# Patient Record
Sex: MALE | Race: OTHER | NOT HISPANIC OR LATINO | URBAN - METROPOLITAN AREA
[De-identification: names, ages, dates, MRNs, and addresses within clinical notes are randomized per-mention and may not be internally consistent; named-entity substitution may affect disease eponyms.]

---

## 2020-04-13 ENCOUNTER — INPATIENT (INPATIENT)
Facility: HOSPITAL | Age: 50
LOS: 8 days | Discharge: HOME | End: 2020-04-22
Attending: PSYCHIATRY & NEUROLOGY | Admitting: PSYCHIATRY & NEUROLOGY
Payer: COMMERCIAL

## 2020-04-13 ENCOUNTER — TRANSCRIPTION ENCOUNTER (OUTPATIENT)
Age: 50
End: 2020-04-13

## 2020-04-13 VITALS
HEART RATE: 88 BPM | RESPIRATION RATE: 18 BRPM | TEMPERATURE: 98 F | SYSTOLIC BLOOD PRESSURE: 235 MMHG | DIASTOLIC BLOOD PRESSURE: 128 MMHG | OXYGEN SATURATION: 96 %

## 2020-04-13 LAB
ALBUMIN SERPL ELPH-MCNC: 3.9 G/DL — SIGNIFICANT CHANGE UP (ref 3.5–5.2)
ALP SERPL-CCNC: 80 U/L — SIGNIFICANT CHANGE UP (ref 30–115)
ALT FLD-CCNC: 19 U/L — SIGNIFICANT CHANGE UP (ref 0–41)
ANION GAP SERPL CALC-SCNC: 14 MMOL/L — SIGNIFICANT CHANGE UP (ref 7–14)
APTT BLD: 31.4 SEC — SIGNIFICANT CHANGE UP (ref 27–39.2)
AST SERPL-CCNC: 20 U/L — SIGNIFICANT CHANGE UP (ref 0–41)
BASOPHILS # BLD AUTO: 0.07 K/UL — SIGNIFICANT CHANGE UP (ref 0–0.2)
BASOPHILS NFR BLD AUTO: 0.7 % — SIGNIFICANT CHANGE UP (ref 0–1)
BILIRUB SERPL-MCNC: 0.4 MG/DL — SIGNIFICANT CHANGE UP (ref 0.2–1.2)
BUN SERPL-MCNC: 45 MG/DL — HIGH (ref 10–20)
CALCIUM SERPL-MCNC: 9 MG/DL — SIGNIFICANT CHANGE UP (ref 8.5–10.1)
CHLORIDE SERPL-SCNC: 106 MMOL/L — SIGNIFICANT CHANGE UP (ref 98–110)
CO2 SERPL-SCNC: 20 MMOL/L — SIGNIFICANT CHANGE UP (ref 17–32)
CREAT SERPL-MCNC: 4.2 MG/DL — CRITICAL HIGH (ref 0.7–1.5)
EOSINOPHIL # BLD AUTO: 0.18 K/UL — SIGNIFICANT CHANGE UP (ref 0–0.7)
EOSINOPHIL NFR BLD AUTO: 1.8 % — SIGNIFICANT CHANGE UP (ref 0–8)
ESTIMATED AVERAGE GLUCOSE: 217 MG/DL — HIGH (ref 68–114)
GLUCOSE SERPL-MCNC: 165 MG/DL — HIGH (ref 70–99)
HBA1C BLD-MCNC: 9.2 % — HIGH (ref 4–5.6)
HCT VFR BLD CALC: 49.4 % — SIGNIFICANT CHANGE UP (ref 42–52)
HGB BLD-MCNC: 17.3 G/DL — SIGNIFICANT CHANGE UP (ref 14–18)
IMM GRANULOCYTES NFR BLD AUTO: 0.5 % — HIGH (ref 0.1–0.3)
INR BLD: 0.9 RATIO — SIGNIFICANT CHANGE UP (ref 0.65–1.3)
LYMPHOCYTES # BLD AUTO: 2 K/UL — SIGNIFICANT CHANGE UP (ref 1.2–3.4)
LYMPHOCYTES # BLD AUTO: 20.1 % — LOW (ref 20.5–51.1)
MCHC RBC-ENTMCNC: 29.1 PG — SIGNIFICANT CHANGE UP (ref 27–31)
MCHC RBC-ENTMCNC: 35 G/DL — SIGNIFICANT CHANGE UP (ref 32–37)
MCV RBC AUTO: 83 FL — SIGNIFICANT CHANGE UP (ref 80–94)
MONOCYTES # BLD AUTO: 0.6 K/UL — SIGNIFICANT CHANGE UP (ref 0.1–0.6)
MONOCYTES NFR BLD AUTO: 6 % — SIGNIFICANT CHANGE UP (ref 1.7–9.3)
NEUTROPHILS # BLD AUTO: 7.04 K/UL — HIGH (ref 1.4–6.5)
NEUTROPHILS NFR BLD AUTO: 70.9 % — SIGNIFICANT CHANGE UP (ref 42.2–75.2)
NRBC # BLD: 0 /100 WBCS — SIGNIFICANT CHANGE UP (ref 0–0)
PLATELET # BLD AUTO: 258 K/UL — SIGNIFICANT CHANGE UP (ref 130–400)
POTASSIUM SERPL-MCNC: 4.8 MMOL/L — SIGNIFICANT CHANGE UP (ref 3.5–5)
POTASSIUM SERPL-SCNC: 4.8 MMOL/L — SIGNIFICANT CHANGE UP (ref 3.5–5)
PROT SERPL-MCNC: 6.7 G/DL — SIGNIFICANT CHANGE UP (ref 6–8)
PROTHROM AB SERPL-ACNC: 10.4 SEC — SIGNIFICANT CHANGE UP (ref 9.95–12.87)
RBC # BLD: 5.95 M/UL — SIGNIFICANT CHANGE UP (ref 4.7–6.1)
RBC # FLD: 12.7 % — SIGNIFICANT CHANGE UP (ref 11.5–14.5)
SODIUM SERPL-SCNC: 140 MMOL/L — SIGNIFICANT CHANGE UP (ref 135–146)
TROPONIN T SERPL-MCNC: 0.06 NG/ML — CRITICAL HIGH
TROPONIN T SERPL-MCNC: 0.07 NG/ML — CRITICAL HIGH
WBC # BLD: 9.94 K/UL — SIGNIFICANT CHANGE UP (ref 4.8–10.8)
WBC # FLD AUTO: 9.94 K/UL — SIGNIFICANT CHANGE UP (ref 4.8–10.8)

## 2020-04-13 PROCEDURE — 93010 ELECTROCARDIOGRAM REPORT: CPT | Mod: 77

## 2020-04-13 PROCEDURE — 70450 CT HEAD/BRAIN W/O DYE: CPT | Mod: 26,77

## 2020-04-13 PROCEDURE — 93010 ELECTROCARDIOGRAM REPORT: CPT

## 2020-04-13 PROCEDURE — 99291 CRITICAL CARE FIRST HOUR: CPT

## 2020-04-13 PROCEDURE — 0042T: CPT

## 2020-04-13 PROCEDURE — 99284 EMERGENCY DEPT VISIT MOD MDM: CPT

## 2020-04-13 PROCEDURE — 71045 X-RAY EXAM CHEST 1 VIEW: CPT | Mod: 26

## 2020-04-13 RX ORDER — LABETALOL HCL 100 MG
30 TABLET ORAL ONCE
Refills: 0 | Status: COMPLETED | OUTPATIENT
Start: 2020-04-13 | End: 2020-04-13

## 2020-04-13 RX ORDER — ASPIRIN/CALCIUM CARB/MAGNESIUM 324 MG
81 TABLET ORAL DAILY
Refills: 0 | Status: DISCONTINUED | OUTPATIENT
Start: 2020-04-13 | End: 2020-04-13

## 2020-04-13 RX ORDER — HEPARIN SODIUM 5000 [USP'U]/ML
5000 INJECTION INTRAVENOUS; SUBCUTANEOUS EVERY 12 HOURS
Refills: 0 | Status: DISCONTINUED | OUTPATIENT
Start: 2020-04-13 | End: 2020-04-14

## 2020-04-13 RX ORDER — CHLORHEXIDINE GLUCONATE 213 G/1000ML
1 SOLUTION TOPICAL
Refills: 0 | Status: DISCONTINUED | OUTPATIENT
Start: 2020-04-13 | End: 2020-04-22

## 2020-04-13 RX ORDER — ATORVASTATIN CALCIUM 80 MG/1
80 TABLET, FILM COATED ORAL AT BEDTIME
Refills: 0 | Status: DISCONTINUED | OUTPATIENT
Start: 2020-04-13 | End: 2020-04-22

## 2020-04-13 RX ORDER — HYDRALAZINE HCL 50 MG
10 TABLET ORAL ONCE
Refills: 0 | Status: COMPLETED | OUTPATIENT
Start: 2020-04-13 | End: 2020-04-13

## 2020-04-13 RX ORDER — ALTEPLASE 100 MG
81 KIT INTRAVENOUS ONCE
Refills: 0 | Status: DISCONTINUED | OUTPATIENT
Start: 2020-04-13 | End: 2020-04-13

## 2020-04-13 RX ORDER — ALTEPLASE 100 MG
9 KIT INTRAVENOUS ONCE
Refills: 0 | Status: DISCONTINUED | OUTPATIENT
Start: 2020-04-13 | End: 2020-04-13

## 2020-04-13 RX ORDER — NICARDIPINE HYDROCHLORIDE 30 MG/1
5 CAPSULE, EXTENDED RELEASE ORAL
Qty: 40 | Refills: 0 | Status: DISCONTINUED | OUTPATIENT
Start: 2020-04-13 | End: 2020-04-14

## 2020-04-13 RX ORDER — ASPIRIN/CALCIUM CARB/MAGNESIUM 324 MG
325 TABLET ORAL DAILY
Refills: 0 | Status: DISCONTINUED | OUTPATIENT
Start: 2020-04-13 | End: 2020-04-14

## 2020-04-13 RX ADMIN — HEPARIN SODIUM 5000 UNIT(S): 5000 INJECTION INTRAVENOUS; SUBCUTANEOUS at 19:27

## 2020-04-13 RX ADMIN — Medication 30 MILLIGRAM(S): at 11:51

## 2020-04-13 RX ADMIN — Medication 81 MILLIGRAM(S): at 14:55

## 2020-04-13 RX ADMIN — Medication 10 MILLIGRAM(S): at 16:33

## 2020-04-13 RX ADMIN — NICARDIPINE HYDROCHLORIDE 25 MG/HR: 30 CAPSULE, EXTENDED RELEASE ORAL at 10:57

## 2020-04-13 NOTE — PROGRESS NOTE ADULT - SUBJECTIVE AND OBJECTIVE BOX
Stroke Progress Note:    TARUN DEL TORO    1. Chief Complaint: Left sided weakness.     HPI:  Patient is a 50 yo man with hx of dm, aflutter on asa who presents with the following. On Saturday night at 9pm, patient was at home and wife noted him drooling with slurred speech. He did not seek medical attention at the time and went to sleep.  On Sunday, patient states that he had some trouble breathing and felt like he had the flu.  he was afebrile.  he stayed in bed and had some stomach discomfort.  He woke up this am with worsening of his slurred speech and left sided facial droop and mild weakness on the left.  NIHSS 4. Stroke code called but was outside the window for IV TPA.       2. Relevant PMH:   Prior ischemic stroke/TIA[ x], Afib [x ], CAD [ ], HTN [ ], DLD [ ], DM [x ], PVD [ ], Obesity [ ],   Sedentary lifestyle [ ], CHF [ ], RAE [ ], Cancer Hx [ ].    3. Social History: Smoking [ ], Drug Use [ ], Alcohol Use [ ], Other [ ]    4. Possible Location of Stroke:  Unknown at this time will have a better understanding post stroke workup.   5. Relevant Brain Tissue Imaging:  < from: CT Brain Stroke Protocol (04.13.20 @ 10:35) >  IMPRESSION:    1.  No acute hemorrhage    2.  No acute territorial infarct    3.  Bilateral relatively symmetric diminished density within the white matter tracts of the corona radiata extending to external capsules. The appearance suggests ischemic change, but does not appear acute    6. Relevant Cerebrovascular Imaging:    CT Perfusion w/ Maps w/ IV Cont:   EXAM:  CT PERFUSION W MAPS IC            IMPRESSION:    1.  Patent carotid bifurcations.    2.  No large vessel occlusion    3.  No evidence for ischemia using rapid perfusion maps and the Tmax > 6 sec and CBF < 30% mismatch volume criteria         7. Relevant blood tests:    pending  8. Relevant cardiac rhythm monitoring:  known aflutter  9. Relevant Cardiac Structure: (TTE/INDY +/-):[ ]No intracardiac thrombus/[ ] no vegetation/[ ]no [place]ia/EF:  pending  Home Medications:      MEDICATIONS  (STANDING):  aspirin  chewable 81 milliGRAM(s) Oral daily  hydrALAZINE Injectable 10 milliGRAM(s) IV Push once  niCARdipine Infusion 5 mG/Hr (25 mL/Hr) IV Continuous <Continuous>      10. PT/OT/Speech/Rehab/S&Sw/ Cognitive eval results and recommendations: pending    11. Exam:    Vital Signs Last 24 Hrs  T(C): 36.1 (13 Apr 2020 14:30), Max: 36.7 (13 Apr 2020 10:08)  T(F): 96.9 (13 Apr 2020 14:30), Max: 98.1 (13 Apr 2020 10:08)  HR: 86 (13 Apr 2020 14:30) (81 - 100)  BP: 196/94 (13 Apr 2020 14:30) (139/64 - 235/128)  BP(mean): --  RR: 18 (13 Apr 2020 14:30) (18 - 18)  SpO2: 97% (13 Apr 2020 14:30) (96% - 100%)    12.   NIH STROKE SCALE  Item	                                                        Score  1 a.	Level of Consciousness	               	0  1 b. LOC Questions	                                0  1 c.	LOC Commands	                               	0  2.	Best Gaze	                                        0  3.	Visual	                                                0  4.	Facial Palsy	                                       1  5 a.	Motor Arm - Left	                               1  5 b.	Motor Arm - Right	                        0  6 a.	Motor Leg - Left	                            1  6 b.	Motor Leg - Right	                                0  7.	Limb Ataxia	                                        0  8.	Sensory	                                                0  9.	Language	                                        0  10.	Dysarthria	                                       1  11.	Extinction and Inattention  	        0  ______________________________________  TOTAL	                                                        0    Total NIHSS on admission:    4      mRS:2  0 No symptoms at all  1 No significant disability despite symptoms; able to carry out all usual duties and activities without assistance  2 Slight disability; unable to carry out all previous activities, but able to look after own affairs  3 Moderate disability; requiring some help, but able to walk without assistance  4 Moderately severe disability; unable to walk without assistance and unable to attend to own bodily needs without assistance  5 Severe disability; bedridden, incontinent and requiring constant nursing care and attention  6 Dead

## 2020-04-13 NOTE — H&P ADULT - HISTORY OF PRESENT ILLNESS
Patient is a 50 yo man with hx of dm, aflutter on asa who presents with the following.  On Saturday night at 9pm, patient was at home and wife noted him drooling with slurred speech. he did not seek medical attention at the time and went to sleep.  on Sunday, patient states that he had some trouble breathing and felt like he had the flu.  he was afebrile.  he stayed in bed and had some stomach discomfort.  he woke up this am with worsening of his slurred speech and left sided facial droop and mild weakness on the left.  NIHSS 4 AND MRS 0 50 y/o man with pmhx of DMII not on medications and RAE not on CPAP presents from home for slurred speech and left sided facial droop.  He states that on Saturday night at 9pm, he was home with his wife who noted him drooling with slurred speech. He did not seek medical attention at the time and went to sleep.  On Sunday, patient states that he had SOB and felt tired with body aches "as if I had the flu". He was afebrile but had chills. He also had some abdominal discomfort without N/V. He denies chest pain, cough, HA or dysuria. He has a hx of hematuria (reason why he d/rodger DMII medications) and proteinuria.  In ED, NIHSS 4 AND MRS 0, CT head negative for intracranial bleed, no evidence of stroke. BP was elevated ( 235/128) and he was started on nicardipine drip but subsequently d/rodger per neurology request. Rest of VS wnl, patient on room air saturating 98% with RR 18/min  Labs consistent with elevated creatinine at 4 ( no baseline).

## 2020-04-13 NOTE — CONSULT NOTE ADULT - SUBJECTIVE AND OBJECTIVE BOX
NEPHROLOGY CONSULTATION NOTE    50 y/o man with pmhx of DMII not on medications and RAE not on CPAP presents from home for slurred speech and left sided facial droop.  He states that on Saturday night at 9pm, he was home with his wife who noted him drooling with slurred speech. He did not seek medical attention at the time and went to sleep.  On Sunday, patient states that he had SOB and felt tired with body aches "as if I had the flu". He was afebrile but had chills. He also had some abdominal discomfort without N/V. He denies chest pain, cough, HA or dysuria. He has a hx of hematuria (reason why he d/rodger DMII medications) and proteinuria.  In ED, NIHSS 4 AND MRS 0, CT head negative for intracranial bleed, no evidence of stroke. BP was elevated ( 235/128) and he was started on nicardipine drip but subsequently d/rodger per neurology request. Rest of VS wnl, patient on room air saturating 98% with RR 18/min  Labs consistent with elevated creatinine at 4 ( no baseline). (4/13/2020)  weakness improved.    PAST MEDICAL & SURGICAL HISTORY:  Proteinuria  RAE (obstructive sleep apnea)  DM (diabetes mellitus)    Allergies:  Allergy Status Unknown    Home Medications:    Hospital Medications:   MEDICATIONS  (STANDING):  aspirin  chewable 81 milliGRAM(s) Oral daily  aspirin enteric coated 325 milliGRAM(s) Oral daily  atorvastatin 80 milliGRAM(s) Oral at bedtime  chlorhexidine 4% Liquid 1 Application(s) Topical <User Schedule>  heparin  Injectable 5000 Unit(s) SubCutaneous every 12 hours  niCARdipine Infusion 5 mG/Hr (25 mL/Hr) IV Continuous <Continuous>      SOCIAL HISTORY:  Denies ETOH,Smoking,   FAMILY HISTORY:  FHx: cancer of prostate        REVIEW OF SYSTEMS:    All other review of systems is negative unless indicated above.    VITALS:  T(F): 96.8 (04-13-20 @ 16:30), Max: 98.1 (04-13-20 @ 10:08)  HR: 81 (04-13-20 @ 16:30)  BP: 179/86 (04-13-20 @ 16:30)  RR: 16 (04-13-20 @ 16:30)  SpO2: 98% (04-13-20 @ 16:30)      Weight (kg): 114 (04-13 @ 10:16)    I&O's Detail        PHYSICAL EXAM:  Constitutional: NAD  Respiratory: CTAB, no wheezes, rales or rhonchi  Cardiovascular: S1, S2, RRR  Gastrointestinal: BS+, soft, NT/ND  Extremities: No peripheral edema  Neurological: A/O x 3  : No hinds.     Vascular Access:    LABS:  04-13    140  |  106  |  45<H>  ----------------------------<  165<H>  4.8   |  20  |  4.2<HH>    Ca    9.0      13 Apr 2020 10:20    TPro  6.7  /  Alb  3.9  /  TBili  0.4  /  DBili      /  AST  20  /  ALT  19  /  AlkPhos  80  04-13    Creatinine Trend: 4.2 <--                        17.3   9.94  )-----------( 258      ( 13 Apr 2020 10:20 )             49.4     Troponin T, Serum: 0.07 ng/mL (04.13.20 @ 10:20)      Urine Studies:    RADIOLOGY & ADDITIONAL STUDIES:  < from: CT Head No Cont (04.13.20 @ 13:14) >  IMPRESSION:     In comparison with the prior noncontrast CT scan of the brain dated April 13, 2020 time 10:27 AM:    Stable examination.    MRI of the brain is suggested for further evaluation not contraindicated in this patient.    < end of copied text >    < from: Xray Chest 1 View AP/PA (04.13.20 @ 11:23) >  Impression:      No radiographic evidence of acute cardiopulmonary disease.    < end of copied text >    < from: CT Perfusion w/ Maps w/ IV Cont (04.13.20 @ 11:00) >  IMPRESSION:    1.  Patent carotid bifurcations.    2.  No large vessel occlusion    3.  No evidence for ischemia using rapid perfusion maps and the Tmax > 6 sec and CBF < 30% mismatch volume criteria.    < end of copied text >    < from: CT Brain Stroke Protocol (04.13.20 @ 10:35) >  IMPRESSION:    1.  No acute hemorrhage    2.  No acute territorial infarct    3.  Bilateral relatively symmetric diminished density within the white matter tracts of the corona radiata extending to external capsules. The appearance suggests ischemic change, but does not appear acute    < end of copied text >

## 2020-04-13 NOTE — ED PROVIDER NOTE - OBJECTIVE STATEMENT
49y M w/ PMH of atrial flutter and DM presents with dysphagia that started earlier today. Last seen normal 845am. Has also been having L sided weakness with L. facial droop since 2 days ago. No alleviating/worsening symptoms. Denies fever, chills, cough, nasal congestion, sore throat, CP, SOB, abd pain, n/v/d, or numbness/tingling.

## 2020-04-13 NOTE — CONSULT NOTE ADULT - ASSESSMENT
Assessment:    Stroke of unkown duration  elevated creatinine(New JORGE vs CKD)    PLAN:    CNS: avoid sedation, f/u neurology, not candidate for TPa or neurointerventional as / neuro    HEENT:  Oral care    PULMONARY:  HOB @ 45 degrees, keep sats 94%-97%             CARDIOVASCULAR: Check EKG, 2d echo, repeat trops. keep SBP >14, <220. stop nicardene drip.     GI: GI prophylaxis Protonix                                         Feeding: npo for now, speech and swallow    RENAL:  unkown baseline, check urine lytes, alb/cr, follow up nephro.    INFECTIOUS DISEASE:no abx for now    HEMATOLOGICAL:  DVT prophylaxis.    ENDOCRINE:  Follow up FS.  Insulin protocol if needed.    MUSCULOSKELETAL: Bed rest for now    CODE STATUS: FULL CODE    DISPOSITION: Patient require continued monitoring in MICU for q1 neurochecks. Assessment:    l side weakness/ facial droop/ Stroke no tpa  Flu like symptoms ro COVID  Renal failure    PLAN:    CNS:  f/u neurology, ASA, MRI head    HEENT:  Oral care    PULMONARY:  HOB @ 45 degrees, keep sats 94%-97%             CARDIOVASCULAR: Check EKG, 2d echo, repeat trops. keep SBP >14, <220.    GI: GI prophylaxis Protonix                                         Feeding: npo for now, speech and swallow    RENAL:  check urine lytes, alb/cr, follow up nephro. renal us    INFECTIOUS DISEASE: nasopharyngeal swab for COVID    HEMATOLOGICAL:  DVT prophylaxis.    ENDOCRINE:  Follow up FS.  Insulin protocol if needed.    MUSCULOSKELETAL: Bed rest for now    CODE STATUS: FULL CODE    DISPOSITION: Patient require continued monitoring in MICU for q1 neurochecks.

## 2020-04-13 NOTE — ED PROVIDER NOTE - NS ED ROS FT
Eyes:  No visual changes, eye pain or discharge.  ENMT:  No hearing changes, pain, no sore throat or runny nose, no difficulty swallowing  Cardiac:  No chest pain, SOB or edema. No chest pain with exertion.  Respiratory:  No cough or respiratory distress. No hemoptysis. No history of asthma or RAD.  GI:  No nausea, vomiting, diarrhea or abdominal pain.  :  No dysuria, frequency or burning.  MS:  No myalgia, muscle weakness, joint pain or back pain.  Neuro:  No headache. No LOC. + weakness  Skin:  No skin rash.   Endocrine: + history of diabetes.

## 2020-04-13 NOTE — CONSULT NOTE ADULT - SUBJECTIVE AND OBJECTIVE BOX
Neurology Consultation note    Name  TARUN DEL TORO    HPI:  PATIENT WAS SEEN VIA TELEHEALTH  Patient is a 48 yo man with hx of dm, aflutter on asa who presents with the following.  On Saturday night at 9pm, patient was at home and wife noted him drooling with slurred speech. he did not seek medical attention at the time and went to sleep.  on Sunday, patient states that he had some trouble breathing and felt like he had the flu.  he was afebrile.  he stayed in bed and had some stomach discomfort.  he woke up this am with worsening of his slurred speech and left sided facial droop and mild weakness on the left.  NIHSS 4 AND MRS 0    STROKE CODE CALLED AT 1015 AM  /120 ON ADM AND PT ON CARDENE NOW  \    Vital Signs Last 24 Hrs  T(C): 36.7 (13 Apr 2020 10:08), Max: 36.7 (13 Apr 2020 10:08)  T(F): 98.1 (13 Apr 2020 10:08), Max: 98.1 (13 Apr 2020 10:08)  HR: 92 (13 Apr 2020 11:15) (85 - 100)  BP: 143/73 (13 Apr 2020 11:15) (143/73 - 235/128)  BP(mean): --  RR: 18 (13 Apr 2020 11:15) (18 - 18)  SpO2: 97% (13 Apr 2020 11:15) (96% - 100%)    Neurological Exam:   Mental status: Awake, alert and oriented x3.  Recent and remote memory intact.  Naming, repetition and comprehension intact.  Attention/concentration intact. , no aphasia.  Fund of knowledge appropriate.    Cranial nerves: Pupils equally round and reactive to light, visual fields full, no nystagmus, extraocular muscles intact, V1 through V3 intact bilaterally and symmetric, L CENTRAL FACIAL DROOP, hearing intact to finger rub, palate elevation symmetric, tongue was midline. MILD DYSARTHRIA  Motor:  LUE AND LLE DRIFT, DOES NOT HIT BED    Sensation: Intact to light touch, proprioception, and pinprick.   Coordination: No dysmetria on finger-to-nose and heel-to-shin.  No dysdiadokinesia.      Medications  aspirin  chewable 81 milliGRAM(s) Oral daily  aspirin  chewable 81 milliGRAM(s) Oral daily  labetalol Injectable 30 milliGRAM(s) IV Push once  niCARdipine Infusion 5 mG/Hr IV Continuous <Continuous>      Lab  04-13    140  |  106  |  45<H>  ----------------------------<  165<H>  4.8   |  20  |  4.2<HH>    Ca    9.0      13 Apr 2020 10:20    TPro  6.7  /  Alb  3.9  /  TBili  0.4  /  DBili  x   /  AST  20  /  ALT  19  /  AlkPhos  80  04-13                          17.3   9.94  )-----------( 258      ( 13 Apr 2020 10:20 )             49.4     LIVER FUNCTIONS - ( 13 Apr 2020 10:20 )  Alb: 3.9 g/dL / Pro: 6.7 g/dL / ALK PHOS: 80 U/L / ALT: 19 U/L / AST: 20 U/L / GGT: x                   Radiology  CTH NO ACUTE CHANGE  CTA H/N: NO LVO  CTP: NO EVIDENCE OF ISCHEMIA      Assessment:  50 YO MAN WITH HX OF DM AND A FLUTTER ADM WITH L HP AND DYSARTHRIA. PATIENT WITH GENERALIZED WEAKNESS AND MALAISE PRECEDING THIS YESTERDAY.  LKW POSSIBLY SAT BUT DEFINITELY WOKE UP WORSE THIS AM. NIHSS 4. NOT TPA CANDIDATE AS HE IS OUT OF THE WINDOW. NOT NI CANDIDATE      Plan:  ADMIT TO ICU FOR NEURO CHECKS Q 4 AND FOR BP MANAGEMENT  PERMISSIVE HTN -180  GIVEN  MG . CONT 162 mg qd (note patient took 3 asa 81 mg last night)  START LIPITOR 80 MG QD  2D ECHO  CAN REPEAT CTH IN AM  MRI BRAIN IF COVID NEGATIVE  FURTHER RECS AS PER NEUROVASCULAR TEAM Neurology Consultation note    Name  TARUN DEL TORO    HPI:  PATIENT WAS SEEN VIA TELEHEALTH  Patient is a 48 yo man with hx of dm, aflutter on asa who presents with the following.  On Saturday night at 9pm, patient was at home and wife noted him drooling with slurred speech. he did not seek medical attention at the time and went to sleep.  on Sunday, patient states that he had some trouble breathing and felt like he had the flu.  he was afebrile.  he stayed in bed and had some stomach discomfort.  he woke up this am with worsening of his slurred speech and left sided facial droop and mild weakness on the left.  NIHSS 4 AND MRS 0    STROKE CODE CALLED AT 1015 AM  /120 ON ADM AND PT ON CARDENE NOW  \    Vital Signs Last 24 Hrs  T(C): 36.7 (13 Apr 2020 10:08), Max: 36.7 (13 Apr 2020 10:08)  T(F): 98.1 (13 Apr 2020 10:08), Max: 98.1 (13 Apr 2020 10:08)  HR: 92 (13 Apr 2020 11:15) (85 - 100)  BP: 143/73 (13 Apr 2020 11:15) (143/73 - 235/128)  BP(mean): --  RR: 18 (13 Apr 2020 11:15) (18 - 18)  SpO2: 97% (13 Apr 2020 11:15) (96% - 100%)    Neurological Exam:   Mental status: Awake, alert and oriented x3.  Recent and remote memory intact.  Naming, repetition and comprehension intact.  Attention/concentration intact. , no aphasia.  Fund of knowledge appropriate.    Cranial nerves: Pupils equally round and reactive to light, visual fields full, no nystagmus, extraocular muscles intact, V1 through V3 intact bilaterally and symmetric, L CENTRAL FACIAL DROOP, hearing intact to finger rub, palate elevation symmetric, tongue was midline. MILD DYSARTHRIA  Motor:  LUE AND LLE DRIFT, DOES NOT HIT BED    Sensation: Intact to light touch, proprioception, and pinprick.   Coordination: No dysmetria on finger-to-nose and heel-to-shin.  No dysdiadokinesia.      Medications  aspirin  chewable 81 milliGRAM(s) Oral daily  aspirin  chewable 81 milliGRAM(s) Oral daily  labetalol Injectable 30 milliGRAM(s) IV Push once  niCARdipine Infusion 5 mG/Hr IV Continuous <Continuous>      Lab  04-13    140  |  106  |  45<H>  ----------------------------<  165<H>  4.8   |  20  |  4.2<HH>    Ca    9.0      13 Apr 2020 10:20    TPro  6.7  /  Alb  3.9  /  TBili  0.4  /  DBili  x   /  AST  20  /  ALT  19  /  AlkPhos  80  04-13                          17.3   9.94  )-----------( 258      ( 13 Apr 2020 10:20 )             49.4     LIVER FUNCTIONS - ( 13 Apr 2020 10:20 )  Alb: 3.9 g/dL / Pro: 6.7 g/dL / ALK PHOS: 80 U/L / ALT: 19 U/L / AST: 20 U/L / GGT: x                   Radiology  CTH NO ACUTE CHANGE  CTA H/N: NO LVO  CTP: NO EVIDENCE OF ISCHEMIA      Assessment:  50 YO MAN WITH HX OF DM AND A FLUTTER ADM WITH L HP AND DYSARTHRIA. PATIENT WITH GENERALIZED WEAKNESS AND MALAISE PRECEDING THIS YESTERDAY.  LKW POSSIBLY SAT BUT DEFINITELY WOKE UP WORSE THIS AM. NIHSS 4. NOT TPA CANDIDATE AS HE IS OUT OF THE WINDOW. NOT NI CANDIDATE      Plan:  ADMIT TO ICU FOR NEURO CHECKS Q 4 AND FOR BP MANAGEMENT  PERMISSIVE HTN TO TARGET 180s systolic.  maintain sbp >140  GIVEN  MG . CONT 162 mg qd (note patient took 3 asa 81 mg last night)  START LIPITOR 80 MG QD  2D ECHO  CAN REPEAT CTH IN AM  MRI BRAIN IF COVID NEGATIVE  FURTHER RECS AS PER NEUROVASCULAR TEAM

## 2020-04-13 NOTE — PROGRESS NOTE ADULT - ASSESSMENT
13. Impression:  Patient is a 50 yo man with hx of dm, aflutter on asa who presents with the following. On Saturday night at 9pm, patient was at home and wife noted him drooling with slurred speech. He did not seek medical attention at the time and went to sleep.  On Sunday, patient states that he had some trouble breathing and felt like he had the flu.  he was afebrile.  he stayed in bed and had some stomach discomfort.  He woke up this am with worsening of his slurred speech and left sided facial droop and mild weakness on the left.  NIHSS 4. Stroke code called but was outside the window for IV TPA. Patient started on Cardene for SBP >240. On the lowest dose of Cardene patients BP dropped to 170 systolic and his left sided weakness worsened, Stroke code called. CT head stable. Cardene stopped.     14. Probable cause/s of Stroke:  Etiology of stroke likely related to small vessel disease, but embolic source should be ruled out.     15. Suggestions:   Routine stroke workup including:  COVID19 TESTING.  Only restart Cardene if BP >220/110 and sustained.   MRI brain without carmelina.   TTE.  LDL, A1C  Telemetry monitoring.   PT OT Rehab   mg daily.   Lipitor 80 mg QHS  PT OT Rehab    16. Disposition: Should be tested for COVID 19, should have q4 neuro checks and telemetry monitoring.     Gregor Calvert NP  [phone]

## 2020-04-13 NOTE — CONSULT NOTE ADULT - ASSESSMENT
50 y/o M with JORGE in hospital for slurred speech, left side facial droop, SOB, chills but afebrile, body aches.  PMH DM, RAE, Proteinuria    # JORGE: no baseline creatinine available. last OP labs ~ 5 years ago. poor compliance with meds.   - ? etiology   - pt seems dry on exam.   - start LR at 75 cc/hr   - strict I/O   - check UA, urine creatinine, Na, Pr/Cr ratio   - trend creatinine   - BP noted, on nicardipine drip. monitor BP   - Hb noted.   - potassium WNL   - Ca at goal. check Phos, iPTH   - h/o DM for > 10 years. not on any medications. check HbA1C.   - slurred speech, facial drooping, weakness improved. unremarkable imaging noted. Neuro following. s/p contrast study.   - SOB. unremarkable CXR. pt unlikely to have COVID.   - avoid nephrotoxins and hyptension   - no need for RRT   - may need renal biopsy in near future.   - will follow

## 2020-04-13 NOTE — H&P ADULT - NSHPPHYSICALEXAM_GEN_ALL_CORE
GEN: No acute distress, NC/AT, anicteric  LUNGS: Clear to auscultation bilaterally, no wheezes  HEART: S1/S2 present. RRR.   ABD: Soft, non-tender, non-distended. Bowel sounds present  EXT: NC/NC/NE/2+PP/SALEH/Skin Intact.   NEURO: AAOX3    Intravenous access: Peripheral access  NG tube: None  Alvarado Catheter: None GEN: No acute distress, NC/AT, anicteric  LUNGS: Clear to auscultation bilaterally, no wheezes  HEART: S1/S2 present. RRR.   ABD: Soft, non-tender, non-distended. Bowel sounds present  EXT: NC/NC/NE/2+PP/SALEH/Skin Intact.   NEURO: AAOX3, moves all extremities, left facial droop, CN II-XII intact, no sensory deficits    Intravenous access: Peripheral access  NG tube: None  Alvarado Catheter: None

## 2020-04-13 NOTE — ED ADULT NURSE NOTE - OBJECTIVE STATEMENT
Pt c/o worsening left sided weakness and left sided facial droop since Saturday, pt also endorses worsening dysphagia that started approx 0845. No alleviating/worsening symptoms. Denies fever, chills, cough, nasal congestion, sore throat, CP, SOB, abd pain, n/v/d, or numbness/tingling.

## 2020-04-13 NOTE — ED PROVIDER NOTE - CARE PLAN
Principal Discharge DX:	Dysphagia  Secondary Diagnosis:	Weakness Principal Discharge DX:	Dysphagia  Assessment and plan of treatment:	r/oCVA  Secondary Diagnosis:	Weakness

## 2020-04-13 NOTE — H&P ADULT - ASSESSMENT
Assessment:    Stroke of unkown duration  elevated creatinine(New JORGE vs CKD)    PLAN:    CNS: avoid sedation, f/u neurology, not candidate for TPa or neurointerventional as / neuro    HEENT:  Oral care    PULMONARY:  HOB @ 45 degrees, keep sats 94%-97%             CARDIOVASCULAR: Check EKG, 2d echo, repeat trops. keep SBP >14, <220. stop nicardene drip.     GI: GI prophylaxis Protonix                                         Feeding: npo for now, speech and swallow    RENAL:  unknown baseline, check urine lytes, alb/cr, follow up nephro.    INFECTIOUS DISEASE: no abx for now    HEMATOLOGICAL:  DVT prophylaxis.    ENDOCRINE:  Follow up FS.  Insulin protocol if needed.    MUSCULOSKELETAL: Bed rest for now    CODE STATUS: FULL CODE    DISPOSITION: Patient require continued monitoring in MICU for q1 neurochecks. 50 y/o man with pmhx of DMII not on medications and RAE not on CPAP presents from home for slurred speech and left sided facial droop.    ASSESSMENT:    Stroke of unknown duration  elevated creatinine(New JORGE vs CKD)  RAE not on CPAP    PLAN:    CNS: avoid sedation, not candidate for tpA, can repeat CTH in am, MRI brain non contrast if COVID negative    HEENT:  Oral care    PULMONARY:  HOB @ 45 degrees, keep sats 94%-97%. Patient on room air saturatin 98%             CARDIOVASCULAR: EKG consistent with NSR with flattening of ST waves in the inferior leads, f/up 2d echo, trend repeat trops. keep SBP >14, <220. OFF cardene drip. Restart cardene drip if SBP >220 persistently    GI: GI prophylaxis Protonix 40 mg IV qd for now since patient NPO ( switch to PO when passes S&S)       ;    Feeding: npo for now,speech and swallow    RENAL:  unknown baseline, likely CKD ( patient has hx of proteinuria and hematuria), f/up urine lytes and UA, alb/cr, follow up nephro recs    INFECTIOUS DISEASE: no abx for now    HEMATOLOGICAL:  DVT prophylaxis with heparin subq q12    ENDOCRINE:  Follow up FS qac/hs.  Insulin protocol if needed.    MUSCULOSKELETAL: OOBTC as tolerated    CODE STATUS: FULL CODE

## 2020-04-13 NOTE — ED ADULT NURSE NOTE - CHIEF COMPLAINT QUOTE
Pt BIBA for worsening R face droop since Saturday.  Pt reporting worsening weakness and dizziness. Pt states he having diffulty speaking since this morning.

## 2020-04-13 NOTE — ED ADULT NURSE NOTE - NSIMPLEMENTINTERV_GEN_ALL_ED
Implemented All Universal Safety Interventions:  [place]owler to call system. Call bell, personal items and telephone within reach. Instruct patient to call for assistance. Room bathroom lighting operational. Non-slip footwear when patient is off stretcher. Physically safe environment: no spills, clutter or unnecessary equipment. Stretcher in lowest position, wheels locked, appropriate side rails in place.

## 2020-04-13 NOTE — ED PROVIDER NOTE - PHYSICAL EXAMINATION
CONSTITUTIONAL: Well-developed; well-nourished; in no acute distress.   SKIN: warm, dry  HEAD: Normocephalic; atraumatic.  EYES: PERRL, EOMI, no conjunctival erythema  ENT: No nasal discharge; airway clear.  NECK: Supple; non tender.  CARD: S1, S2 normal; no murmurs, gallops, or rubs. Regular rate and rhythm.   RESP: No wheezes, rales or rhonchi.  ABD: soft ntnd  EXT: Normal ROM.  No clubbing, cyanosis or edema. 4/5 strength in LUE + LLE.  LYMPH: No acute cervical adenopathy.  NEURO: Alert, oriented. L. facial droop. dysphagia. NIHSS = 4.   PSYCH: Cooperative, appropriate.

## 2020-04-13 NOTE — CONSULT NOTE ADULT - SUBJECTIVE AND OBJECTIVE BOX
Patient is a 49y old  Male who presents with a chief complaint of     HPI: 50 yo man with hx of dm, aflutter on asa who presents with the following.  On Saturday night at 9pm, patient was at home and wife noted him drooling with slurred speech. he did not seek medical attention at that time and went to sleep.  On Sunday, patient states that he had some trouble breathing and felt like he had the flu.  he was afebrile.  he stayed in bed and had some stomach discomfort.  he woke up this am with worsening of his slurred speech and left sided facial droop and mild weakness on the left.  NIHSS 4 AND MRS 0        PAST MEDICAL & SURGICAL HISTORY:  Aflutter not on AC  DM    SOCIAL HX:   Smoking     -ve                    ETOH       -ve                     Other  -ve    FAMILY HISTORY:  :  No known cardiovacular family hisotry     ROS:  See HPI     Allergies    Allergy Status Unknown    Intolerances          PHYSICAL EXAM    ICU Vital Signs Last 24 Hrs  T(C): 36.7 (13 Apr 2020 10:08), Max: 36.7 (13 Apr 2020 10:08)  T(F): 98.1 (13 Apr 2020 10:08), Max: 98.1 (13 Apr 2020 10:08)  HR: 81 (13 Apr 2020 12:42) (81 - 100)  BP: 139/64 (13 Apr 2020 12:42) (139/64 - 235/128)  RR: 18 (13 Apr 2020 12:42) (18 - 18)  SpO2: 97% on ra (13 Apr 2020 12:42) (96% - 100%)      General: In NAD   HEENT:  YUSUF              Lymphatic system: No cervical LN   Lungs: Bilateral BS  Cardiovascular: Regular  Gastrointestinal: Soft, Positive BS  Musculoskeletal: no LE edema  Skin: Warm.  Intact  Neurological: mild dysarthia, AAOx3, Left sided weakness, 3/5, L facial droop.        LABS:                          17.3   9.94  )-----------( 258      ( 13 Apr 2020 10:20 )             49.4                                               04-13    140  |  106  |  45<H>  ----------------------------<  165<H>  4.8   |  20  |  4.2<HH>    Ca    9.0      13 Apr 2020 10:20    TPro  6.7  /  Alb  3.9  /  TBili  0.4  /  DBili  x   /  AST  20  /  ALT  19  /  AlkPhos  80  04-13      PT/INR - ( 13 Apr 2020 10:20 )   PT: 10.40 sec;   INR: 0.90 ratio         PTT - ( 13 Apr 2020 10:20 )  PTT:31.4 sec                                           CARDIAC MARKERS ( 13 Apr 2020 10:20 )  x     / 0.07 ng/mL / x     / x     / x                                                LIVER FUNCTIONS - ( 13 Apr 2020 10:20 )  Alb: 3.9 g/dL / Pro: 6.7 g/dL / ALK PHOS: 80 U/L / ALT: 19 U/L / AST: 20 U/L / GGT: x                                                                                                                                       X-Rays                                                                                     ECHO    MEDICATIONS  (STANDING):  aspirin  chewable 81 milliGRAM(s) Oral daily  niCARdipine Infusion 5 mG/Hr (25 mL/Hr) IV Continuous <Continuous>    MEDICATIONS  (PRN): Patient is a 49y old  Male who presents with a chief complaint of slurred speech    HPI: 48 yo man with hx of dm, aflutter on asa who presents with slurred speech. On Saturday night at 9pm, patient was at home and wife noted him drooling with slurred speech. he did not seek medical attention at that time and went to sleep.  On Sunday, patient states that he had some trouble breathing and felt like he had the flu.  he was afebrile.  he stayed in bed and had some stomach discomfort.  he woke up this am with worsening of his slurred speech and left sided facial droop and mild weakness on the left.  NIHSS 4 AND MRS 0, stroke code called out of window for tpa, called for MICU for neuro check, no fever        PAST MEDICAL & SURGICAL HISTORY:  Aflutter not on AC  DM    SOCIAL HX:   Smoking     -ve                    ETOH       -ve                     Other  -ve    FAMILY HISTORY:  :  No known cardiovacular family hisotry     ROS:  See HPI     Allergies    Allergy Status Unknown    Intolerances          PHYSICAL EXAM    ICU Vital Signs Last 24 Hrs  T(C): 36.7 (13 Apr 2020 10:08), Max: 36.7 (13 Apr 2020 10:08)  T(F): 98.1 (13 Apr 2020 10:08), Max: 98.1 (13 Apr 2020 10:08)  HR: 81 (13 Apr 2020 12:42) (81 - 100)  BP: 139/64 (13 Apr 2020 12:42) (139/64 - 235/128)  RR: 18 (13 Apr 2020 12:42) (18 - 18)  SpO2: 97% on ra (13 Apr 2020 12:42) (96% - 100%)      General: In NAD   HEENT:  YUSUF              Lymphatic system: No cervical LN   Lungs: Bilateral BS  Cardiovascular: Regular  Gastrointestinal: Soft, Positive BS  Musculoskeletal: no LE edema  Skin: Warm.  Intact  Neurological: mild dysarthia, AAOx3, Left sided weakness, 3/5, L facial droop.        LABS:                          17.3   9.94  )-----------( 258      ( 13 Apr 2020 10:20 )             49.4                                               04-13    140  |  106  |  45<H>  ----------------------------<  165<H>  4.8   |  20  |  4.2<HH>    Ca    9.0      13 Apr 2020 10:20    TPro  6.7  /  Alb  3.9  /  TBili  0.4  /  DBili  x   /  AST  20  /  ALT  19  /  AlkPhos  80  04-13      PT/INR - ( 13 Apr 2020 10:20 )   PT: 10.40 sec;   INR: 0.90 ratio         PTT - ( 13 Apr 2020 10:20 )  PTT:31.4 sec                                           CARDIAC MARKERS ( 13 Apr 2020 10:20 )  x     / 0.07 ng/mL / x     / x     / x                                                LIVER FUNCTIONS - ( 13 Apr 2020 10:20 )  Alb: 3.9 g/dL / Pro: 6.7 g/dL / ALK PHOS: 80 U/L / ALT: 19 U/L / AST: 20 U/L / GGT: x                                                                                                                                       X-Rays / ct reviewed    MEDICATIONS  (STANDING):  aspirin  chewable 81 milliGRAM(s) Oral daily  niCARdipine Infusion 5 mG/Hr (25 mL/Hr) IV Continuous <Continuous>    MEDICATIONS  (PRN):

## 2020-04-13 NOTE — ED PROVIDER NOTE - PROGRESS NOTE DETAILS
Discussed with neurology. Recommends BP control and tPA once SBP < 185. ICU paged. Awaiting call back. Discussed with ICU fellow. Will take care of pt in ICU. Discussed with neurology. Recommends BP control and tPA once SBP < 185.  tpa not to be given after d/w neuro given ? onset of sx last pm

## 2020-04-13 NOTE — ED PROVIDER NOTE - ATTENDING CONTRIBUTION TO CARE
49 M to ED with CVA sx, 49 M to ED with CVA sx, from home  sx started last pm and this AM worsening around 8:45 am  no pain, no sick contacts, he is aware and has insight into diagnosis  Sx worsening so to ED and CODE stroke activated on arrival     AVSS, exam as noted, CTAB, RRR, abdomen soft NTND, (+) bowel sounds,

## 2020-04-13 NOTE — ED PROVIDER NOTE - CLINICAL SUMMARY MEDICAL DECISION MAKING FREE TEXT BOX
d/w ICU team,  d/wDr. aaron -neuro  d/w Dr.rayden macedo  will admit to ICU,no  tpa given ? onset time

## 2020-04-13 NOTE — H&P ADULT - NSHPLABSRESULTS_GEN_ALL_CORE
17.3   9.94  )-----------( 258      ( 13 Apr 2020 10:20 )             49.4       04-13    140  |  106  |  45<H>  ----------------------------<  165<H>  4.8   |  20  |  4.2<HH>    Ca    9.0      13 Apr 2020 10:20    TPro  6.7  /  Alb  3.9  /  TBili  0.4  /  DBili  x   /  AST  20  /  ALT  19  /  AlkPhos  80  04-13                  PT/INR - ( 13 Apr 2020 10:20 )   PT: 10.40 sec;   INR: 0.90 ratio         PTT - ( 13 Apr 2020 10:20 )  PTT:31.4 sec    Lactate Trend      CARDIAC MARKERS ( 13 Apr 2020 10:20 )  x     / 0.07 ng/mL / x     / x     / x            CAPILLARY BLOOD GLUCOSE      POCT Blood Glucose.: 148 mg/dL (13 Apr 2020 10:16)

## 2020-04-14 LAB
ANION GAP SERPL CALC-SCNC: 12 MMOL/L — SIGNIFICANT CHANGE UP (ref 7–14)
ANION GAP SERPL CALC-SCNC: 15 MMOL/L — HIGH (ref 7–14)
APPEARANCE UR: CLEAR — SIGNIFICANT CHANGE UP
APTT BLD: 28 SEC — SIGNIFICANT CHANGE UP (ref 27–39.2)
BASOPHILS # BLD AUTO: 0.05 K/UL — SIGNIFICANT CHANGE UP (ref 0–0.2)
BASOPHILS NFR BLD AUTO: 0.5 % — SIGNIFICANT CHANGE UP (ref 0–1)
BILIRUB UR-MCNC: NEGATIVE — SIGNIFICANT CHANGE UP
BUN SERPL-MCNC: 41 MG/DL — HIGH (ref 10–20)
BUN SERPL-MCNC: 43 MG/DL — HIGH (ref 10–20)
CALCIUM SERPL-MCNC: 7.8 MG/DL — LOW (ref 8.5–10.1)
CALCIUM SERPL-MCNC: 8.7 MG/DL — SIGNIFICANT CHANGE UP (ref 8.5–10.1)
CHLORIDE SERPL-SCNC: 106 MMOL/L — SIGNIFICANT CHANGE UP (ref 98–110)
CHLORIDE SERPL-SCNC: 112 MMOL/L — HIGH (ref 98–110)
CHOLEST SERPL-MCNC: 266 MG/DL — HIGH (ref 100–200)
CK SERPL-CCNC: 362 U/L — HIGH (ref 0–225)
CO2 SERPL-SCNC: 19 MMOL/L — SIGNIFICANT CHANGE UP (ref 17–32)
CO2 SERPL-SCNC: 20 MMOL/L — SIGNIFICANT CHANGE UP (ref 17–32)
COLOR SPEC: YELLOW — SIGNIFICANT CHANGE UP
CREAT SERPL-MCNC: 4 MG/DL — HIGH (ref 0.7–1.5)
CREAT SERPL-MCNC: 4.4 MG/DL — CRITICAL HIGH (ref 0.7–1.5)
CRP SERPL-MCNC: 0.2 MG/DL — SIGNIFICANT CHANGE UP (ref 0–0.4)
DIFF PNL FLD: ABNORMAL
EOSINOPHIL # BLD AUTO: 0.14 K/UL — SIGNIFICANT CHANGE UP (ref 0–0.7)
EOSINOPHIL NFR BLD AUTO: 1.5 % — SIGNIFICANT CHANGE UP (ref 0–8)
GLUCOSE BLDC GLUCOMTR-MCNC: 101 MG/DL — HIGH (ref 70–99)
GLUCOSE BLDC GLUCOMTR-MCNC: 126 MG/DL — HIGH (ref 70–99)
GLUCOSE SERPL-MCNC: 114 MG/DL — HIGH (ref 70–99)
GLUCOSE SERPL-MCNC: 125 MG/DL — HIGH (ref 70–99)
GLUCOSE UR QL: ABNORMAL
HCT VFR BLD CALC: 45.9 % — SIGNIFICANT CHANGE UP (ref 42–52)
HDLC SERPL-MCNC: 35 MG/DL — LOW
HGB BLD-MCNC: 15.7 G/DL — SIGNIFICANT CHANGE UP (ref 14–18)
IMM GRANULOCYTES NFR BLD AUTO: 0.3 % — SIGNIFICANT CHANGE UP (ref 0.1–0.3)
INR BLD: 0.98 RATIO — SIGNIFICANT CHANGE UP (ref 0.65–1.3)
KETONES UR-MCNC: SIGNIFICANT CHANGE UP
LEUKOCYTE ESTERASE UR-ACNC: NEGATIVE — SIGNIFICANT CHANGE UP
LIPID PNL WITH DIRECT LDL SERPL: 199 MG/DL — HIGH (ref 4–129)
LYMPHOCYTES # BLD AUTO: 1.76 K/UL — SIGNIFICANT CHANGE UP (ref 1.2–3.4)
LYMPHOCYTES # BLD AUTO: 19.2 % — LOW (ref 20.5–51.1)
MCHC RBC-ENTMCNC: 29 PG — SIGNIFICANT CHANGE UP (ref 27–31)
MCHC RBC-ENTMCNC: 34.2 G/DL — SIGNIFICANT CHANGE UP (ref 32–37)
MCV RBC AUTO: 84.7 FL — SIGNIFICANT CHANGE UP (ref 80–94)
MONOCYTES # BLD AUTO: 0.72 K/UL — HIGH (ref 0.1–0.6)
MONOCYTES NFR BLD AUTO: 7.8 % — SIGNIFICANT CHANGE UP (ref 1.7–9.3)
NEUTROPHILS # BLD AUTO: 6.48 K/UL — SIGNIFICANT CHANGE UP (ref 1.4–6.5)
NEUTROPHILS NFR BLD AUTO: 70.7 % — SIGNIFICANT CHANGE UP (ref 42.2–75.2)
NITRITE UR-MCNC: NEGATIVE — SIGNIFICANT CHANGE UP
NRBC # BLD: 0 /100 WBCS — SIGNIFICANT CHANGE UP (ref 0–0)
PH UR: 6.5 — SIGNIFICANT CHANGE UP (ref 5–8)
PLATELET # BLD AUTO: 239 K/UL — SIGNIFICANT CHANGE UP (ref 130–400)
POTASSIUM SERPL-MCNC: 4.4 MMOL/L — SIGNIFICANT CHANGE UP (ref 3.5–5)
POTASSIUM SERPL-MCNC: 5.2 MMOL/L — HIGH (ref 3.5–5)
POTASSIUM SERPL-SCNC: 4.4 MMOL/L — SIGNIFICANT CHANGE UP (ref 3.5–5)
POTASSIUM SERPL-SCNC: 5.2 MMOL/L — HIGH (ref 3.5–5)
PROCALCITONIN SERPL-MCNC: 0.16 NG/ML — HIGH (ref 0.02–0.1)
PROT UR-MCNC: ABNORMAL
PROTHROM AB SERPL-ACNC: 11.3 SEC — SIGNIFICANT CHANGE UP (ref 9.95–12.87)
RBC # BLD: 5.42 M/UL — SIGNIFICANT CHANGE UP (ref 4.7–6.1)
RBC # FLD: 13.1 % — SIGNIFICANT CHANGE UP (ref 11.5–14.5)
SARS-COV-2 RNA SPEC QL NAA+PROBE: SIGNIFICANT CHANGE UP
SODIUM SERPL-SCNC: 141 MMOL/L — SIGNIFICANT CHANGE UP (ref 135–146)
SODIUM SERPL-SCNC: 143 MMOL/L — SIGNIFICANT CHANGE UP (ref 135–146)
SP GR SPEC: 1.04 — HIGH (ref 1.01–1.02)
TOTAL CHOLESTEROL/HDL RATIO MEASUREMENT: 7.6 RATIO — HIGH (ref 4–5.5)
TRIGL SERPL-MCNC: 200 MG/DL — HIGH (ref 10–149)
TSH SERPL-MCNC: 3.75 UIU/ML — SIGNIFICANT CHANGE UP (ref 0.27–4.2)
UROBILINOGEN FLD QL: SIGNIFICANT CHANGE UP
WBC # BLD: 9.18 K/UL — SIGNIFICANT CHANGE UP (ref 4.8–10.8)
WBC # FLD AUTO: 9.18 K/UL — SIGNIFICANT CHANGE UP (ref 4.8–10.8)

## 2020-04-14 RX ORDER — LABETALOL HCL 100 MG
200 TABLET ORAL EVERY 12 HOURS
Refills: 0 | Status: DISCONTINUED | OUTPATIENT
Start: 2020-04-14 | End: 2020-04-14

## 2020-04-14 RX ORDER — NIFEDIPINE 30 MG
30 TABLET, EXTENDED RELEASE 24 HR ORAL DAILY
Refills: 0 | Status: DISCONTINUED | OUTPATIENT
Start: 2020-04-14 | End: 2020-04-15

## 2020-04-14 RX ORDER — SODIUM CHLORIDE 9 MG/ML
1000 INJECTION INTRAMUSCULAR; INTRAVENOUS; SUBCUTANEOUS
Refills: 0 | Status: DISCONTINUED | OUTPATIENT
Start: 2020-04-14 | End: 2020-04-15

## 2020-04-14 RX ORDER — SODIUM CHLORIDE 9 MG/ML
1000 INJECTION, SOLUTION INTRAVENOUS
Refills: 0 | Status: DISCONTINUED | OUTPATIENT
Start: 2020-04-14 | End: 2020-04-14

## 2020-04-14 RX ORDER — PANTOPRAZOLE SODIUM 20 MG/1
40 TABLET, DELAYED RELEASE ORAL
Refills: 0 | Status: DISCONTINUED | OUTPATIENT
Start: 2020-04-14 | End: 2020-04-22

## 2020-04-14 RX ORDER — ASPIRIN/CALCIUM CARB/MAGNESIUM 324 MG
162 TABLET ORAL DAILY
Refills: 0 | Status: DISCONTINUED | OUTPATIENT
Start: 2020-04-14 | End: 2020-04-22

## 2020-04-14 RX ORDER — HEPARIN SODIUM 5000 [USP'U]/ML
5000 INJECTION INTRAVENOUS; SUBCUTANEOUS EVERY 8 HOURS
Refills: 0 | Status: DISCONTINUED | OUTPATIENT
Start: 2020-04-14 | End: 2020-04-22

## 2020-04-14 RX ORDER — LABETALOL HCL 100 MG
200 TABLET ORAL EVERY 12 HOURS
Refills: 0 | Status: DISCONTINUED | OUTPATIENT
Start: 2020-04-14 | End: 2020-04-17

## 2020-04-14 RX ADMIN — HEPARIN SODIUM 5000 UNIT(S): 5000 INJECTION INTRAVENOUS; SUBCUTANEOUS at 06:26

## 2020-04-14 RX ADMIN — SODIUM CHLORIDE 100 MILLILITER(S): 9 INJECTION INTRAMUSCULAR; INTRAVENOUS; SUBCUTANEOUS at 12:08

## 2020-04-14 RX ADMIN — ATORVASTATIN CALCIUM 80 MILLIGRAM(S): 80 TABLET, FILM COATED ORAL at 23:01

## 2020-04-14 RX ADMIN — HEPARIN SODIUM 5000 UNIT(S): 5000 INJECTION INTRAVENOUS; SUBCUTANEOUS at 14:22

## 2020-04-14 RX ADMIN — HEPARIN SODIUM 5000 UNIT(S): 5000 INJECTION INTRAVENOUS; SUBCUTANEOUS at 23:01

## 2020-04-14 RX ADMIN — Medication 162 MILLIGRAM(S): at 14:22

## 2020-04-14 RX ADMIN — Medication 200 MILLIGRAM(S): at 14:39

## 2020-04-14 NOTE — PROGRESS NOTE ADULT - SUBJECTIVE AND OBJECTIVE BOX
Neurocritical Care Progress Note:    1. Brief Presentation: He reports of improved left sided weakness, continues to have dysarthria and left facial. His BP still elevated, but titrating down gradually.     2. Today's Acute Problems: Left sided weakness, left facial droop, and + dysarthria    3. 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 Of note, he also reports of body aches "as if I had the flu," + chills, abdominal discomfort without N/V. He denies chest pain, cough, HA or dysuria. In ED, he was found to have /128, initial NIHSS 4. Initial CT head negative for intracranial bleed and no evidence of stroke. Patient was started on nicardipine drip but it was subsequently d/rodger per neurology request due to worsening left sided weakness. He was also found to have elevated creatinine at 4 (no baseline). He is pending COVID-19 rule out.    4-Yesterday's Plan:    5. Last 24 hour updates:    6. Medications:   aspirin enteric coated 162 milliGRAM(s) Oral daily  atorvastatin 80 milliGRAM(s) Oral at bedtime  chlorhexidine 4% Liquid 1 Application(s) Topical <User Schedule>  heparin  Injectable 5000 Unit(s) SubCutaneous every 8 hours  labetalol 200 milliGRAM(s) Oral every 12 hours  niCARdipine Infusion 5 mG/Hr IV Continuous <Continuous>  NIFEdipine XL 30 milliGRAM(s) Oral daily  pantoprazole    Tablet 40 milliGRAM(s) Oral before breakfast  sodium chloride 0.9%. 1000 milliLiter(s) IV Continuous <Continuous>      7. Ancillary Management:   Chest PT[ ]   Head of bed >35 [ ]   Out of bed to chair [ ]   PT/OT/SP Eval [ ]   Spirometry[ ]   DVT prophalaxis[ ]    8.Neuro:   Awake: Spontaneously[ ] Occasionally[ ] To Voice [ ] To painful stimuli [ ]   AIert [ ]. Following commands: 3 steps[ ], 2 steps[ ], 1 step [ ], None [ ]   Orientation: 0[ ], 1[ ], 2[ ], 3[ ]. Tracking objects with eyes: [ ]   Language:   Time off sedation for exam:   Pupils: Right   >   Left    >      Corneal:      Gag reflex:     EOMI:    NIH STROKE SCALE  Item	                                                        Score  1 a.	Level of Consciousness	               	0  1 b. LOC Questions	                                0  1 c.	LOC Commands	                               	0  2.	Best Gaze	                                        0  3.	Visual	                                                0  4.	Facial Palsy	                                        0  5 a.	Motor Arm - Left	                                0  5 b.	Motor Arm - Right	                        0  6 a.	Motor Leg - Left	                                0  6 b.	Motor Leg - Right	                                0  7.	Limb Ataxia	                                        0  8.	Sensory	                                                0  9.	Language	                                        0  10.	Dysarthria	                                        0  11.	Extinction and Inattention  	        0  ______________________________________  TOTAL	                                                        0      mRS:  0 No symptoms at all  1 No significant disability despite symptoms; able to carry out all usual duties and activities without assistance  2 Slight disability; unable to carry out all previous activities, but able to look after own affairs  3 Moderate disability; requiring some help, but able to walk without assistance  4 Moderately severe disability; unable to walk without assistance and unable to attend to own bodily needs without assistance  5 Severe disability; bedridden, incontinent and requiring constant nursing care and attention  6 Dead    ICHs:  Age >=80  GCS Score: 3-4 (2 pts)   GCS Score: 5-12 (1 pt)   ICH Volume: >30  (+) IVH  (+) Infratentorial    Ortiz&Saucedo:  Grade I = Asymptomatic, mild headache, slight nuchal rigidity  Grade II = Moderate to severe headache, nuchal rigidity, no neurological deficit other than cranial nerve palsy  Grade III = Drowiness, confusion, mild focal neurological deficit  Grade IV = Stupor, moderate to severe hemiparesis  Grade V = Coma, decerebrate posturing    m-Shannon:  Grade 0   (No Subarachnoid Hemorrhage (SAH)), No intraventricular hemorrhage (IVH), Incidence of symptomatic vasopasm 0%  Grade 1   (Focal or diffuse, thin SAH), No IVH, incidence of symptomatic vasospasm 24%  Grade 2   (Thin focal or diffuse SAH), IVH present, incidence of symptomatic vasospasm 33%  Grade 3   (Thick focal or diffuse SAH), No IVH, incidence of symptomatic vasospasm 33%  Grade 4   (Thick focal or diffuse SAH), IVH present, incidence of symptomatic vasosasm 40%    Last CTH:    Last CTA/MRA:    Last CTP:    Last MRI:    Last TCD:    Last EEG:    EVD: [ ] Kneeland: [ ]     ICP:     CPP:     Level(cm):     24hr(ml):     CSF:     W:     R:     C:     P:     G:     LA:    9. Cardiovascular:   Vital Signs Last 24 Hrs  T(C): 36 (14 Apr 2020 07:39), Max: 36 (14 Apr 2020 07:39)  T(F): 96.8 (14 Apr 2020 07:39), Max: 96.8 (14 Apr 2020 07:39)  HR: 82 (14 Apr 2020 16:00) (78 - 90)  BP: 163/90 (14 Apr 2020 16:00) (163/90 - 215/113)  BP(mean): --  RR: 18 (14 Apr 2020 16:00) (17 - 18)  SpO2: 98% (14 Apr 2020 16:00) (96% - 99%)     Last Echo:    Last EKG:    CVP   MAP/CPP/SBP target:   CO:      CI:       Enzymes/Trop:    10. Respiratory:   ABG:    VBG:    Chest Xray:        Peak Pressure/Buckley Pressure:    11.GI:    Prophalaxis:     Bowel mvt:     Abd distension:   LIVER FUNCTIONS - ( 13 Apr 2020 10:20 )  Alb: 3.9 g/dL / Pro: 6.7 g/dL / ALK PHOS: 80 U/L / ALT: 19 U/L / AST: 20 U/L / GGT: x             12.Renal/Fluids/Electrolytes:    04-14    143  |  112<H>  |  41<H>  ----------------------------<  125<H>  4.4   |  19  |  4.0<H>    Ca    7.8<L>      14 Apr 2020 16:00    TPro  6.7  /  Alb  3.9  /  TBili  0.4  /  DBili  x   /  AST  20  /  ALT  19  /  AlkPhos  80  04-13    I&O's Summary    14 Apr 2020 07:01  -  14 Apr 2020 19:35  --------------------------------------------------------  IN: 200 mL / OUT: 0 mL / NET: 200 mL      13.ID:   T(C): 36 (14 Apr 2020 07:39), Max: 36 (14 Apr 2020 07:39)  T(F): 96.8 (14 Apr 2020 07:39), Max: 96.8 (14 Apr 2020 07:39)    Lines: Central[] Date inserted: Peripheral[]    14. Hematology:                         15.7   9.18  )-----------( 239      ( 14 Apr 2020 06:00 )             45.9      04-14    143  |  112<H>  |  41<H>  ----------------------------<  125<H>  4.4   |  19  |  4.0<H>    Ca    7.8<L>      14 Apr 2020 16:00    TPro  6.7  /  Alb  3.9  /  TBili  0.4  /  DBili  x   /  AST  20  /  ALT  19  /  AlkPhos  80  04-13     PT/INR - ( 14 Apr 2020 12:35 )   PT: 11.30 sec;   INR: 0.98 ratio         PTT - ( 14 Apr 2020 12:35 )  PTT:28.0 sec    DVT Prophylaxis Lovenox[ ] Heparin[ ] Venodynes[ ] SCD's[ ]        1. Chief Complaint: Left sided weakness and slurred speech      HPI:  48 y/o man with pmhx of DMII not on medications and RAE not on CPAP presents from home for slurred speech and left sided facial droop.He states that on Saturday night at 9pm, he was home with his wife who noted him drooling with slurred speech. He did not seek medical attention at the time and went to sleep.  On Sunday, patient states that he had SOB and felt tired with body aches "as if I had the flu". He was afebrile but had chills. He also had some abdominal discomfort without N/V. He denies chest pain, cough, HA or dysuria. He has a hx of hematuria (reason why he d/rodger DMII medications) and proteinuria.In ED, NIHSS 4 AND MRS 0, CT head negative for intracranial bleed, no evidence of stroke. BP was elevated ( 235/128) and he was started on nicardipine drip but subsequently d/rodger per neurology request. Rest of VS wnl, patient on room air saturating 98% with RR 18/minLabs consistent with elevated creatinine at 4 ( no baseline).      2. Relevant PMH:   Prior ischemic stroke/TIA[ ], Afib [ ], CAD [ ], HTN [ ], DLD [ ], DM [x ], PVD [ ], Obesity [ ],   Sedentary lifestyle [ ], CHF [ ], RAE [x ], Cancer Hx [ ].    3. Social History: Smoking [ ], Drug Use [ ], Alcohol Use [ ], Other [ ] Denies      4. Possible Location of Stroke: Unknown at this time will have a better understanding post stroke workup.       5. Relevant Brain Tissue Imaging  < from: CT Brain Stroke Protocol (04.13.20 @ 10:35) >  FINDINGS:    The ventricles and sulci are appropriate for the patient's age.    Bilateral, relatively symmetric diminished density is demonstrated in the white matter of the corona radiata extending along the external capsule regions. The appearance suggests chronic change possibly due to remote ischemic insult. There is a separate area of diminished density within the anterior portion of the right subinsular region.    There isno evidence for intracranial hemorrhage, significant space occupying process or recent territorial infarction. Gray-white differentiation is maintained.    The bones and sinuses are essentially unremarkable. There is scant opacification the region ofthe right frontal recess. The right frontal sinus is hypoplastic.    IMPRESSION:    1.  No acute hemorrhage    2.  No acute territorial infarct    3.  Bilateral relatively symmetric diminished density within the white matter tracts of the corona radiata extending to external capsules. The appearance suggests ischemic change, but does not appear acute    :      CT Perfusion w/ Maps w/ IV Cont:   EXAM:  CT PERFUSION W MAPS IC            PROCEDURE DATE:  04/13/2020      INTERPRETATION:  Clinical History / Reason for exam: 49-year-old male. History of diabetes and atrial flutter. Dysphagia. Left-sided weakness and left facial droop    TECHNIQUE: Axial contiguous images obtained through the neck from an intravenous administration 100 cc of Optiray. Sagittal, coronal and three-dimensional reformations..    FINDINGS:    Aortic arch: There is a normal branching pattern. The origins of the great vessels are normal. The subclavian arteries are unremarkable.    Right carotid: The common carotid artery is normal. The bifurcation is normally patent. The cervical internal and external carotid arteries are normal.    Left carotid: The common carotid artery is unremarkable. The bifurcation is patent. The cervical internal and external carotid arteries are normal.    Anterior intracranial circulation:    Right ICA: The petrous, cavernous and supraclinoid portions of the ICA are normal. The ophthalmic artery is patent. There is a prominent posterior communicating supply to the posterior cerebral artery with hypoplastic P1. The M1, bifurcation and remaining branches of the middle cerebral artery are patent. The anterior cerebral arteryA1 segment is mildly hypoplastic.    Left ICA: The petrous, cavernous and supraclinoid portions of the ICA are patent. There may be mild narrowing of the para ophthalmic ICA. The ophthalmic artery and small posterior communicating arteries are patent. The middle cerebral artery, trifurcation and branches are patent. The anterior cerebral arteries patent.    The region of the anterior to indicating arteries normal.    Vertebral basilar vessels: The vertebral arteries are patent in the neck. The left vertebral artery is dominant. The right vertebral artery is small and terminates in a posterior inferior cerebellar artery. The left PICA is patent. The basilar artery, superior cerebellar and posterior cerebral arteries are patent.    The visualized brain parenchyma was evaluated on the routine head CT performed at same time.    Other findings: The submandibular and parotid glands are unremarkable. The thyroid gland is unremarkable.    The visualized upper aerodigestive tract is normal.    Therapid perfusion maps do not demonstrate evidence for brain ischemia using the Tmax > 6 sec and CBF < 30% mismatch volume criteria.    Images to the upper thorax demonstrates that the visualized pleural parenchyma and mediastinum are normal.    IMPRESSION:    1.  Patent carotid bifurcations.    2.  No large vessel occlusion    3.  No evidence for ischemia using rapid perfusion maps and the Tmax > 6 sec and CBF < 30% mismatch volume Tono STOCKTON M.D., ATTENDING RADIOLOGIST  This document has been electronically signed. Apr 13 2020 11:13AM            Repeat cth< from: CT Head No Cont (04.13.20 @ 13:14) >  COMPARISON:    Noncontrast CT scan of the brain dated April 13, 2020 time 10:27 AM    FINDINGS:    The study was performed noncontrasthowever contrast is noted as the study was performed following the perfusion examination.    Patchy foci of diminished attenuation in the basal ganglia/periventricular white matter and anterior limbs of the external capsules consistent with ischemic change, age indeterminant, unchanged from prior study.    The third, fourth, and lateral ventricles are normal in size and position.     There is no shift of the midline structures or evidence of acute intracranial hemorrhage or depressed skull fracture.     IMPRESSION:     In comparison with the prior noncontrast CT scan of the brain dated April 13, 2020 time 10:27 AM:    Stable examination.    MRI of the brain is suggested for further evaluation not contraindicated in this patient.    < end of copied text >           7. Relevant blood tests:      Hemoglobin A1C, Whole Blood: 9.2 %        8. Relevant cardiac rhythm monitoring: NSR    9. Relevant Cardiac Structure: (TTE/INDY +/-):pENDING  [ ]No intracardiac thrombus/[ ] no vegetation/[ ]no akynesia/EF:    Home Medications:        MEDICATIONS  (STANDING):  Aspirin enteric coated 325 milligram Oral daily  Atorvastatin 80 milligram Oral at bedtime  Chlorhexidine 4% Liquid 1 Application(s) Topical <User Schedule>  Heparin  Injectable 5000 Unit(s) Subcutaneous every 12 hours  Nicardipine Infusion 5 mG/Hr (25 mL/Hr) IV Continuous <Continuous>      10. PT/OT/Speech/Rehab/S&Sw/ Cognitive eval results and recommendations: Pending    11. Exam:  Patient awake alert speech clear  CN: Partial left facial droop  Power: RUE 5/5   RLE 5/5             LUE 4+/5   LLE 5-/5(left UE/LE mild drift)  Ataxia : no ataxia  Sensory : Intact  Gait: deferred  No neglect      Vital Signs Last 24 Hrs  T(C): 36.1 (13 Apr 2020 17:30), Max: 36.7 (13 Apr 2020 10:08)  T(F): 97 (13 Apr 2020 17:30), Max: 98.1 (13 Apr 2020 10:08)  HR: 86 (14 Apr 2020 03:00) (78 - 100)  BP: 212/104 (14 Apr 2020 03:00) (139/64 - 235/128)  BP(mean): --  RR: 18 (14 Apr 2020 03:00) (16 - 18)  SpO2: 98% (14 Apr 2020 03:00) (96% - 100%)    12.   NIH STROKE SCALE  Item	                                                        Score  1 a.	Level of Consciousness	               	0  1 b. LOC Questions	                                    0  1 c.	LOC Commands	                               	0  2.	Best Gaze	                                    0  3.	Visual	                                                0  4.	Facial Palsy	                                    2    5 a.	Motor Arm - Left	                        1  5 b.	Motor Arm - Right	                        0  6 a.	Motor Leg - Left	                         1        6 b.	Motor Leg - Right	                         0  7.	Limb Ataxia	                                     0  8.	Sensory	                                                 0  9.	Language	                                     0  10.	Dysarthria	                                     0  11.	Extinction and Inattention  	             0  ______________________________________  TOTAL	                                          0-->4                  Total NIHSS on admission:      NIHSS yesterday: 4      NIHSS today:  4    mRS:  0 No symptoms at all  1 No significant disability despite symptoms; able to carry out all usual duties and activities without assistance  2 Slight disability; unable to carry out all previous activities, but able to look after own affairs  3 Moderate disability; requiring some help, but able to walk without assistance  4 Moderately severe disability; unable to walk without assistance and unable to attend to own bodily needs without assistance  5 Severe disability; bedridden, incontinent and requiring constant nursing care and attention  6 Dead Neurocritical Care Progress Note:    1. Brief Presentation: He reports of improved left sided weakness, continues to have dysarthria and left facial. His BP still elevated, but titrating down gradually.     2. Today's Acute Problems: Left sided weakness, left facial droop, and + dysarthria    3. 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 Of note, he also reports of body aches "as if I had the flu," + chills, abdominal discomfort without N/V. He denies chest pain, cough, HA or dysuria. In ED, he was found to have /128, initial NIHSS 4. Initial CT head negative for intracranial bleed and no evidence of stroke. Patient was started on nicardipine drip but it was subsequently d/rodger per neurology request due to worsening left sided weakness and for sudden drop in SBP to ~170s. He was also found to have elevated creatinine at 4 (no baseline). He is pending COVID-19 rule out.    4-Yesterday's Plan:  - Telemetry monitoring  -Only restart Cardene if BP >220/110 and sustained.   -MRI brain without carmelina   -Echo with bubble study  -LDL  -Maintain strict glycemic control  -PT OT Rehab  - mg daily.   -Lipitor 80 mg QHS  -PT OT Rehab Speech eval  -Neuro checks q4h    5. Last 24 hour updates: Brain MRI is pending, patient off of cardene gtt.     6. Medications:   aspirin enteric coated 162 milliGRAM(s) Oral daily  atorvastatin 80 milliGRAM(s) Oral at bedtime  chlorhexidine 4% Liquid 1 Application(s) Topical <User Schedule>  heparin  Injectable 5000 Unit(s) SubCutaneous every 8 hours  labetalol 200 milliGRAM(s) Oral every 12 hours  niCARdipine Infusion 5 mG/Hr (25 mL/Hr) IV Continuous <Continuous>  NIFEdipine XL 30 milliGRAM(s) Oral daily  pantoprazole    Tablet 40 milliGRAM(s) Oral before breakfast  sodium chloride 0.9%. 1000 milliLiter(s) (100 mL/Hr) IV Continuous <Continuous>    7. Ancillary Management:   Chest PT[ ]   Head of bed >35 [ ]   Out of bed to chair [ ]   PT/OT/SP Eval [ ]   Spirometry[ ]   DVT prophalaxis[ ]    8.Neuro:   Mental status: Awake, alert and oriented x 4. Attention and concentration intact. Fund of knowledge appropriate.  Language: Following commands. Naming, repetition, fluency, and comprehension intact. + Dysarthria, no aphasia.  Cranial nerves: B/l pupils equally round and reactive to light to 3 mm, visual fields full, no nystagmus, extraocular muscles intact, V1 through V3 intact bilaterally and symmetric, left facial droop, hearing intact to finger rub, palate elevation symmetric, tongue was midline, sternocleidomastoid/shoulder shrug strength bilaterally 5/5.    Motor: Normal bulk and tone, strength 5/5 in bilateral upper and lower extremities.   strength 4/5 in left hand. No drift in all extremities.   Sensation: Intact to light touch, proprioception, and pinprick.  No neglect.   Coordination: No dysmetria on finger-to-nose and heel-to-shin.     NIH STROKE SCALE  Item	                                                        Score  1 a.	Level of Consciousness	               	0  1 b. LOC Questions	                                    0  1 c.	LOC Commands	                               	0  2.	Best Gaze	                                    0  3.	Visual	                                                0  4.	Facial Palsy	                                    2  5 a.	Motor Arm - Left	                        0  5 b.	Motor Arm - Right	                        0  6 a.	Motor Leg - Left	                        0  6 b.	Motor Leg - Right	                        0  7.	Limb Ataxia	                                    0  8.	Sensory	                                                0  9.	Language	                                    0  10.	Dysarthria	                                    1  11.	Extinction and Inattention  	            0  ________________________________________________________________________  TOTAL	                                                            4->3    mRS: baseline 0 ->1 No significant disability despite symptoms; able to carry out all usual duties and activities without assistance    Last CTH:  < from: CT Head No Cont (04.13.20 @ 13:14) >  IMPRESSION:   In comparison with the prior noncontrast CT scan of the brain dated April 13, 2020 time 10:27 AM:  Stable examination.  MRI of the brain is suggested for further evaluation not contraindicated in this patient.    < from: CT Brain Stroke Protocol (04.13.20 @ 10:35) >  IMPRESSION:  1.  No acute hemorrhage  2.  No acute territorial infarct  3.  Bilateral relatively symmetric diminished density within the white matter tracts of the corona radiata extending to external capsules. The appearance suggests ischemic change, but does not appear acute    Last CTA/CTP:  < from: CT Perfusion w/ Maps w/ IV Cont (04.13.20 @ 11:00) >  FINDINGS:  Aortic arch: There is a normal branching pattern. The origins of the great vessels are normal. The subclavian arteries are unremarkable.  Right carotid: The common carotid artery is normal. The bifurcation is normally patent. The cervical internal and external carotid arteries are normal.  Left carotid: The common carotid artery is unremarkable. The bifurcation is patent. The cervical internal and external carotid arteries are normal.    Anterior intracranial circulation:    Right ICA: The petrous, cavernous and supraclinoid portions of the ICA are normal. The ophthalmic artery is patent. There is a prominent posterior communicating supply to the posterior cerebral artery with hypoplastic P1. The M1, bifurcation and remaining branches of the middle cerebral artery are patent. The anterior cerebral arteryA1 segment is mildly hypoplastic.    Left ICA: The petrous, cavernous and supraclinoid portions of the ICA are patent. There may be mild narrowing of the para ophthalmic ICA. The ophthalmic artery and small posterior communicating arteries are patent. The middle cerebral artery, trifurcation and branches are patent. The anterior cerebral arteries patent.    The region of the anterior to indicating arteries normal.    Vertebral basilar vessels: The vertebral arteries are patent in the neck. The left vertebral artery is dominant. The right vertebral artery is small and terminates in a posterior inferior cerebellar artery. The left PICA is patent. The basilar artery, superior cerebellar and posterior cerebral arteries are patent.    The visualized brain parenchyma was evaluated on the routine head CT performed at same time.    Other findings: The submandibular and parotid glands are unremarkable. The thyroid gland is unremarkable.    The visualized upper aerodigestive tract is normal.    Therapid perfusion maps do not demonstrate evidence for brain ischemia using the Tmax > 6 sec and CBF < 30% mismatch volume criteria.    Images to the upper thorax demonstrates that the visualized pleural parenchyma and mediastinum are normal.    IMPRESSION:  1.  Patent carotid bifurcations.  2.  No large vessel occlusion  3.  No evidence for ischemia using rapid perfusion maps and the Tmax > 6 sec and CBF < 30% mismatch volume criteria.    Last MRI: PENDING    Last TCD: NONE    Last EEG: NONE    9. Cardiovascular:   HR: 82 (14 Apr 2020 16:00) (78 - 90)  BP: 163/90 (14 Apr 2020 16:00) (163/90 - 215/113)    Last Echo: PENDING    Last EKG:  < from: 12 Lead ECG (04.13.20 @ 16:59) >  Ventricular Rate 83 BPM  Atrial Rate 83 BPM  P-R Interval 174 ms  QRS Duration 90 ms  Q-T Interval 382 ms  QTC Calculation(Bezet) 448 ms  P Axis 40 degrees  R Axis 79 degrees  T Axis 27 degrees  Diagnosis Line Normal sinus rhythm  Normal ECG      10. Respiratory:   RR: 18 (14 Apr 2020 16:00) (17 - 18)  SpO2: 98% (14 Apr 2020 16:00) (96% - 99%)   < from: Xray Chest 1 View AP/PA (04.13.20 @ 11:23) >  Impression:    No radiographic evidence of acute cardiopulmonary disease.    11.GI:  Prophalaxis: Protonix 40 mg daily  Bowel mvt:     Abd distension: No  LIVER FUNCTIONS - ( 13 Apr 2020 10:20 )  Alb: 3.9 g/dL / Pro: 6.7 g/dL / ALK PHOS: 80 U/L / ALT: 19 U/L / AST: 20 U/L / GGT: x           12.Renal/Fluids/Electrolytes:    143  |  112<H>  |  41<H>  ----------------------------<  125<H>  4.4   |  19  |  4.0<H>    Ca    7.8<L>      14 Apr 2020 16:00    TPro  6.7  /  Alb  3.9  /  TBili  0.4  /  DBili  x   /  AST  20  /  ALT  19  /  AlkPhos  80  04-13    I&O's Summary    14 Apr 2020 07:01  -  14 Apr 2020 19:35  --------------------------------------------------------  IN: 200 mL / OUT: 0 mL / NET: 200 mL      13.ID:   T(C): 36 (14 Apr 2020 07:39), Max: 36 (14 Apr 2020 07:39)  T(F): 96.8 (14 Apr 2020 07:39), Max: 96.8 (14 Apr 2020 07:39)    Lines: Central[] Date inserted: Peripheral[X]    14. Hematology:                         15.7   9.18  )-----------( 239      ( 14 Apr 2020 06:00 )             45.9       PT/INR - ( 14 Apr 2020 12:35 )   PT: 11.30 sec;   INR: 0.98 ratio    PTT - ( 14 Apr 2020 12:35 )  PTT:28.0 sec    ENDO:  Hemoglobin A1C with Mean Plasma Glucose (04.13.20 @ 16:00)    Hemoglobin A1C, Whole Blood: 9.2: Method: Immunoassay    Lipid Profile in AM (04.14.20 @ 06:00)    Total Cholesterol/HDL Ratio Measurement: 7.6 Ratio    Cholesterol, Serum: 266 mg/dL    Triglycerides, Serum: 200 mg/dL    HDL Cholesterol, Serum: 35: HDL Levels >/= 60 mg/dL are considered beneficial and a "negative" risk  factor.  Effective 08/15/2018: New reference range and interpretive comment. mg/dL    Direct LDL: 199: LDL Cholesterol (mg/dL) --- Interpretive Comment (for adults 18 and over)      DVT Prophylaxis Lovenox[ ] Heparin[X ] Venodynes[ ] SCD's[ ] Neurocritical Care Progress Note:    1. Brief Presentation: He reports of improved left sided weakness, continues to have dysarthria and left facial. His BP still elevated, but titrating down gradually.     2. Today's Acute Problems: Left sided weakness, left facial droop, and + dysarthria    3. 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 Of note, he also reports of body aches "as if I had the flu," + chills, abdominal discomfort without N/V. He denies chest pain, cough, HA or dysuria. In ED, he was found to have /128, initial NIHSS 4. Initial CT head negative for intracranial bleed and no evidence of stroke. Patient was started on nicardipine drip but it was subsequently d/rodger per neurology request due to worsening left sided weakness and for sudden drop in SBP to ~170s. He was also found to have elevated creatinine at 4 (no baseline). He is pending COVID-19 rule out.    4-Yesterday's Plan:  - Telemetry monitoring  -Only restart Cardene if BP >220/110 and sustained.   -MRI brain without carmelina   -Echo with bubble study  -LDL  -Maintain strict glycemic control  -PT OT Rehab  - mg daily.   -Lipitor 80 mg QHS  -PT OT Rehab Speech eval  -Neuro checks q4h    5. Last 24 hour updates: Brain MRI is pending, patient off of cardene gtt.     6. Medications:   aspirin enteric coated 162 milliGRAM(s) Oral daily  atorvastatin 80 milliGRAM(s) Oral at bedtime  chlorhexidine 4% Liquid 1 Application(s) Topical <User Schedule>  heparin  Injectable 5000 Unit(s) SubCutaneous every 8 hours  labetalol 200 milliGRAM(s) Oral every 12 hours  niCARdipine Infusion 5 mG/Hr (25 mL/Hr) IV Continuous <Continuous>  NIFEdipine XL 30 milliGRAM(s) Oral daily  pantoprazole    Tablet 40 milliGRAM(s) Oral before breakfast  sodium chloride 0.9%. 1000 milliLiter(s) (100 mL/Hr) IV Continuous <Continuous>    7. Ancillary Management:   Chest PT[ ]   Head of bed >35 [ ]   Out of bed to chair [ ]   PT/OT/SP Eval [ ]   Spirometry[ ]   DVT prophalaxis[ ]    8.Neuro:   Mental status: Awake, alert and oriented x 4. Attention and concentration intact. Fund of knowledge appropriate.  Language: Following commands. Naming, repetition, fluency, and comprehension intact. + Dysarthria, no aphasia.  Cranial nerves: B/l pupils equally round and reactive to light to 3 mm, visual fields full, no nystagmus, extraocular muscles intact, V1 through V3 intact bilaterally and symmetric, left facial droop, hearing intact to finger rub, palate elevation symmetric, tongue was midline, sternocleidomastoid/shoulder shrug strength bilaterally 5/5.    Motor: Normal bulk and tone, strength 5/5 in bilateral upper and lower extremities.   strength 4/5 in left hand. No drift in all extremities.   Sensation: Intact to light touch, proprioception, and pinprick.  No neglect.   Coordination: No dysmetria on finger-to-nose and heel-to-shin.     NIH STROKE SCALE  Item	                                                        Score  1 a.	Level of Consciousness	               	0  1 b. LOC Questions	                                    0  1 c.	LOC Commands	                               	0  2.	Best Gaze	                                    0  3.	Visual	                                                0  4.	Facial Palsy	                                    2  5 a.	Motor Arm - Left	                        0  5 b.	Motor Arm - Right	                        0  6 a.	Motor Leg - Left	                        0  6 b.	Motor Leg - Right	                        0  7.	Limb Ataxia	                                    0  8.	Sensory	                                                0  9.	Language	                                    0  10.	Dysarthria	                                    1  11.	Extinction and Inattention  	            0  ________________________________________________________________________  TOTAL	                                                            4->3    mRS: baseline 0 ->1 No significant disability despite symptoms; able to carry out all usual duties and activities without assistance    Last CTH:  < from: CT Head No Cont (04.13.20 @ 13:14) >  IMPRESSION:   In comparison with the prior noncontrast CT scan of the brain dated April 13, 2020 time 10:27 AM:  Stable examination.  MRI of the brain is suggested for further evaluation not contraindicated in this patient.    < from: CT Brain Stroke Protocol (04.13.20 @ 10:35) >  IMPRESSION:  1.  No acute hemorrhage  2.  No acute territorial infarct  3.  Bilateral relatively symmetric diminished density within the white matter tracts of the corona radiata extending to external capsules. The appearance suggests ischemic change, but does not appear acute    Last CTA/CTP:  < from: CT Perfusion w/ Maps w/ IV Cont (04.13.20 @ 11:00) >  FINDINGS:  Aortic arch: There is a normal branching pattern. The origins of the great vessels are normal. The subclavian arteries are unremarkable.  Right carotid: The common carotid artery is normal. The bifurcation is normally patent. The cervical internal and external carotid arteries are normal.  Left carotid: The common carotid artery is unremarkable. The bifurcation is patent. The cervical internal and external carotid arteries are normal.    Anterior intracranial circulation:    Right ICA: The petrous, cavernous and supraclinoid portions of the ICA are normal. The ophthalmic artery is patent. There is a prominent posterior communicating supply to the posterior cerebral artery with hypoplastic P1. The M1, bifurcation and remaining branches of the middle cerebral artery are patent. The anterior cerebral arteryA1 segment is mildly hypoplastic.    Left ICA: The petrous, cavernous and supraclinoid portions of the ICA are patent. There may be mild narrowing of the para ophthalmic ICA. The ophthalmic artery and small posterior communicating arteries are patent. The middle cerebral artery, trifurcation and branches are patent. The anterior cerebral arteries patent.    The region of the anterior to indicating arteries normal.    Vertebral basilar vessels: The vertebral arteries are patent in the neck. The left vertebral artery is dominant. The right vertebral artery is small and terminates in a posterior inferior cerebellar artery. The left PICA is patent. The basilar artery, superior cerebellar and posterior cerebral arteries are patent.    The visualized brain parenchyma was evaluated on the routine head CT performed at same time.    Other findings: The submandibular and parotid glands are unremarkable. The thyroid gland is unremarkable.    The visualized upper aerodigestive tract is normal.    Therapid perfusion maps do not demonstrate evidence for brain ischemia using the Tmax > 6 sec and CBF < 30% mismatch volume criteria.    Images to the upper thorax demonstrates that the visualized pleural parenchyma and mediastinum are normal.    IMPRESSION:  1.  Patent carotid bifurcations.  2.  No large vessel occlusion  3.  No evidence for ischemia using rapid perfusion maps and the Tmax > 6 sec and CBF < 30% mismatch volume criteria.    Last MRI: PENDING    Last TCD: NONE    Last EEG: NONE    9. Cardiovascular:   HR: 82 (14 Apr 2020 16:00) (78 - 90)  BP: 163/90 (14 Apr 2020 16:00) (163/90 - 215/113)    Last Echo: PENDING    Last EKG:  < from: 12 Lead ECG (04.13.20 @ 16:59) >  Ventricular Rate 83 BPM  Atrial Rate 83 BPM  P-R Interval 174 ms  QRS Duration 90 ms  Q-T Interval 382 ms  QTC Calculation(Bezet) 448 ms  P Axis 40 degrees  R Axis 79 degrees  T Axis 27 degrees  Diagnosis Line Normal sinus rhythm  Normal ECG      10. Respiratory:   RR: 18 (14 Apr 2020 16:00) (17 - 18)  SpO2: 98% (14 Apr 2020 16:00) (96% - 99%)   < from: Xray Chest 1 View AP/PA (04.13.20 @ 11:23) >  Impression:    No radiographic evidence of acute cardiopulmonary disease.    11.GI:  Prophalaxis: Protonix 40 mg daily  Bowel mvt:     Abd distension: No  LIVER FUNCTIONS - ( 13 Apr 2020 10:20 )  Alb: 3.9 g/dL / Pro: 6.7 g/dL / ALK PHOS: 80 U/L / ALT: 19 U/L / AST: 20 U/L / GGT: x           12.Renal/Fluids/Electrolytes:    143  |  112<H>  |  41<H>  ----------------------------<  125<H>  4.4   |  19  |  4.0<H>    Ca    7.8<L>      14 Apr 2020 16:00    TPro  6.7  /  Alb  3.9  /  TBili  0.4  /  DBili  x   /  AST  20  /  ALT  19  /  AlkPhos  80  04-13    I&O's Summary    14 Apr 2020 07:01  -  14 Apr 2020 19:35  --------------------------------------------------------  IN: 200 mL / OUT: 0 mL / NET: 200 mL      13.ID:   T(C): 36 (14 Apr 2020 07:39), Max: 36 (14 Apr 2020 07:39)  T(F): 96.8 (14 Apr 2020 07:39), Max: 96.8 (14 Apr 2020 07:39)    Lines: Central[] Date inserted: Peripheral[X]    14. Hematology:                         15.7   9.18  )-----------( 239      ( 14 Apr 2020 06:00 )             45.9       PT/INR - ( 14 Apr 2020 12:35 )   PT: 11.30 sec;   INR: 0.98 ratio    PTT - ( 14 Apr 2020 12:35 )  PTT:28.0 sec    ENDO:  Hemoglobin A1C with Mean Plasma Glucose (04.13.20 @ 16:00)    Hemoglobin A1C, Whole Blood: 9.2: Method: Immunoassay    Lipid Profile in AM (04.14.20 @ 06:00)    Total Cholesterol/HDL Ratio Measurement: 7.6 Ratio    Cholesterol, Serum: 266 mg/dL    Triglycerides, Serum: 200 mg/dL    HDL Cholesterol, Serum: 35: HDL Levels >/= 60 mg/dL are considered beneficial and a "negative" risk  factor.  Effective 08/15/2018: New reference range and interpretive comment. mg/dL    Direct LDL: 199: LDL Cholesterol (mg/dL) --- Interpretive Comment (for adults 18 and over)      DVT Prophylaxis Lovenox[ ] Heparin[X ] Venodynes[ ] SCD's[ ]

## 2020-04-14 NOTE — PROGRESS NOTE ADULT - SUBJECTIVE AND OBJECTIVE BOX
Nephrology progress note    Patient is seen and examined, events over the last 24 h noted .  no new complaints.    Allergies:  Allergy Status Unknown    Hospital Medications:   MEDICATIONS  (STANDING):  aspirin enteric coated 162 milliGRAM(s) Oral daily  atorvastatin 80 milliGRAM(s) Oral at bedtime  chlorhexidine 4% Liquid 1 Application(s) Topical <User Schedule>  heparin  Injectable 5000 Unit(s) SubCutaneous every 8 hours  niCARdipine Infusion 5 mG/Hr (25 mL/Hr) IV Continuous <Continuous>  NIFEdipine XL 30 milliGRAM(s) Oral daily  sodium chloride 0.9%. 1000 milliLiter(s) (100 mL/Hr) IV Continuous <Continuous>        VITALS:  T(F): 96.8 (04-14-20 @ 07:39), Max: 97 (04-13-20 @ 17:30)  HR: 90 (04-14-20 @ 07:39)  BP: 208/126 (04-14-20 @ 07:39)  RR: 18 (04-14-20 @ 07:39)  SpO2: 99% (04-14-20 @ 07:39)  Wt(kg): --        PHYSICAL EXAM:  Constitutional: NAD  Respiratory: CTAB, no wheezes, rales or rhonchi  Cardiovascular: S1, S2, RRR  Gastrointestinal: BS+, soft, NT/ND  Extremities: No peripheral edema  :  No hinds.       LABS:  04-14    141  |  106  |  43<H>  ----------------------------<  114<H>  5.2<H>   |  20  |  4.4<HH>    Ca    8.7      14 Apr 2020 06:00    TPro  6.7  /  Alb  3.9  /  TBili  0.4  /  DBili      /  AST  20  /  ALT  19  /  AlkPhos  80  04-13                          15.7   9.18  )-----------( 239      ( 14 Apr 2020 06:00 )             45.9       Urine Studies:      RADIOLOGY & ADDITIONAL STUDIES:

## 2020-04-14 NOTE — PROGRESS NOTE ADULT - ASSESSMENT
Impression:   48 yo man with hx of dm, ?aflutter on asa who presents with slurred speech and left facial droop. NIHSS 4. Stroke code called but was outside the window for IV TPA. Patient started on Cardene for SBP >240. On the lowest dose of Cardene patient's BP dropped to 170 systolic and his left sided weakness worsened, Stroke code called. CT head stable. Cardene stopped.  COVID 19 R/O. No acute overnight events.    Probable cause/s of Stroke:  Etiology of stroke likely related to small vessel disease, but embolic source should be ruled out.       Suggestions:   - Telemetry monitoring  -Only restart Cardene if BP >220/110 and sustained.   -MRI brain without carmelina   -Echo with bubble study  -LDL  -Maintain strict glycemic control  -PT OT Rehab  - mg daily.   -Lipitor 80 mg QHS  -PT OT Rehab Speech eval  -Neuro checks q 4    16. 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 He is pending COVID rule out. In ED, he was found to have /128, initial NIHSS 4. Initial CT head negative for intracranial bleed and no evidence of stroke. Patient was started on nicardipine drip but it was subsequently d/rodger per neurology request due to worsening left sided weakness with drop in SBP around 170. He was also found to have elevated creatinine at 4 (no baseline). Etiology of stroke likely related to small vessel disease, but embolic source should be ruled out.     SUGGESTIONS:  - c/w telemetry monitoring  - Brain MRI without Tobias  - Titrate BP slowly and gradually, DO NOT drop BP more than 15%. 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 He is pending COVID rule out. In ED, he was found to have /128, initial NIHSS 4. Initial CT head negative for intracranial bleed and no evidence of stroke. Patient was started on nicardipine drip but it was subsequently d/rodger per neurology request due to worsening left sided weakness with drop in SBP around 170. He was also found to have elevated creatinine at 4 (no baseline). Etiology of stroke likely related to small vessel disease, but embolic source should be ruled out.     SUGGESTIONS:  - c/w telemetry monitoring  - Brain MRI without Tobias  - Titrate BP slowly and gradually, DO NOT drop BP more than 15%. Do not drop SBP < 170 for now  - Echo with bubble study  - Maintain strict glycemic control  - Need PT/OT consult and eval  - c/w  mg daily and high dose statin   - Routine Neuro checks  - Only if COVID19 ruled out and if no further ICU management, may consider transfer patient to Stroke Unit     Case discussed and patient seen with attending physician   Apple Baer NP  [id number]

## 2020-04-14 NOTE — PROGRESS NOTE ADULT - ASSESSMENT
Assessment:    l side weakness/ facial droop/ Stroke out of tpa window   Flu like symptoms ro COVID  JORGE  HO Aflutter off AC?  RAE     PLAN:    CNS:  f/u neurology, ASA and statin, Neuro checks q 4     HEENT:  Oral care    PULMONARY:  HOB @ 45 degrees, keep sats 92-96%             CARDIOVASCULAR: TTE;  repeat trops. Start oral BP meds nifedipine; Target 140-200     GI: GI prophylaxis Protonix                                         Feeding: as tolerated per s/s    RENAL:  check urine lytes, alb/cr, follow up nephro. renal us. Repeat BMP today    INFECTIOUS DISEASE: FU COVID    HEMATOLOGICAL:  DVT prophylaxis.    ENDOCRINE:  Follow up FS.  Insulin protocol if needed.    MUSCULOSKELETAL: OOB to chair     CODE STATUS: FULL CODE    DISPOSITION: Possible downgrade to tele afternoon Assessment:    l side weakness/ facial droop/ Stroke out of tpa window   Flu like symptoms ro COVID  JORGE  HO Aflutter off AC?  RAE     PLAN:    CNS:  f/u neurology, ASA and statin, Neuro checks q 4     HEENT:  Oral care    PULMONARY:  HOB @ 45 degrees, keep sats 92-96%             CARDIOVASCULAR: Gentle hydration NS at 100 cc/hr;  TTE;  repeat trops. Start oral BP meds nifedipine; Target 140-200     GI: GI prophylaxis Protonix                                         Feeding: as tolerated per s/s    RENAL:  check urine lytes, alb/cr, follow up nephro. renal us. Repeat BMP today. Urine output monitoring; Alvarado or bladder scan     INFECTIOUS DISEASE: FU COVID    HEMATOLOGICAL:  DVT prophylaxis.    ENDOCRINE:  Follow up FS.  Insulin protocol if needed.    MUSCULOSKELETAL: OOB to chair; CK level    CODE STATUS: FULL CODE    DISPOSITION: Possible downgrade to tele afternoon if off nicardipin drip Assessment:    Left side weakness/ facial droop/ Stroke out of tpa window   Flu like symptoms ro COVID  JORGE  HO Aflutter off AC?  RAE     PLAN:    CNS:  f/u neurology, ASA and statin, Neuro checks q 4     HEENT:  Oral care    PULMONARY:  HOB @ 45 degrees, keep sats 92-96%             CARDIOVASCULAR: Gentle hydration NS at 100 cc/hr;  TTE;  repeat trops. Start oral BP meds nifedipine; Target 140-160    GI: GI prophylaxis Protonix                                         Feeding: as tolerated per s/s    RENAL:  check urine lytes, alb/cr, follow up nephro. renal us. Repeat BMP today. Urine output monitoring;  bladder scan     INFECTIOUS DISEASE: FU COVID    HEMATOLOGICAL:  DVT prophylaxis.    ENDOCRINE:  Follow up FS.  Insulin protocol if needed.    MUSCULOSKELETAL: OOB to chair; CK level    CODE STATUS: FULL CODE    DISPOSITION: Possible downgrade to tele afternoon if off nicardipin drip Assessment:    Left side weakness/ facial droop/ Stroke out of tpa window   Hypertensive emergency   Flu like symptoms ro COVID  JORGE  HO Aflutter off AC?  RAE     PLAN:    CNS:  f/u neurology, ASA and statin, Neuro checks q 4     HEENT:  Oral care    PULMONARY:  HOB @ 45 degrees, keep sats 92-96%             CARDIOVASCULAR: Gentle hydration NS at 100 cc/hr;  TTE;  repeat trops. Start oral BP meds nifedipine; Target 140-160    GI: GI prophylaxis Protonix                                         Feeding: as tolerated per s/s    RENAL:  check urine lytes, alb/cr, follow up nephro. renal us. Repeat BMP today. Urine output monitoring;  bladder scan     INFECTIOUS DISEASE: FU COVID    HEMATOLOGICAL:  DVT prophylaxis.    ENDOCRINE:  Follow up FS.  Insulin protocol if needed.    MUSCULOSKELETAL: OOB to chair; CK level    CODE STATUS: FULL CODE    DISPOSITION: Possible downgrade to tele afternoon if off nicardipin drip

## 2020-04-14 NOTE — PROGRESS NOTE ADULT - SUBJECTIVE AND OBJECTIVE BOX
Patient is a 49y old  Male who presents with a chief complaint of Slurred speech and left facial droop (14 Apr 2020 04:08)    Over Night Events:   Doing well. Hypertensive currently off nicardipine.       ROS:  Negative except for HPI    PHYSICAL EXAM    ICU Vital Signs Last 24 Hrs  T(C): 36.1 (13 Apr 2020 17:30), Max: 36.7 (13 Apr 2020 10:08)  T(F): 97 (13 Apr 2020 17:30), Max: 98.1 (13 Apr 2020 10:08)  HR: 87 (14 Apr 2020 06:45) (78 - 100)  BP: 200/101 (14 Apr 2020 06:45) (139/64 - 235/128)  RR: 18 (14 Apr 2020 06:45) (16 - 18)  SpO2: 97% (14 Apr 2020 06:45) (96% - 100%)    CONSTITUTIONAL:  Well nourished.  NAD    ENT:   Airway patent,   Mouth with normal mucosa.   No thrush    EYES:   Pupils equal,   Round and reactive to light.    CARDIAC:   Normal rate,   Regular rhythm.    No edema    Vascular:  Normal systolic impulse  No Carotid bruits    RESPIRATORY:   No wheezing  Bilateral BS  Normal chest expansion  Not tachypneic,  No use of accessory muscles    GASTROINTESTINAL:  Abdomen soft,   Non-tender,   No guarding,   + BS    MUSCULOSKELETAL:   Range of motion is not limited,  No muscle or joint tenderness  No clubbing, cyanosis    NEUROLOGICAL:   Partial left facial droop   RUE 5/5   RLE 5/5   LUE 4/5   LLE 5-/5    SKIN:   Skin normal color for race,   Warm and dry and intact.   No evidence of rash.    PSYCHIATRIC:   Normal mood and affect.   No apparent risk to self or others.    LABS:                            17.3   9.94  )-----------( 258      ( 13 Apr 2020 10:20 )             49.4                                                 04-13    140  |  106  |  45<H>  ----------------------------<  165<H>  4.8   |  20  |  4.2<HH>    Ca    9.0      13 Apr 2020 10:20    TPro  6.7  /  Alb  3.9  /  TBili  0.4  /  DBili  x   /  AST  20  /  ALT  19  /  AlkPhos  80  04-13    PT/INR - ( 13 Apr 2020 10:20 )   PT: 10.40 sec;   INR: 0.90 ratio      PTT - ( 13 Apr 2020 10:20 )  PTT:31.4 sec                                           CARDIAC MARKERS ( 13 Apr 2020 17:07 )  x     / 0.06 ng/mL / x     / x     / x      CARDIAC MARKERS ( 13 Apr 2020 10:20 )  x     / 0.07 ng/mL / x     / x     / x                                                  LIVER FUNCTIONS - ( 13 Apr 2020 10:20 )  Alb: 3.9 g/dL / Pro: 6.7 g/dL / ALK PHOS: 80 U/L / ALT: 19 U/L / AST: 20 U/L / GGT: x                                              MEDICATIONS  (STANDING):  aspirin enteric coated 325 milliGRAM(s) Oral daily  atorvastatin 80 milliGRAM(s) Oral at bedtime  chlorhexidine 4% Liquid 1 Application(s) Topical <User Schedule>  heparin  Injectable 5000 Unit(s) SubCutaneous every 12 hours  niCARdipine Infusion 5 mG/Hr (25 mL/Hr) IV Continuous <Continuous>    MEDICATIONS  (PRN):      New X-rays reviewed:                                                                                  ECHO    CXR interpreted by me:  Hilar fullness Patient is a 49y old  Male who presents with a chief complaint of Slurred speech and left facial droop (14 Apr 2020 04:08)    Over Night Events:   Doing well. Hypertensive currently off nicardipine.       ROS:  Negative except for HPI    PHYSICAL EXAM    ICU Vital Signs Last 24 Hrs  T(C): 36.1 (13 Apr 2020 17:30), Max: 36.7 (13 Apr 2020 10:08)  T(F): 97 (13 Apr 2020 17:30), Max: 98.1 (13 Apr 2020 10:08)  HR: 87 (14 Apr 2020 06:45) (78 - 100)  BP: 200/101 (14 Apr 2020 06:45) (139/64 - 235/128)  RR: 18 (14 Apr 2020 06:45) (16 - 18)  SpO2: 97% (14 Apr 2020 06:45) (96% - 100%)    CONSTITUTIONAL:  Well nourished.  NAD    ENT:   Airway patent,   Mouth with normal mucosa.   No thrush    EYES:   Pupils equal,   Round and reactive to light.    CARDIAC:   Normal rate,   Regular rhythm.    No edema    Vascular:  Normal systolic impulse  No Carotid bruits    RESPIRATORY:   No wheezing  Bilateral BS  Normal chest expansion  Not tachypneic,  No use of accessory muscles    GASTROINTESTINAL:  Abdomen soft,   Non-tender,   No guarding,   + BS    MUSCULOSKELETAL:   Range of motion is not limited,  No muscle or joint tenderness  No clubbing, cyanosis    NEUROLOGICAL:   Partial left facial droop   RUE 5/5   RLE 5/5   LUE 4/5   LLE 5-/5    SKIN:   Skin normal color for race,   Warm and dry and intact.   No evidence of rash.    PSYCHIATRIC:   Normal mood and affect.   No apparent risk to self or others.    LABS:                            17.3   9.94  )-----------( 258      ( 13 Apr 2020 10:20 )             49.4                                                 04-13    140  |  106  |  45<H>  ----------------------------<  165<H>  4.8   |  20  |  4.2<HH>    Ca    9.0      13 Apr 2020 10:20    TPro  6.7  /  Alb  3.9  /  TBili  0.4  /  DBili  x   /  AST  20  /  ALT  19  /  AlkPhos  80  04-13    PT/INR - ( 13 Apr 2020 10:20 )   PT: 10.40 sec;   INR: 0.90 ratio      PTT - ( 13 Apr 2020 10:20 )  PTT:31.4 sec                                           CARDIAC MARKERS ( 13 Apr 2020 17:07 )  x     / 0.06 ng/mL / x     / x     / x      CARDIAC MARKERS ( 13 Apr 2020 10:20 )  x     / 0.07 ng/mL / x     / x     / x                                                  LIVER FUNCTIONS - ( 13 Apr 2020 10:20 )  Alb: 3.9 g/dL / Pro: 6.7 g/dL / ALK PHOS: 80 U/L / ALT: 19 U/L / AST: 20 U/L / GGT: x                                              MEDICATIONS  (STANDING):  aspirin enteric coated 325 milliGRAM(s) Oral daily  atorvastatin 80 milliGRAM(s) Oral at bedtime  chlorhexidine 4% Liquid 1 Application(s) Topical <User Schedule>  heparin  Injectable 5000 Unit(s) SubCutaneous every 12 hours  niCARdipine Infusion 5 mG/Hr (25 mL/Hr) IV Continuous <Continuous>    MEDICATIONS  (PRN):      New X-rays reviewed:                                                                                  ECHO    CXR interpreted by me:  No infiltrates

## 2020-04-14 NOTE — SWALLOW BEDSIDE ASSESSMENT ADULT - SWALLOW EVAL: DIAGNOSIS
+mild oral dysphagia w/ regular solids. +min overt s/s of penetration/aspiration w/ larger cup sips of thin, +toleration of small controlled sips of thin by straw, puree and soft solids w/o overt s/s of penetration/aspiration.

## 2020-04-14 NOTE — PROGRESS NOTE ADULT - ASSESSMENT
50 y/o M with JORGE in hospital for slurred speech, left side facial droop, SOB, chills but afebrile, body aches.  PMH DM, RAE, Proteinuria    # JORGE: no baseline creatinine available. last OP labs ~ 5 years ago. poor compliance with meds.   - ? etiology   - serum creatinine stable   - encourage oral intake   - strict I/O   - check UA, urine creatinine, Na, Pr/Cr ratio   - trend creatinine   - severe HTN noted, now off nicardipine drip. nifedipine xl 30 mg ordered. give 10 mg bolus of labetalol IV then start labetalol 100 mg q 12 hr. monitor BP   - Hb noted.   - potassium WNL. renal diet.   - Ca at goal. check Phos, iPTH   - h/o DM for > 10 years. not on any medications. HbA1C noted.   - slurred speech, facial drooping, weakness improved. unremarkable imaging noted. Neuro following. s/p contrast study.   - SOB, improved. now on room air. unremarkable CXR. COVID work up in progress   - avoid nephrotoxins and hyptension   - no need for RRT   - may need renal biopsy in near future.   - will follow

## 2020-04-14 NOTE — PROGRESS NOTE ADULT - SUBJECTIVE AND OBJECTIVE BOX
Stroke Progress Note:    1. Chief Complaint: Left sided weakness and slurred speech      HPI:  48 y/o man with pmhx of DMII not on medications and RAE not on CPAP presents from home for slurred speech and left sided facial droop.He states that on Saturday night at 9pm, he was home with his wife who noted him drooling with slurred speech. He did not seek medical attention at the time and went to sleep.  On Sunday, patient states that he had SOB and felt tired with body aches "as if I had the flu". He was afebrile but had chills. He also had some abdominal discomfort without N/V. He denies chest pain, cough, HA or dysuria. He has a hx of hematuria (reason why he d/rodger DMII medications) and proteinuria.In ED, NIHSS 4 AND MRS 0, CT head negative for intracranial bleed, no evidence of stroke. BP was elevated ( 235/128) and he was started on nicardipine drip but subsequently d/rodger per neurology request. Rest of VS wnl, patient on room air saturating 98% with RR 18/minLabs consistent with elevated creatinine at 4 ( no baseline).      2. Relevant PMH:   Prior ischemic stroke/TIA[ ], Afib [ ], CAD [ ], HTN [ ], DLD [ ], DM [x ], PVD [ ], Obesity [ ],   Sedentary lifestyle [ ], CHF [ ], RAE [x ], Cancer Hx [ ].    3. Social History: Smoking [ ], Drug Use [ ], Alcohol Use [ ], Other [ ] Denies      4. Possible Location of Stroke: Unknown at this time will have a better understanding post stroke workup.       5. Relevant Brain Tissue Imaging  < from: CT Brain Stroke Protocol (04.13.20 @ 10:35) >  FINDINGS:    The ventricles and sulci are appropriate for the patient's age.    Bilateral, relatively symmetric diminished density is demonstrated in the white matter of the corona radiata extending along the external capsule regions. The appearance suggests chronic change possibly due to remote ischemic insult. There is a separate area of diminished density within the anterior portion of the right subinsular region.    There isno evidence for intracranial hemorrhage, significant space occupying process or recent territorial infarction. Gray-white differentiation is maintained.    The bones and sinuses are essentially unremarkable. There is scant opacification the region ofthe right frontal recess. The right frontal sinus is hypoplastic.    IMPRESSION:    1.  No acute hemorrhage    2.  No acute territorial infarct    3.  Bilateral relatively symmetric diminished density within the white matter tracts of the corona radiata extending to external capsules. The appearance suggests ischemic change, but does not appear acute    :      CT Perfusion w/ Maps w/ IV Cont:   EXAM:  CT PERFUSION W MAPS IC            PROCEDURE DATE:  04/13/2020      INTERPRETATION:  Clinical History / Reason for exam: 49-year-old male. History of diabetes and atrial flutter. Dysphagia. Left-sided weakness and left facial droop    TECHNIQUE: Axial contiguous images obtained through the neck from an intravenous administration 100 cc of Optiray. Sagittal, coronal and three-dimensional reformations..    FINDINGS:    Aortic arch: There is a normal branching pattern. The origins of the great vessels are normal. The subclavian arteries are unremarkable.    Right carotid: The common carotid artery is normal. The bifurcation is normally patent. The cervical internal and external carotid arteries are normal.    Left carotid: The common carotid artery is unremarkable. The bifurcation is patent. The cervical internal and external carotid arteries are normal.    Anterior intracranial circulation:    Right ICA: The petrous, cavernous and supraclinoid portions of the ICA are normal. The ophthalmic artery is patent. There is a prominent posterior communicating supply to the posterior cerebral artery with hypoplastic P1. The M1, bifurcation and remaining branches of the middle cerebral artery are patent. The anterior cerebral arteryA1 segment is mildly hypoplastic.    Left ICA: The petrous, cavernous and supraclinoid portions of the ICA are patent. There may be mild narrowing of the para ophthalmic ICA. The ophthalmic artery and small posterior communicating arteries are patent. The middle cerebral artery, trifurcation and branches are patent. The anterior cerebral arteries patent.    The region of the anterior to indicating arteries normal.    Vertebral basilar vessels: The vertebral arteries are patent in the neck. The left vertebral artery is dominant. The right vertebral artery is small and terminates in a posterior inferior cerebellar artery. The left PICA is patent. The basilar artery, superior cerebellar and posterior cerebral arteries are patent.    The visualized brain parenchyma was evaluated on the routine head CT performed at same time.    Other findings: The submandibular and parotid glands are unremarkable. The thyroid gland is unremarkable.    The visualized upper aerodigestive tract is normal.    Therapid perfusion maps do not demonstrate evidence for brain ischemia using the Tmax > 6 sec and CBF < 30% mismatch volume criteria.    Images to the upper thorax demonstrates that the visualized pleural parenchyma and mediastinum are normal.    IMPRESSION:    1.  Patent carotid bifurcations.    2.  No large vessel occlusion    3.  No evidence for ischemia using rapid perfusion maps and the Tmax > 6 sec and CBF < 30% mismatch volume Tono STOCKTON M.D., ATTENDING RADIOLOGIST  This document has been electronically signed. Apr 13 2020 11:13AM            Repeat cth< from: CT Head No Cont (04.13.20 @ 13:14) >  COMPARISON:    Noncontrast CT scan of the brain dated April 13, 2020 time 10:27 AM    FINDINGS:    The study was performed noncontrasthowever contrast is noted as the study was performed following the perfusion examination.    Patchy foci of diminished attenuation in the basal ganglia/periventricular white matter and anterior limbs of the external capsules consistent with ischemic change, age indeterminant, unchanged from prior study.    The third, fourth, and lateral ventricles are normal in size and position.     There is no shift of the midline structures or evidence of acute intracranial hemorrhage or depressed skull fracture.     IMPRESSION:     In comparison with the prior noncontrast CT scan of the brain dated April 13, 2020 time 10:27 AM:    Stable examination.    MRI of the brain is suggested for further evaluation not contraindicated in this patient.    < end of copied text >           7. Relevant blood tests:      Hemoglobin A1C, Whole Blood: 9.2 %        8. Relevant cardiac rhythm monitoring: NSR    9. Relevant Cardiac Structure: (TTE/INDY +/-):pENDING  [ ]No intracardiac thrombus/[ ] no vegetation/[ ]no akynesia/EF:    Home Medications:        MEDICATIONS  (STANDING):  Aspirin enteric coated 325 milligram Oral daily  Atorvastatin 80 milligram Oral at bedtime  Chlorhexidine 4% Liquid 1 Application(s) Topical <User Schedule>  Heparin  Injectable 5000 Unit(s) Subcutaneous every 12 hours  Nicardipine Infusion 5 mG/Hr (25 mL/Hr) IV Continuous <Continuous>      10. PT/OT/Speech/Rehab/S&Sw/ Cognitive eval results and recommendations: Pending    11. Exam:  Patient awake alert speech clear  CN: Partial left facial droop  Power: RUE 5/5   RLE 5/5             LUE 4+/5   LLE 5-/5(left UE/LE mild drift)  Ataxia : no ataxia  Sensory : Intact  Gait: deferred  No neglect      Vital Signs Last 24 Hrs  T(C): 36.1 (13 Apr 2020 17:30), Max: 36.7 (13 Apr 2020 10:08)  T(F): 97 (13 Apr 2020 17:30), Max: 98.1 (13 Apr 2020 10:08)  HR: 86 (14 Apr 2020 03:00) (78 - 100)  BP: 212/104 (14 Apr 2020 03:00) (139/64 - 235/128)  BP(mean): --  RR: 18 (14 Apr 2020 03:00) (16 - 18)  SpO2: 98% (14 Apr 2020 03:00) (96% - 100%)    12.   NIH STROKE SCALE  Item	                                                        Score  1 a.	Level of Consciousness	               	0  1 b. LOC Questions	                                    0  1 c.	LOC Commands	                               	0  2.	Best Gaze	                                    0  3.	Visual	                                                0  4.	Facial Palsy	                                    2    5 a.	Motor Arm - Left	                        1  5 b.	Motor Arm - Right	                        0  6 a.	Motor Leg - Left	                         1        6 b.	Motor Leg - Right	                         0  7.	Limb Ataxia	                                     0  8.	Sensory	                                                 0  9.	Language	                                     0  10.	Dysarthria	                                     0  11.	Extinction and Inattention  	             0  ______________________________________  TOTAL	                                          0-->4                  Total NIHSS on admission:      NIHSS yesterday: 4      NIHSS today:  4    mRS:  0 No symptoms at all  1 No significant disability despite symptoms; able to carry out all usual duties and activities without assistance  2 Slight disability; unable to carry out all previous activities, but able to look after own affairs  3 Moderate disability; requiring some help, but able to walk without assistance  4 Moderately severe disability; unable to walk without assistance and unable to attend to own bodily needs without assistance  5 Severe disability; bedridden, incontinent and requiring constant nursing care and attention  6 Dead

## 2020-04-14 NOTE — PROGRESS NOTE ADULT - ASSESSMENT
Impression:   48 yo man with hx of dm, ?aflutter on asa who presents with slurred speech and left facial droop. NIHSS 4. Stroke code called but was outside the window for IV TPA. Patient started on Cardene for SBP >240. On the lowest dose of Cardene patient's BP dropped to 170 systolic and his left sided weakness worsened, Stroke code called. CT head stable. Cardene stopped.  COVID 19 R/O    Probable cause/s of Stroke:  Etiology of stroke likely related to small vessel disease, but embolic source should be ruled out.       Suggestions:   - Telemetry monitoring  -Only restart Cardene if BP >220/110 and sustained.   -MRI brain without carmelina   -Echo with bubble study  -LDL  -Maintain strict glycemic control  -PT OT Rehab  - mg daily.   -Lipitor 80 mg QHS  -PT OT Rehab Speech eval  -Neuro checks q 4    16. Disposition: Covid 19 Isolation Unit Impression:   48 yo man with hx of dm, ?aflutter on asa who presents with slurred speech and left facial droop. NIHSS 4. Stroke code called but was outside the window for IV TPA. Patient started on Cardene for SBP >240. On the lowest dose of Cardene patient's BP dropped to 170 systolic and his left sided weakness worsened, Stroke code called. CT head stable. Cardene stopped.  COVID 19 R/O. No acute overnight events.    Probable cause/s of Stroke:  Etiology of stroke likely related to small vessel disease, but embolic source should be ruled out.       Suggestions:   - Telemetry monitoring  -Only restart Cardene if BP >220/110 and sustained.   -MRI brain without carmelina   -Echo with bubble study  -LDL  -Maintain strict glycemic control  -PT OT Rehab  - mg daily.   -Lipitor 80 mg QHS  -PT OT Rehab Speech eval  -Neuro checks q 4    16. Disposition: Covid 19 Isolation Unit

## 2020-04-15 LAB
ANA TITR SER: NEGATIVE — SIGNIFICANT CHANGE UP
BACTERIA # UR AUTO: NEGATIVE — SIGNIFICANT CHANGE UP
CHLORIDE UR-SCNC: 79 — SIGNIFICANT CHANGE UP
CREAT ?TM UR-MCNC: 148 MG/DL — SIGNIFICANT CHANGE UP
D DIMER BLD IA.RAPID-MCNC: 266 NG/ML DDU — HIGH (ref 0–230)
EPI CELLS # UR: 3 /HPF — SIGNIFICANT CHANGE UP (ref 0–5)
GLUCOSE BLDC GLUCOMTR-MCNC: 109 MG/DL — HIGH (ref 70–99)
GLUCOSE BLDC GLUCOMTR-MCNC: 177 MG/DL — HIGH (ref 70–99)
GLUCOSE BLDC GLUCOMTR-MCNC: 191 MG/DL — HIGH (ref 70–99)
GLUCOSE BLDC GLUCOMTR-MCNC: 193 MG/DL — HIGH (ref 70–99)
GLUCOSE BLDC GLUCOMTR-MCNC: 98 MG/DL — SIGNIFICANT CHANGE UP (ref 70–99)
GRAN CASTS # UR COMP ASSIST: SIGNIFICANT CHANGE UP /LPF
HYALINE CASTS # UR AUTO: 3 /LPF — SIGNIFICANT CHANGE UP (ref 0–7)
LDH SERPL L TO P-CCNC: 259 U/L — HIGH (ref 50–242)
POTASSIUM UR-SCNC: 58 MMOL/L — SIGNIFICANT CHANGE UP
RBC CASTS # UR COMP ASSIST: 3 /HPF — SIGNIFICANT CHANGE UP (ref 0–4)
SODIUM UR-SCNC: 82 MMOL/L — SIGNIFICANT CHANGE UP
WBC UR QL: 4 /HPF — SIGNIFICANT CHANGE UP (ref 0–5)

## 2020-04-15 PROCEDURE — 99223 1ST HOSP IP/OBS HIGH 75: CPT

## 2020-04-15 PROCEDURE — 74176 CT ABD & PELVIS W/O CONTRAST: CPT | Mod: 26

## 2020-04-15 PROCEDURE — 76770 US EXAM ABDO BACK WALL COMP: CPT | Mod: 26

## 2020-04-15 PROCEDURE — 99291 CRITICAL CARE FIRST HOUR: CPT

## 2020-04-15 PROCEDURE — 70551 MRI BRAIN STEM W/O DYE: CPT | Mod: 26

## 2020-04-15 PROCEDURE — 93306 TTE W/DOPPLER COMPLETE: CPT | Mod: 26

## 2020-04-15 PROCEDURE — 99222 1ST HOSP IP/OBS MODERATE 55: CPT

## 2020-04-15 RX ORDER — NIFEDIPINE 30 MG
60 TABLET, EXTENDED RELEASE 24 HR ORAL DAILY
Refills: 0 | Status: DISCONTINUED | OUTPATIENT
Start: 2020-04-15 | End: 2020-04-15

## 2020-04-15 RX ORDER — INSULIN LISPRO 100/ML
VIAL (ML) SUBCUTANEOUS
Refills: 0 | Status: DISCONTINUED | OUTPATIENT
Start: 2020-04-15 | End: 2020-04-16

## 2020-04-15 RX ORDER — LABETALOL HCL 100 MG
10 TABLET ORAL ONCE
Refills: 0 | Status: COMPLETED | OUTPATIENT
Start: 2020-04-15 | End: 2020-04-15

## 2020-04-15 RX ORDER — DEXTROSE 50 % IN WATER 50 %
12.5 SYRINGE (ML) INTRAVENOUS ONCE
Refills: 0 | Status: DISCONTINUED | OUTPATIENT
Start: 2020-04-15 | End: 2020-04-22

## 2020-04-15 RX ORDER — NIFEDIPINE 30 MG
60 TABLET, EXTENDED RELEASE 24 HR ORAL DAILY
Refills: 0 | Status: DISCONTINUED | OUTPATIENT
Start: 2020-04-15 | End: 2020-04-16

## 2020-04-15 RX ORDER — SODIUM CHLORIDE 9 MG/ML
1000 INJECTION, SOLUTION INTRAVENOUS
Refills: 0 | Status: DISCONTINUED | OUTPATIENT
Start: 2020-04-15 | End: 2020-04-22

## 2020-04-15 RX ORDER — DEXTROSE 50 % IN WATER 50 %
25 SYRINGE (ML) INTRAVENOUS ONCE
Refills: 0 | Status: DISCONTINUED | OUTPATIENT
Start: 2020-04-15 | End: 2020-04-22

## 2020-04-15 RX ORDER — GLUCAGON INJECTION, SOLUTION 0.5 MG/.1ML
1 INJECTION, SOLUTION SUBCUTANEOUS ONCE
Refills: 0 | Status: DISCONTINUED | OUTPATIENT
Start: 2020-04-15 | End: 2020-04-22

## 2020-04-15 RX ORDER — LISINOPRIL 2.5 MG/1
10 TABLET ORAL DAILY
Refills: 0 | Status: DISCONTINUED | OUTPATIENT
Start: 2020-04-15 | End: 2020-04-16

## 2020-04-15 RX ORDER — INSULIN LISPRO 100/ML
7 VIAL (ML) SUBCUTANEOUS
Refills: 0 | Status: DISCONTINUED | OUTPATIENT
Start: 2020-04-15 | End: 2020-04-16

## 2020-04-15 RX ORDER — DEXTROSE 50 % IN WATER 50 %
15 SYRINGE (ML) INTRAVENOUS ONCE
Refills: 0 | Status: DISCONTINUED | OUTPATIENT
Start: 2020-04-15 | End: 2020-04-22

## 2020-04-15 RX ORDER — ACETAMINOPHEN 500 MG
650 TABLET ORAL EVERY 6 HOURS
Refills: 0 | Status: DISCONTINUED | OUTPATIENT
Start: 2020-04-15 | End: 2020-04-22

## 2020-04-15 RX ORDER — CLOPIDOGREL BISULFATE 75 MG/1
75 TABLET, FILM COATED ORAL DAILY
Refills: 0 | Status: DISCONTINUED | OUTPATIENT
Start: 2020-04-15 | End: 2020-04-22

## 2020-04-15 RX ORDER — INSULIN GLARGINE 100 [IU]/ML
21 INJECTION, SOLUTION SUBCUTANEOUS AT BEDTIME
Refills: 0 | Status: DISCONTINUED | OUTPATIENT
Start: 2020-04-15 | End: 2020-04-16

## 2020-04-15 RX ADMIN — LISINOPRIL 10 MILLIGRAM(S): 2.5 TABLET ORAL at 17:38

## 2020-04-15 RX ADMIN — HEPARIN SODIUM 5000 UNIT(S): 5000 INJECTION INTRAVENOUS; SUBCUTANEOUS at 21:58

## 2020-04-15 RX ADMIN — Medication 200 MILLIGRAM(S): at 06:48

## 2020-04-15 RX ADMIN — PANTOPRAZOLE SODIUM 40 MILLIGRAM(S): 20 TABLET, DELAYED RELEASE ORAL at 06:49

## 2020-04-15 RX ADMIN — Medication 650 MILLIGRAM(S): at 12:43

## 2020-04-15 RX ADMIN — CHLORHEXIDINE GLUCONATE 1 APPLICATION(S): 213 SOLUTION TOPICAL at 05:48

## 2020-04-15 RX ADMIN — Medication 162 MILLIGRAM(S): at 11:51

## 2020-04-15 RX ADMIN — Medication 2: at 16:58

## 2020-04-15 RX ADMIN — HEPARIN SODIUM 5000 UNIT(S): 5000 INJECTION INTRAVENOUS; SUBCUTANEOUS at 14:52

## 2020-04-15 RX ADMIN — HEPARIN SODIUM 5000 UNIT(S): 5000 INJECTION INTRAVENOUS; SUBCUTANEOUS at 06:47

## 2020-04-15 RX ADMIN — ATORVASTATIN CALCIUM 80 MILLIGRAM(S): 80 TABLET, FILM COATED ORAL at 21:58

## 2020-04-15 RX ADMIN — Medication 7 UNIT(S): at 16:58

## 2020-04-15 RX ADMIN — Medication 200 MILLIGRAM(S): at 17:37

## 2020-04-15 RX ADMIN — Medication 60 MILLIGRAM(S): at 12:31

## 2020-04-15 RX ADMIN — Medication 10 MILLIGRAM(S): at 00:53

## 2020-04-15 RX ADMIN — Medication 30 MILLIGRAM(S): at 06:49

## 2020-04-15 RX ADMIN — Medication 7 UNIT(S): at 10:56

## 2020-04-15 RX ADMIN — CLOPIDOGREL BISULFATE 75 MILLIGRAM(S): 75 TABLET, FILM COATED ORAL at 17:37

## 2020-04-15 NOTE — CONSULT NOTE ADULT - ATTENDING COMMENTS
at discharge, stop insulin, home on pioglitazone 30 mg. daily, and linagliptin 5 mg. daily, neither are metabolized by the kidney, nor do they cause any hypoglycemia. also needs one touch verio flex meter, test strips, and lancets. call dr tamayo with any questions .
I have seen and evaluated the patient  I agree with the assessment and plan as outlined
PT seen and examined  Hx of diabetes for > 10 years  reports was told had proteinuria > 1 Yr ago  poor medical f/u cant recall last time saw PMD or had blood drawn    ? all CKD due to diabetes, but seems abit out of proportion for ~ 10 yr hx of diabetes   more leiekly component of JORGE   initL WORKUP AS ABOVE  FURTHER SEROLGOGIC WORKUP TO FOLLOW    renal US  MAy need renal bx
Patient seen and examined, agree with above, l side weakness/ facial droop/ flu like symptoms ro covid, Neuro f/up, MICU monitoring

## 2020-04-15 NOTE — PROGRESS NOTE ADULT - ASSESSMENT
50 y/o man with pmhx of DMII not on medications and RAE not on CPAP presents from home for Flu like symptoms with slurred speech and left sided facial droop X 3 days.    IMPRESSION  CVA not a TPA candidate due to delayed presentation.  Hypertensive emergency   Dyslipidemia   DM type II  Flu like symptoms (resolved) COVID ruled out.  JORGE on CKD?  HO Aflutter CHADVASC 4 was never on A/C, does not f/u with a cardiologist.   RAE    PLAN:    CNS:  c/w ASA and statin, Neuro checks q 4. F/U MRI brain non con and 2D echo with bubble study. Neuro f/u. Start anticoagulation once cleared by neuro.    HEENT:  Oral care    PULMONARY:  HOB @ 45 degrees, keep sats 92-96%, outpt sleep studies.             CARDIOVASCULAR: Gentle hydration NS at 100 cc/hr;  TTE;  repeat trops. c/w labetalol 200mg q 12, increase Nifedipine to 60 mg XL; lower BP by 25% QD.  Atrial flutter with CHADVAC of 4 start therapeutic anticoagulation once cleared by neuro.    GI: GI prophylaxis Protonix. Feeding: as tolerated     RENAL:  JORGE on CKD, Rhabdo? F/U Renal us. Strict Is and Os. F/u C3-C4, KENYON, ANCA and anti GBM antibodies. Nephrology following.     INFECTIOUS DISEASE: COVID PCR negative. No ABX.    HEMATOLOGICAL:  DVT prophylaxis Hep s/c. Start therapeutic anticoagulation for Atrial flutter with CHADVAC of 4 once cleared by neuro.    ENDOCRINE:  Follow up FS.  Started Insulin protocol A1c 9.9.     MUSCULOSKELETAL: OOB to chair;     CODE STATUS: FULL CODE    DISPOSITION: Downgrade to stroke Unit. 48 y/o man with pmhx of DMII not on medications and RAE not on CPAP presents from home for flu like symptoms with slurred speech and left sided facial droop X 3 days.    IMPRESSION  CVA not a TPA candidate due to delayed presentation  Hypertensive emergency   Dyslipidemia   DM type II  Flu like symptoms (resolved) - COVID negative  JORGE on CKD?  H/O Aflutter CHADVASC 4 was never on A/C, does not f/u with a cardiologist  RAE      PLAN:    CNS:  c/w ASA and statin, Neuro checks q 4. F/U MRI brain non con and 2D echo with bubble study. Neuro f/u. Start anticoagulation once cleared by neuro.    HEENT:  Oral care    PULMONARY:  HOB @ 45 degrees, keep sats 92-96%, outpt sleep studies.             CARDIOVASCULAR: Gentle hydration NS at 100 cc/hr;  TTE;  repeat trops. c/w labetalol 200mg q 12, increase Nifedipine to 60 mg XL; lower BP by 25% QD.  History of atrial flutter now with CHADVASC of 4 start therapeutic anticoagulation once cleared by neuro.    GI: GI prophylaxis Protonix. Feeding: as tolerated     RENAL:  JORGE on CKD, Rhabdo? F/U Renal us. Strict Is and Os. F/u C3-C4, KENYON, ANCA and anti GBM antibodies. Nephrology following.     INFECTIOUS DISEASE: COVID PCR negative. No ABX.    HEMATOLOGICAL:  DVT prophylaxis Hep s/c. Start therapeutic anticoagulation for Atrial flutter with CHADVAC of 4 once cleared by neuro.    ENDOCRINE:  Follow up FS.  Started Insulin protocol A1c 9.9.     MUSCULOSKELETAL:  OOB to chair    CODE STATUS:  FULL CODE    DISPOSITION:  Downgrade to Stroke Unit 50 y/o man with pmhx of DMII not on medications and RAE not on CPAP presents from home for flu like symptoms with slurred speech and left sided facial droop X 3 days.    IMPRESSION  CVA not a TPA candidate due to delayed presentation  Hypertensive emergency   Dyslipidemia   DM type II  Flu like symptoms (resolved) - COVID negative  JORGE on CKD?  H/O Aflutter CHADVASC 4 was never on A/C, does not f/u with a cardiologist  RAE      PLAN:    CNS:  c/w ASA and statin, Neuro checks q 4. F/U MRI brain non con and 2D echo with bubble study. Neuro f/u. Start anticoagulation once cleared by neuro.    HEENT:  Oral care    PULMONARY:  HOB @ 45 degrees, keep sats 92-96%, outpt sleep studies.             CARDIOVASCULAR: Gentle hydration NS at 100 cc/hr;  TTE;  repeat trops. c/w labetalol 200mg q 12, increase Nifedipine to 60 mg XL; lower BP by 25% QD.  History of atrial flutter and now multiple lesions on MRI with CHADVASC of 4 start therapeutic anticoagulation once cleared by neuro.    GI: GI prophylaxis Protonix. Feeding: as tolerated     RENAL:  JORGE on CKD, Rhabdo? F/U Renal us. Strict Is and Os. F/u C3-C4, KENYON, ANCA and anti GBM antibodies. Nephrology following.     INFECTIOUS DISEASE: COVID PCR negative. No ABX.    HEMATOLOGICAL:  DVT prophylaxis Hep s/c. Start therapeutic anticoagulation for Atrial flutter with CHADVAC of 4 once cleared by neuro.    ENDOCRINE:  Follow up FS.  Started Insulin protocol A1c 9.9.     MUSCULOSKELETAL:  OOB to chair    CODE STATUS:  FULL CODE    DISPOSITION:  Downgrade to Stroke Unit. Clinical picture including CT scan, CXR, labwork and negative COVID test make clinical picture of COVID disease very low.

## 2020-04-15 NOTE — SWALLOW BEDSIDE ASSESSMENT ADULT - SLP PERTINENT HISTORY OF CURRENT PROBLEM
Pt admitted w/ dysarthria, left facial droop and flu like symptoms. Pt is COVID (-) went for MRI today: Bilateral focal/lacunar acute and subacute infarcts as detailed above compatible with cardioembolic infarcts. No hemorrhagic transformation.
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
## 2020-04-15 NOTE — SWALLOW BEDSIDE ASSESSMENT ADULT - SWALLOW EVAL: DIAGNOSIS
+min overt s/s of penetration/aspiration w/ large sips of thin, +toleration of small controlled sips of thin, and soft solids w/o any overt s/s of penetration/aspiration

## 2020-04-15 NOTE — SWALLOW BEDSIDE ASSESSMENT ADULT - ORAL PREPARATORY PHASE
Within functional limits
slight w/ thin by cup/Refuses to accept bolus into oral cavity/Anterior loss of bolus

## 2020-04-15 NOTE — PROGRESS NOTE ADULT - ASSESSMENT
50 y/o M with JORGE in hospital for slurred speech, left side facial droop, SOB, chills but afebrile, body aches.  PMH DM, RAE, Proteinuria    # JORGE: no baseline creatinine available. last OP labs ~ 5 years ago. poor compliance with meds.   - ? etiology   - serum creatinine stable   - encourage oral intake   - strict I/O   - check UA, urine creatinine, Na, Pr/Cr ratio   - trend creatinine   - severe HTN noted, now off nicardipine drip. nifedipine xl 30 mg ordered. give 10 mg bolus of labetalol IV then start labetalol 100 mg q 12 hr. monitor BP   - Hb noted.   - potassium WNL. renal diet.   - Ca at goal. check Phos, iPTH   - h/o DM for > 10 years. not on any medications. HbA1C noted.   - slurred speech, facial drooping, weakness improved. unremarkable imaging noted. Neuro following. s/p contrast study.   - SOB, improved. now on room air. unremarkable CXR. COVID work up in progress   - avoid nephrotoxins and hyptension   - no need for RRT   - may need renal biopsy in near future.   - will follow 48 y/o M with JORGE in hospital for slurred speech, left side facial droop, SOB, chills but afebrile, body aches.  PMH DM, RAE, Proteinuria    # JORGE: no baseline creatinine available. last OP labs ~ 5 years ago. poor compliance with meds.   - ? etiology   - serum creatinine stable   - encourage oral intake   - strict I/O   - check UA, urine creatinine, Na, Pr/Cr ratio   - trend creatinine   - severe HTN noted, on nIfedipine xl increase 90 to q24h and increase labetalol to 200 mg po q8h    - Hb noted.   - potassium WNL. renal diet.   - Ca at goal. check Phos, iPTH   - h/o DM for > 10 years. not on any medications. HbA1C noted.   - slurred speech, facial drooping, weakness improved. unremarkable imaging noted. Neuro following. s/p contrast study.   - avoid nephrotoxins and hyptension   - no need for RRT   - will follow

## 2020-04-15 NOTE — SWALLOW BEDSIDE ASSESSMENT ADULT - ORAL PHASE
Within functional limits
Within functional limits/prolonged mastication w/ solids, mild oral residue w/ solids

## 2020-04-15 NOTE — SWALLOW BEDSIDE ASSESSMENT ADULT - PHARYNGEAL PHASE
Cough post oral intake/w/ large consecutive sips
w/ large cup sips of thin only/Cough post oral intake

## 2020-04-15 NOTE — CONSULT NOTE ADULT - ASSESSMENT
IMPRESSION: Rehab of CVA L [facility]     PRECAUTIONS: [   x ] Cardiac  [    ] Respiratory  [    ] Seizures [    ] Contact Isolation  [    ] Droplet Isolation  [    ] Other    Weight Bearing Status:     RECOMMENDATION: OOB ONCE BP BETTER CONTROLLED    Out of Bed to Chair     DVT/Decubiti Prophylaxis    REHAB PLAN:     [   x  ] Bedside P/T 3-5 times a week   [   x  ] Bedside O/T  2-3 times a week   [     ] No Rehab Therapy Indicated   [     ]  Speech Therapy   Conditioning/ROM                                 ADL  Bed Mobility                                            Conditioning/ROM  Transfers                                                  Bed Mobility  Sitting /Standing Balance                      Transfers                                        Gait Training                                            Sitting/Standing Balance  Stair Training [   ]Applicable                 Home equipment Eval                                                                     Splinting  [   ] Only      GOALS:   ADL   [  x  ]   Independent         Transfers  [   x ] Independent            Ambulation  [  x   ] Independent     [ x    ] With device                            [    ]  CG                                               [    ]  CG                                                    [     ] CG                            [    ] Min A                                          [    ] Min A                                                [     ] Min  A          DISCHARGE PLAN:   [     ]  Good candidate for Intensive Rehabilitation/Hospital based                                             Will tolerate 3hrs Intensive Rehab Daily                                       [      ]  Short Term Rehab in Skilled Nursing Facility                                       [   x   ]  Home with Outpatient or VN services                                    SISTER IS OCCUPATIONAL THERAPIST WHO CAN ADDRESS LUE FINE COORDINATION                                        DEFICITS                                       [      ]  Possible Candidate for Intensive Hospital based Rehab

## 2020-04-15 NOTE — SWALLOW BEDSIDE ASSESSMENT ADULT - NS SPL SWALLOW CLINIC TRIAL FT
+min overt s/s of penetration/aspiration w/ large consecutive sips of thin, +toleration of small controlled sips and soft solids w/o overt s/s of penetration/aspiration
Mild oral dysphagia for regular solids, min overt s/s of penetration/aspiration w/ large sip of thin by cup, +toleration of controlled sips of thin by straw, puree and soft solids w/o overt s/s of penetration/aspiration.

## 2020-04-15 NOTE — CONSULT NOTE ADULT - REASON FOR ADMISSION
Slurred speech and left facial droop

## 2020-04-15 NOTE — CONSULT NOTE ADULT - ASSESSMENT
50 y/o m with left UPJ obstruction    A) Uncontrolled DM  Un controlled Hypertension  Left hydronephrosis with parenchymal loss  b/l nephrolithiasis  JORGE/CKD  Hx of hematuria 2/2 DM meds    P) No acute urologic intervention at this time  consider MAG 3 renal scan with lasix washout to determine left renal function.  consider left percutaneous nephrostomy if left renal function is borderline to see if it   improves with it being unobstructed.  If kidney function is such that the kidney can be saved OP f/u for possible robotic  pyeloplasty, if kidney is not functionally viable then consider left nephrectomy.  f/u at [address] [place] [address], [phone]  Will d/w attending

## 2020-04-15 NOTE — SWALLOW BEDSIDE ASSESSMENT ADULT - SLP GENERAL OBSERVATIONS
Pt received in bed asleep, woke to verbal stim, reported toleration of diet
Pt received in bed awake and alert, reports improved speech yet not back to baseline

## 2020-04-15 NOTE — PROGRESS NOTE ADULT - ASSESSMENT
A 49 years old right handed man with uncontrolled DM (A1C 9.2) and not on meds d/t "prior hematuria/proteinuria", hypercholesterolemia, DLD, RAE, Aflutter who presents with slurred speech, left sided facial droop, and left sided hemiparesis, body aches and chills. COVID negative. Brain MRI reports of acute lacunar infarct of right BG and CR and subacute infarcts in left CR. Of note, in ED patient with hypertensive urgency and was started on Cardene drip but discontinued per Neuro d/t worsening left sided weakness.     SUGGESTIONS: A 49 years old right handed man with uncontrolled DM (A1C 9.2), DLD, RAE, Aflutter who presents with slurred speech, left sided facial droop, and left sided hemiparesis, body aches and chills. In ED, patient with hypertensive urgency and was started on Cardene drip but discontinued per Neuro d/t worsening left sided weakness. On brain MRI he is found to have acute right lacunar infarcts in both anterior and posterior circulation. Of note he is with history of Aflutter and risk factors including DM, HTN, cannot exclude possible etiology of atypical embolic source and therefore further work up is warranted. Of note, patient had COVID PCR negative, remains afebrile but he is with persistent cough.     SUGGESTIONS:  - Routine Neuro checks and Continuous telemonitoring   - Can consider transfer to Stroke unit under Dr. Valencia as admitting physician, if no further ICU management AND if confirmed COVID suspicion is negative. Recommend obtaining serum ferritin, LDH, ESR, CRP, fibrinogen, D-dimer, CK, CBC with diff  - c/w  mg daily and start Plavix 75 mg daily, and high dose statin  - Start Lisinopril 10 mg PO daily, will re-evaluate need for nifedipine and labetalol  - Notify our team if SBP > 200   - INDY with Bubble Study  - Transcranial doppler with contrast for evaluation of shunt tomorrow  - Obtain hypercoagulable work up   - Consult endocrinology for DM evaluation and management  - Follow up with nephrology and urology team's recommendation in regards to severe left hydronephrosis  - Maintain strict glycemic control  - Consult PT/OT/Speech and swallow for evaluation and management    Case discussed and patient seen with attending physician   Apple Baer NP  [phone]

## 2020-04-15 NOTE — CONSULT NOTE ADULT - SUBJECTIVE AND OBJECTIVE BOX
Patient is a 49y old  Male who presents with a chief complaint of Slurred speech and left facial droop (15 Apr 2020 09:48)    HPI:  48 y/o man with pmhx of DMII not on medications and RAE not on CPAP presents from home for slurred speech and left sided facial droop.  He states that on Saturday night at 9pm, he was home with his wife who noted him drooling with slurred speech. He did not seek medical attention at the time and went to sleep.  On , patient states that he had SOB and felt tired with body aches "as if I had the flu". He was afebrile but had chills. He also had some abdominal discomfort without N/V. He denies chest pain, cough, HA or dysuria. He has a hx of hematuria (reason why he d/rodger DMII medications) and proteinuria.  In ED, NIHSS 4 AND MRS 0, CT head negative for intracranial bleed, no evidence of stroke. BP was elevated ( 235/128) and he was started on nicardipine drip but subsequently d/rodger per neurology request. Rest of VS wnl, patient on room air saturating 98% with RR 18/min  Labs consistent with elevated creatinine at 4 ( no baseline). (2020 14:11)      PAST MEDICAL & SURGICAL HISTORY:  Proteinuria  RAE (obstructive sleep apnea)  DM (diabetes mellitus)      Hospital Course: CVA not a TPA candidate due to delayed presentation  Hypertensive emergency   Dyslipidemia   DM type II  Flu like symptoms (resolved) - COVID negative  JORGE on CKD?  H/O Aflutter CHADVASC 4 was never on A/C, does not f/u with a cardiologist  RAE      PLAN:    CNS:  c/w ASA and statin, Neuro checks q 4. F/U MRI brain non con and 2D echo with bubble study. Neuro f/u. Start anticoagulation once cleared by neuro.    HEENT:  Oral care    PULMONARY:  HOB @ 45 degrees, keep sats 92-96%, outpt sleep studies.             CARDIOVASCULAR: Gentle hydration NS at 100 cc/hr;  TTE;  repeat trops. c/w labetalol 200mg q 12, increase Nifedipine to 60 mg XL; lower BP by 25% QD.  History of atrial flutter now with CHADVASC of 4 start therapeutic anticoagulation once cleared by neuro.    GI: GI prophylaxis Protonix. Feeding: as tolerated     RENAL:  JORGE on CKD, Rhabdo? F/U Renal us. Strict Is and Os. F/u C3-C4, KENYON, ANCA and anti GBM antibodies. Nephrology following.     INFECTIOUS DISEASE: COVID PCR negative. No ABX.    HEMATOLOGICAL:  DVT prophylaxis Hep s/c. Start therapeutic anticoagulation for Atrial flutter with CHADVAC of 4 once cleared by neuro.    ENDOCRINE:  Follow up FS.  Started Insulin protocol A1c 9.9.     MUSCULOSKELETAL:  OOB to chair    CODE STATUS:  FULL CODE    DISPOSITION:  Downgrade to Stroke Unit    MRI BRAIN- MULTIPLE INFARCTS CW EMBOLIC PHENOMENON  RENAL US- L HYDRONEPRHOSIS  Seen by speech Dys 3 With Thins    TODAY'S SUBJECTIVE & REVIEW OF SYMPTOMS:     Constitutional WNL   Cardio WNL   Resp WNL   GI WNL  Heme WNL  Endo WNL  Skin WNL  MSK LUE weakness  Neuro WNL  Cognitive WNL  Psych WNL      MEDICATIONS  (STANDING):  aspirin enteric coated 162 milliGRAM(s) Oral daily  atorvastatin 80 milliGRAM(s) Oral at bedtime  chlorhexidine 4% Liquid 1 Application(s) Topical <User Schedule>  dextrose 5%. 1000 milliLiter(s) (50 mL/Hr) IV Continuous <Continuous>  dextrose 50% Injectable 12.5 Gram(s) IV Push once  dextrose 50% Injectable 25 Gram(s) IV Push once  dextrose 50% Injectable 25 Gram(s) IV Push once  heparin  Injectable 5000 Unit(s) SubCutaneous every 8 hours  insulin glargine Injectable (LANTUS) 21 Unit(s) SubCutaneous at bedtime  insulin lispro (HumaLOG) corrective regimen sliding scale   SubCutaneous three times a day before meals  insulin lispro Injectable (HumaLOG) 7 Unit(s) SubCutaneous three times a day before meals  labetalol 200 milliGRAM(s) Oral every 12 hours  NIFEdipine XL 60 milliGRAM(s) Oral daily  pantoprazole    Tablet 40 milliGRAM(s) Oral before breakfast    MEDICATIONS  (PRN):  dextrose 40% Gel 15 Gram(s) Oral once PRN Blood Glucose LESS THAN 70 milliGRAM(s)/deciliter  glucagon  Injectable 1 milliGRAM(s) IntraMuscular once PRN Glucose LESS THAN 70 milligrams/deciliter      FAMILY HISTORY:  FHx: cancer of prostate      Allergies    Allergy Status Unknown    Intolerances        SOCIAL HISTORY:    [    ] Etoh  [    ] Smoking  [    ] Substance abuse     Home Environment:  [    ] Home Alone  [  x  ] Lives with Family  [    ] Home Health Aid    Dwelling:  [    ] Apartment  [ x   ] Private House  [    ] Adult Home  [    ] Skilled Nursing Facility      [    ] Short Term  [    ] Long Term  [ x   ] Stairs                           [    ] Elevator     FUNCTIONAL STATUS PTA: (Check all that apply)  Ambulation: [  x   ]Independent    [    ] Dependent     [    ] Non-Ambulatory  Assistive Device: [    ] SA Cane  [    ]  Q Cane  [    ] Walker  [    ]  Wheelchair  ADL : [  x  ] Independent  [    ]  Dependent       Vital Signs Last 24 Hrs  T(C): 37.2 (2020 22:00), Max: 37.2 (2020 22:00)  T(F): 99 (2020 22:00), Max: 99 (2020 22:00)  HR: 77 (2020 23:00) (70 - 82)  BP: 190/96 (2020 23:00) (163/90 - 210/117)  BP(mean): --  RR: 18 (2020 23:00) (17 - 18)  SpO2: 98% (2020 23:00) (98% - 99%)      PHYSICAL EXAM: Alert & Oriented X3  GENERAL: NAD, well-groomed, well-developed  HEAD:  Atraumatic, Normocephalic  EYES: EOMI, PERRLA, conjunctiva and sclera clear  NECK: Supple  CHEST/LUNG: Clear bilaterally  HEART: Regular rate and rhythm  ABDOMEN: Soft, Nontender, Nondistended; Bowel sounds present  EXTREMITIES:  no calf tenderness,no edema BLES    NERVOUS SYSTEM:  Cranial Nerves 2-12 intact [ x   ] Abnormal  [    ]  ROM: WFL all extremities [x    ]  Abnormal [     ]  Motor Strength: WFL all extremities  [    ]  Abnormal [  x  ]5/5 except LUE 4+/5  Sensation: intact to light touch [    ] Abnormal [x    ]Diminished LUE  + Drift LUE CARSON DIMINISHED LUE    FUNCTIONAL STATUS:  Bed Mobility: [   ]  Independent [   x ]  Supervision [    ]  Needs Assistance [  ]  N/A  Transfers: [    ]  Independent [    ]  Supervision [    ]  Needs Assistance [  x  ]  N/A    Ambulation:  [    ]  Independent [    ]  Supervision [    ]  Needs Assistance [  x  ]  N/A   ADL:  [    ]   Independent [    ] Requires Assistance [x    ] N/A   GOOD LONG SITTING BALANCE- SYST     LABS:                        15.7   9.18  )-----------( 239      ( 2020 06:00 )             45.9     04-14    143  |  112<H>  |  41<H>  ----------------------------<  125<H>  4.4   |  19  |  4.0<H>    Ca    7.8<L>      2020 16:00      PT/INR - ( 2020 12:35 )   PT: 11.30 sec;   INR: 0.98 ratio         PTT - ( 2020 12:35 )  PTT:28.0 sec  Urinalysis Basic - ( 2020 22:50 )    Color: Yellow / Appearance: Clear / S.036 / pH: x  Gluc: x / Ketone: Trace  / Bili: Negative / Urobili: <2 mg/dL   Blood: x / Protein: >600 mg/dL / Nitrite: Negative   Leuk Esterase: Negative / RBC: 3 /HPF / WBC 4 /HPF   Sq Epi: x / Non Sq Epi: 3 /HPF / Bacteria: Negative        RADIOLOGY & ADDITIONAL STUDIES:

## 2020-04-15 NOTE — PROGRESS NOTE ADULT - SUBJECTIVE AND OBJECTIVE BOX
TARUN SARREAL 49y Male  MRN#: [id number]   CODE STATUS:       SUBJECTIVE  Patient is a 49y old Male who presents with a chief complaint of Slurred speech and left facial droop (15 Apr 2020 09:29)    Currently admitted to medicine with the primary diagnosis of Acute CVA.    Today is hospital day 2d, and this morning he is not having any new complaints. He still has left sided facial droop with some dysarthria but his weakness on left side of his body has resolved.      OBJECTIVE  PAST MEDICAL & SURGICAL HISTORY  Proteinuria  RAE (obstructive sleep apnea)  DM (diabetes mellitus)    ALLERGIES:  Allergy Status Unknown    MEDICATIONS:  STANDING MEDICATIONS  aspirin enteric coated 162 milliGRAM(s) Oral daily  atorvastatin 80 milliGRAM(s) Oral at bedtime  chlorhexidine 4% Liquid 1 Application(s) Topical <User Schedule>  dextrose 5%. 1000 milliLiter(s) IV Continuous <Continuous>  dextrose 50% Injectable 12.5 Gram(s) IV Push once  dextrose 50% Injectable 25 Gram(s) IV Push once  dextrose 50% Injectable 25 Gram(s) IV Push once  heparin  Injectable 5000 Unit(s) SubCutaneous every 8 hours  insulin glargine Injectable (LANTUS) 21 Unit(s) SubCutaneous at bedtime  insulin lispro (HumaLOG) corrective regimen sliding scale   SubCutaneous three times a day before meals  insulin lispro Injectable (HumaLOG) 7 Unit(s) SubCutaneous three times a day before meals  labetalol 200 milliGRAM(s) Oral every 12 hours  LORazepam   Injectable 1 milliGRAM(s) IV Push once  NIFEdipine XL 60 milliGRAM(s) Oral daily  pantoprazole    Tablet 40 milliGRAM(s) Oral before breakfast    PRN MEDICATIONS  dextrose 40% Gel 15 Gram(s) Oral once PRN  glucagon  Injectable 1 milliGRAM(s) IntraMuscular once PRN      VITAL SIGNS: Last 24 Hours  T(C): 37.2 (2020 22:00), Max: 37.2 (2020 22:00)  T(F): 99 (2020 22:00), Max: 99 (2020 22:00)  HR: 77 (2020 23:00) (70 - 82)  BP: 190/96 (2020 23:00) (163/90 - 210/117)  BP(mean): --  RR: 18 (2020 23:00) (17 - 18)  SpO2: 98% (2020 23:00) (98% - 99%)    PHYSICAL EXAM:    GENERAL: NAD, well-developed, AAOx3  HEENT:  Atraumatic, Normocephalic. EOMI, PERRLA, conjunctiva and sclera clear, No JVD  PULMONARY: Clear to auscultation bilaterally; No wheeze  CARDIOVASCULAR: Regular rate and rhythm; No murmurs, rubs, or gallops  GASTROINTESTINAL: Soft, Nontender, Nondistended; Bowel sounds present  MUSCULOSKELETAL:  2+ Peripheral Pulses, No clubbing, cyanosis, or edema  NEUROLOGY: Left sided facial droop present with some dysarthria, rest of CN intact, no sensory deficits, power 5/5 all 4 limbs. Cerebellar signs negative Gait deferred.  SKIN: No rashes or lesions      LABS:                        15.7   9.18  )-----------( 239      ( 2020 06:00 )             45.9     04-14    143  |  112<H>  |  41<H>  ----------------------------<  125<H>  4.4   |  19  |  4.0<H>    Ca    7.8<L>      2020 16:00    TPro  6.7  /  Alb  3.9  /  TBili  0.4  /  DBili  x   /  AST  20  /  ALT  19  /  AlkPhos  80  04-13    PT/INR - ( 2020 12:35 )   PT: 11.30 sec;   INR: 0.98 ratio         PTT - ( 2020 12:35 )  PTT:28.0 sec  Urinalysis Basic - ( 2020 22:50 )    Color: Yellow / Appearance: Clear / S.036 / pH: x  Gluc: x / Ketone: Trace  / Bili: Negative / Urobili: <2 mg/dL   Blood: x / Protein: >600 mg/dL / Nitrite: Negative   Leuk Esterase: Negative / RBC: 3 /HPF / WBC 4 /HPF   Sq Epi: x / Non Sq Epi: 3 /HPF / Bacteria: Negative        Creatine Kinase, Serum: 362 U/L <H> (20 @ 10:29)      CARDIAC MARKERS ( 2020 10:29 )  x     / x     / 362 U/L / x     / x      CARDIAC MARKERS ( 2020 17:07 )  x     / 0.06 ng/mL / x     / x     / x      CARDIAC MARKERS ( 2020 10:20 )  x     / 0.07 ng/mL / x     / x     / x          RADIOLOGY:  < from: CT Head No Cont (20 @ 13:14) >  IMPRESSION:     In comparison with the prior noncontrast CT scan of the brain dated 2020 time 10:27 AM:    Stable examination.    MRI of the brain is suggested for further evaluation not contraindicated in this patient.        < end of copied text >  < from: Xray Chest 1 View AP/PA (20 @ 11:23) >  Impression:      No radiographic evidence of acute cardiopulmonary disease.      < end of copied text >  < from: CT Brain Stroke Protocol (20 @ 10:35) >  IMPRESSION:    1.  No acute hemorrhage    2.  No acute territorial infarct    3.  Bilateral relatively symmetric diminished density within the white matter tracts of the corona radiata extending to external capsules. The appearance suggests ischemic change, but does not appear acute    < end of copied text >  < from: CT Perfusion w/ Maps w/ IV Cont (20 @ 11:00) >    IMPRESSION:    1.  Patent carotid bifurcations.    2.  No large vessel occlusion    3.  No evidence for ischemia using rapid perfusion maps and the Tmax > 6 sec and CBF < 30% mismatch volume criteria.    < end of copied text > TARUN SARREAL 49y Male  MRN#: [id number]   CODE STATUS:       SUBJECTIVE  Patient is a 49y old Male who presents with a chief complaint of Slurred speech and left facial droop (15 Apr 2020 09:29)    Currently admitted to medicine with the primary diagnosis of Acute CVA.    Today is hospital day 2d, and this morning he is not having any new complaints. He still has left sided facial droop with some dysarthria but his weakness on left side of his body has resolved.      OBJECTIVE  PAST MEDICAL & SURGICAL HISTORY  Proteinuria  RAE (obstructive sleep apnea)  DM (diabetes mellitus)    ALLERGIES:  Allergy Status Unknown    MEDICATIONS:  STANDING MEDICATIONS  aspirin enteric coated 162 milliGRAM(s) Oral daily  atorvastatin 80 milliGRAM(s) Oral at bedtime  chlorhexidine 4% Liquid 1 Application(s) Topical <User Schedule>  dextrose 5%. 1000 milliLiter(s) IV Continuous <Continuous>  dextrose 50% Injectable 12.5 Gram(s) IV Push once  dextrose 50% Injectable 25 Gram(s) IV Push once  dextrose 50% Injectable 25 Gram(s) IV Push once  heparin  Injectable 5000 Unit(s) SubCutaneous every 8 hours  insulin glargine Injectable (LANTUS) 21 Unit(s) SubCutaneous at bedtime  insulin lispro (HumaLOG) corrective regimen sliding scale   SubCutaneous three times a day before meals  insulin lispro Injectable (HumaLOG) 7 Unit(s) SubCutaneous three times a day before meals  labetalol 200 milliGRAM(s) Oral every 12 hours  LORazepam   Injectable 1 milliGRAM(s) IV Push once  NIFEdipine XL 60 milliGRAM(s) Oral daily  pantoprazole    Tablet 40 milliGRAM(s) Oral before breakfast    PRN MEDICATIONS  dextrose 40% Gel 15 Gram(s) Oral once PRN  glucagon  Injectable 1 milliGRAM(s) IntraMuscular once PRN      VITAL SIGNS: Last 24 Hours  T(C): 37.2 (2020 22:00), Max: 37.2 (2020 22:00)  T(F): 99 (2020 22:00), Max: 99 (2020 22:00)  HR: 77 (2020 23:00) (70 - 82)  BP: 190/96 (2020 23:00) (163/90 - 210/117)  BP(mean): --  RR: 18 (2020 23:00) (17 - 18)  SpO2: 98% (2020 23:00) (98% - 99%)    PHYSICAL EXAM:    GENERAL: NAD, well-developed, AAOx3  HEENT:  Atraumatic, Normocephalic. EOMI, PERRLA, conjunctiva and sclera clear, No JVD  PULMONARY: Clear to auscultation bilaterally; No wheeze  CARDIOVASCULAR: Regular rate and rhythm; No murmurs, rubs, or gallops  GASTROINTESTINAL: Soft, Nontender, Nondistended; Bowel sounds present  MUSCULOSKELETAL:  2+ Peripheral Pulses, No clubbing, cyanosis, or edema  NEUROLOGY: Left sided facial droop present with some dysarthria, rest of CN intact, no sensory deficits, power 5/5 all 4 limbs. Cerebellar signs negative Gait deferred.  SKIN: No rashes or lesions      LABS:                        15.7   9.18  )-----------( 239      ( 2020 06:00 )             45.9     04-14    143  |  112<H>  |  41<H>  ----------------------------<  125<H>  4.4   |  19  |  4.0<H>    Ca    7.8<L>      2020 16:00    TPro  6.7  /  Alb  3.9  /  TBili  0.4  /  DBili  x   /  AST  20  /  ALT  19  /  AlkPhos  80  04-13    PT/INR - ( 2020 12:35 )   PT: 11.30 sec;   INR: 0.98 ratio      PTT - ( 2020 12:35 )  PTT:28.0 sec      Urinalysis Basic - ( 2020 22:50 )  Color: Yellow / Appearance: Clear / S.036 / pH: x  Gluc: x / Ketone: Trace  / Bili: Negative / Urobili: <2 mg/dL   Blood: x / Protein: >600 mg/dL / Nitrite: Negative   Leuk Esterase: Negative / RBC: 3 /HPF / WBC 4 /HPF   Sq Epi: x / Non Sq Epi: 3 /HPF / Bacteria: Negative        Creatine Kinase, Serum: 362 U/L <H> (20 @ 10:29)      CARDIAC MARKERS ( 2020 10:29 )  x     / x     / 362 U/L / x     / x      CARDIAC MARKERS ( 2020 17:07 )  x     / 0.06 ng/mL / x     / x     / x      CARDIAC MARKERS ( 2020 10:20 )  x     / 0.07 ng/mL / x     / x     / x            RADIOLOGY:  < from: CT Head No Cont (20 @ 13:14) >  IMPRESSION:     In comparison with the prior noncontrast CT scan of the brain dated 2020 time 10:27 AM:    Stable examination.    MRI of the brain is suggested for further evaluation not contraindicated in this patient.      < end of copied text >        < from: Xray Chest 1 View AP/PA (20 @ 11:23) >  Impression:      No radiographic evidence of acute cardiopulmonary disease.      < end of copied text >        < from: CT Brain Stroke Protocol (20 @ 10:35) >  IMPRESSION:    1.  No acute hemorrhage    2.  No acute territorial infarct    3.  Bilateral relatively symmetric diminished density within the white matter tracts of the corona radiata extending to external capsules. The appearance suggests ischemic change, but does not appear acute    < end of copied text >          < from: CT Perfusion w/ Maps w/ IV Cont (20 @ 11:00) >    IMPRESSION:    1.  Patent carotid bifurcations.    2.  No large vessel occlusion    3.  No evidence for ischemia using rapid perfusion maps and the Tmax > 6 sec and CBF < 30% mismatch volume criteria.    < end of copied text >

## 2020-04-15 NOTE — PROGRESS NOTE ADULT - SUBJECTIVE AND OBJECTIVE BOX
Nephrology progress note    THIS IS AN INCOMPLETE NOTE . FULL NOTE TO FOLLOW SHORTLY    Patient is seen and examined, events over the last 24 h noted .    Allergies:  Allergy Status Unknown    Hospital Medications:   MEDICATIONS  (STANDING):  aspirin enteric coated 162 milliGRAM(s) Oral daily  atorvastatin 80 milliGRAM(s) Oral at bedtime  chlorhexidine 4% Liquid 1 Application(s) Topical <User Schedule>  dextrose 5%. 1000 milliLiter(s) (50 mL/Hr) IV Continuous <Continuous>  dextrose 50% Injectable 12.5 Gram(s) IV Push once  dextrose 50% Injectable 25 Gram(s) IV Push once  dextrose 50% Injectable 25 Gram(s) IV Push once  heparin  Injectable 5000 Unit(s) SubCutaneous every 8 hours  insulin glargine Injectable (LANTUS) 21 Unit(s) SubCutaneous at bedtime  insulin lispro (HumaLOG) corrective regimen sliding scale   SubCutaneous three times a day before meals  insulin lispro Injectable (HumaLOG) 7 Unit(s) SubCutaneous three times a day before meals  labetalol 200 milliGRAM(s) Oral every 12 hours  LORazepam   Injectable 1 milliGRAM(s) IV Push once  NIFEdipine XL 60 milliGRAM(s) Oral daily  pantoprazole    Tablet 40 milliGRAM(s) Oral before breakfast        VITALS:  T(F): 99 (20 @ 22:00), Max: 99 (20 @ 22:00)  HR: 77 (20 @ 23:00)  BP: 190/96 (20 @ 23:00)  RR: 18 (20 @ 23:00)  SpO2: 98% (20 @ 23:00)  Wt(kg): --     @ 07:01  -  04-15 @ 07:00  --------------------------------------------------------  IN: 200 mL / OUT: 0 mL / NET: 200 mL          PHYSICAL EXAM:  Constitutional: NAD  HEENT: anicteric sclera, oropharynx clear, MMM  Neck: No JVD  Respiratory: CTAB, no wheezes, rales or rhonchi  Cardiovascular: S1, S2, RRR  Gastrointestinal: BS+, soft, NT/ND  Extremities: No cyanosis or clubbing. No peripheral edema  :  No hinds.   Skin: No rashes    LABS:      143  |  112<H>  |  41<H>  ----------------------------<  125<H>  4.4   |  19  |  4.0<H>    Ca    7.8<L>      2020 16:00    TPro  6.7  /  Alb  3.9  /  TBili  0.4  /  DBili      /  AST  20  /  ALT  19  /  AlkPhos  80                            15.7   9.18  )-----------( 239      ( 2020 06:00 )             45.9       Urine Studies:  Urinalysis Basic - ( 2020 22:50 )    Color: Yellow / Appearance: Clear / S.036 / pH:   Gluc:  / Ketone: Trace  / Bili: Negative / Urobili: <2 mg/dL   Blood:  / Protein: >600 mg/dL / Nitrite: Negative   Leuk Esterase: Negative / RBC: 3 /HPF / WBC 4 /HPF   Sq Epi:  / Non Sq Epi: 3 /HPF / Bacteria: Negative        RADIOLOGY & ADDITIONAL STUDIES: Nephrology progress note  Patient is seen and examined, events over the last 24 h noted .  no new clinical events     Allergies:  Allergy Status Unknown    Hospital Medications:   MEDICATIONS  (STANDING):  aspirin enteric coated 162 milliGRAM(s) Oral daily  atorvastatin 80 milliGRAM(s) Oral at bedtime  dextrose 5%. 1000 milliLiter(s) (50 mL/Hr) IV Continuous <Continuous>  heparin  Injectable 5000 Unit(s) SubCutaneous every 8 hours  insulin glargine Injectable (LANTUS) 21 Unit(s) SubCutaneous at bedtime  insulin lispro (HumaLOG) corrective regimen sliding scale   SubCutaneous three times a day before meals  insulin lispro Injectable (HumaLOG) 7 Unit(s) SubCutaneous three times a day before meals  labetalol 200 milliGRAM(s) Oral every 12 hours  LORazepam   Injectable 1 milliGRAM(s) IV Push once  NIFEdipine XL 60 milliGRAM(s) Oral daily  pantoprazole    Tablet 40 milliGRAM(s) Oral before breakfast        VITALS:  T(F): 99 (20 @ 22:00), Max: 99 (20 @ 22:00)  HR: 77 (20 @ 23:00)  BP: 190/96 (20 @ 23:00)  RR: 18 (20 @ 23:00)  SpO2: 98% (20 @ 23:00)       @ 07:01  -  04-15 @ 07:00  --------------------------------------------------------  IN: 200 mL / OUT: 0 mL / NET: 200 mL          PHYSICAL EXAM:  Constitutional: NAD  HEENT: anicteric sclera, oropharynx clear, MMM  Neck: No JVD  Respiratory: CTAB, no wheezes, rales or rhonchi  Cardiovascular: S1, S2, RRR  Gastrointestinal: BS+, soft, NT/ND  Extremities: No cyanosis or clubbing. No peripheral edema  :  No hinds.   Skin: No rashes    LABS:            143  |  112<H>  |  41<H>  ----------------------------<  125<H>  4.4   |  19  |  4.0<H>    Ca    7.8<L>      2020 16:00    TPro  6.7  /  Alb  3.9  /  TBili  0.4  /  DBili      /  AST  20  /  ALT  19  /  AlkPhos  80  04-13                          15.7   9.18  )-----------( 239      ( 2020 06:00 )             45.9     Creatinine Trend: 4.0<--, 4.4<--, 4.2<--    Urine Studies:  Urinalysis Basic - ( 2020 22:50 )    Color: Yellow / Appearance: Clear / S.036 / pH:   Gluc:  / Ketone: Trace  / Bili: Negative / Urobili: <2 mg/dL   Blood:  / Protein: >600 mg/dL / Nitrite: Negative   Leuk Esterase: Negative / RBC: 3 /HPF / WBC 4 /HPF   Sq Epi:  / Non Sq Epi: 3 /HPF / Bacteria: Negative        RADIOLOGY & ADDITIONAL STUDIES:

## 2020-04-15 NOTE — CHART NOTE - NSCHARTNOTEFT_GEN_A_CORE
48 y/o man with pmhx of DMII not on medications, RAE not on CPAP and Atrial flutter not on anticoagulation presented from home for flu like symptoms with slurred speech and left sided facial droop X 3 days. Found to have acute stroke likely cardio-embolic not a TPA candidate due to delayed presentation. Hospital course complicated by hypertensive emergency, JORGE with severe left hydro. MRI suggestive of multifocal infarcts. F/U neuro on when to start anticoagulation with CHADVASC of 4, technically its been more than 72 hours of CVA so pt should be OK for full dose A/C. F/u transcranial duplex to r/o venous sinus thrombosis. F/U hypercoagulable panel. Pt would not benefit from INDY at this point as it will not change the management, pt has to be on anticoagulation regardless of INDY but will discuss with attending as neuro is requesting INDY. 2D echo did not show R-L shunt or LA thrombus. Pt has been in sinus rhythm throughout this admission but did have palpitations for few days prior to presentation.     IMPRESSION  CVA not a TPA candidate due to delayed presentation  Hypertensive emergency   Dyslipidemia   DM type II  Flu like symptoms (resolved) - COVID negative  JORGE on CKD?(no baseline available) with severe left sided hydro f/u Urology eval.   H/O Aflutter CHADVASC 4 was never on A/C, does not f/u with a cardiologist  RAE      PLAN:  c/w ASA and statin, Neuro checks q 4. Started on Plavix as per neuro.        Gentle hydration NS at 100 cc/hr;   c/w labetalol 200mg q 12, increase Nifedipine to 60 mg XL; lower BP by 25% QD.    History of atrial flutter now with CHADVASC of 4 start therapeutic anticoagulation once cleared by neuro.    JORGE on CKD?(no baseline available) with severe left sided hydro   f/u Urology eval.    Possible Rhabdo?  Strict Is and Os. F/u C3-C4, KENYON, ANCA and anti GBM antibodies. Nephrology following.     DVT prophylaxis Hep s/c. Start therapeutic anticoagulation for Atrial flutter with CHADVAC of 4 once cleared by neuro.    DM type II  Follow up FS.  Started Insulin protocol A1c 9.9.     CODE STATUS:  FULL CODE    DISPOSITION:  Downgrade to Stroke Unit, f/u PT/OT/Rehab eval. 48 y/o man with pmhx of DMII not on medications, RAE not on CPAP and Atrial flutter not on anticoagulation presented from home for flu like symptoms with slurred speech and left sided facial droop X 3 days. Found to have acute stroke likely cardio-embolic not a TPA candidate due to delayed presentation. Hospital course complicated by hypertensive emergency, JORGE with severe left hydro. MRI suggestive of multifocal infarcts. F/U neuro on when to start anticoagulation with CHADVASC of 4, technically its been more than 72 hours of CVA so pt should be OK for full dose A/C. F/u transcranial duplex to r/o venous sinus thrombosis. F/U hypercoagulable panel. Pt would not benefit from INDY at this point as it will not change the management, pt has to be on anticoagulation regardless of INDY but will discuss with attending as neuro is requesting INDY. 2D echo did not show R-L shunt or LA thrombus. Pt has been in sinus rhythm throughout this admission but did have palpitations for few days prior to presentation.     IMPRESSION  CVA not a TPA candidate due to delayed presentation  Hypertensive emergency   Dyslipidemia   DM type II  Flu like symptoms (resolved) - COVID negative  OJRGE on CKD?(no baseline available) with severe left sided hydro f/u Urology eval.   H/O Aflutter CHADVASC 4 was never on A/C, does not f/u with a cardiologist  RAE      PLAN:  c/w ASA and statin, Neuro checks q 4. Started on Plavix as per neuro.        c/w labetalol 200mg q 12, increase Nifedipine to 60 mg XL; lower BP by 25% QD.    History of atrial flutter now with CHADVASC of 4 start therapeutic anticoagulation once cleared by neuro.  F/u transcranial duplex to r/o venous sinus thrombosis.   F/U hypercoagulable panel.   Pt would not benefit from INDY at this point as it will not change the management, pt has to be on anticoagulation regardless of INDY, will discuss with attending    JORGE on CKD?(no baseline available) with severe left sided hydro   f/u Urology eval.    Possible Rhabdo?  Strict Is and Os. F/u C3-C4, KENYON, ANCA and anti GBM antibodies. Nephrology following.   Gentle hydration  cc/hr.     DVT prophylaxis Hep s/c. Start therapeutic anticoagulation for Atrial flutter with CHADVAC of 4 once cleared by neuro.    DM type II  Follow up FS.  Started Insulin protocol A1c 9.9.     CODE STATUS:  FULL CODE    DISPOSITION:  Downgrade to Stroke Unit, f/u PT/OT/Rehab eval. 50 y/o man with pmhx of DMII not on medications, RAE not on CPAP and Atrial flutter not on anticoagulation presented from home for flu like symptoms with slurred speech and left sided facial droop X 3 days. Found to have acute stroke likely cardio-embolic not a TPA candidate due to delayed presentation. Hospital course complicated by hypertensive emergency, JORGE with severe left hydro. MRI suggestive of multifocal infarcts. F/U neuro on when to start anticoagulation with CHADVASC of 4, technically its been more than 72 hours of CVA so pt should be OK for full dose A/C. F/u transcranial duplex. F/U hypercoagulable panel. Pt would not benefit from INDY at this point as it will not change the management, pt has to be on anticoagulation regardless of INDY but will discuss with attending as neuro is requesting INDY. 2D echo did not show R-L shunt or LA thrombus. Pt has been in sinus rhythm throughout this admission but did have palpitations for few days prior to presentation.     IMPRESSION  CVA not a TPA candidate due to delayed presentation  Hypertensive emergency   Dyslipidemia   DM type II  Flu like symptoms (resolved) - COVID negative  JORGE on CKD?(no baseline available) with severe left sided hydro f/u Urology eval.   H/O Aflutter CHADVASC 4 was never on A/C, does not f/u with a cardiologist  RAE      PLAN:  c/w ASA and statin, Neuro checks q 4. Started on Plavix as per neuro.        c/w labetalol 200mg q 12, increase Nifedipine to 60 mg XL; lower BP by 25% QD.    History of atrial flutter now with CHADVASC of 4 start therapeutic anticoagulation once cleared by neuro.  F/u transcranial duplex   F/U hypercoagulable panel.   Pt would not benefit from INDY at this point as it will not change the management, pt has to be on anticoagulation regardless of INDY, will discuss with attending    JORGE on CKD?(no baseline available) with severe left sided hydro   f/u Urology eval.    Possible Rhabdo?  Strict Is and Os. F/u C3-C4, KENYON, ANCA and anti GBM antibodies. Nephrology following.   Gentle hydration  cc/hr.     DVT prophylaxis Hep s/c. Start therapeutic anticoagulation for Atrial flutter with CHADVAC of 4 once cleared by neuro.    DM type II  Follow up FS.  Started Insulin protocol A1c 9.9.     CODE STATUS:  FULL CODE    DISPOSITION:  Downgrade to Stroke Unit, f/u PT/OT/Rehab eval.

## 2020-04-16 LAB
ALBUMIN SERPL ELPH-MCNC: 3.2 G/DL — LOW (ref 3.5–5.2)
ALP SERPL-CCNC: 65 U/L — SIGNIFICANT CHANGE UP (ref 30–115)
ALT FLD-CCNC: 13 U/L — SIGNIFICANT CHANGE UP (ref 0–41)
ANION GAP SERPL CALC-SCNC: 14 MMOL/L — SIGNIFICANT CHANGE UP (ref 7–14)
AST SERPL-CCNC: 18 U/L — SIGNIFICANT CHANGE UP (ref 0–41)
BASOPHILS # BLD AUTO: 0.08 K/UL — SIGNIFICANT CHANGE UP (ref 0–0.2)
BASOPHILS NFR BLD AUTO: 0.9 % — SIGNIFICANT CHANGE UP (ref 0–1)
BILIRUB SERPL-MCNC: 0.4 MG/DL — SIGNIFICANT CHANGE UP (ref 0.2–1.2)
BUN SERPL-MCNC: 48 MG/DL — HIGH (ref 10–20)
CALCIUM SERPL-MCNC: 8.8 MG/DL — SIGNIFICANT CHANGE UP (ref 8.5–10.1)
CHLORIDE SERPL-SCNC: 108 MMOL/L — SIGNIFICANT CHANGE UP (ref 98–110)
CO2 SERPL-SCNC: 19 MMOL/L — SIGNIFICANT CHANGE UP (ref 17–32)
CREAT SERPL-MCNC: 5.1 MG/DL — CRITICAL HIGH (ref 0.7–1.5)
EOSINOPHIL # BLD AUTO: 0.2 K/UL — SIGNIFICANT CHANGE UP (ref 0–0.7)
EOSINOPHIL NFR BLD AUTO: 2.2 % — SIGNIFICANT CHANGE UP (ref 0–8)
FERRITIN SERPL-MCNC: 739 NG/ML — HIGH (ref 30–400)
GLUCOSE BLDC GLUCOMTR-MCNC: 115 MG/DL — HIGH (ref 70–99)
GLUCOSE BLDC GLUCOMTR-MCNC: 145 MG/DL — HIGH (ref 70–99)
GLUCOSE BLDC GLUCOMTR-MCNC: 146 MG/DL — HIGH (ref 70–99)
GLUCOSE BLDC GLUCOMTR-MCNC: 71 MG/DL — SIGNIFICANT CHANGE UP (ref 70–99)
GLUCOSE BLDC GLUCOMTR-MCNC: 98 MG/DL — SIGNIFICANT CHANGE UP (ref 70–99)
GLUCOSE SERPL-MCNC: 105 MG/DL — HIGH (ref 70–99)
HCT VFR BLD CALC: 41.4 % — LOW (ref 42–52)
HCYS SERPL-MCNC: 18.6 UMOL/L — HIGH
HGB BLD-MCNC: 14.3 G/DL — SIGNIFICANT CHANGE UP (ref 14–18)
IMM GRANULOCYTES NFR BLD AUTO: 0.3 % — SIGNIFICANT CHANGE UP (ref 0.1–0.3)
LYMPHOCYTES # BLD AUTO: 2.02 K/UL — SIGNIFICANT CHANGE UP (ref 1.2–3.4)
LYMPHOCYTES # BLD AUTO: 22.1 % — SIGNIFICANT CHANGE UP (ref 20.5–51.1)
MAGNESIUM SERPL-MCNC: 1.9 MG/DL — SIGNIFICANT CHANGE UP (ref 1.8–2.4)
MCHC RBC-ENTMCNC: 29 PG — SIGNIFICANT CHANGE UP (ref 27–31)
MCHC RBC-ENTMCNC: 34.5 G/DL — SIGNIFICANT CHANGE UP (ref 32–37)
MCV RBC AUTO: 84 FL — SIGNIFICANT CHANGE UP (ref 80–94)
MONOCYTES # BLD AUTO: 0.72 K/UL — HIGH (ref 0.1–0.6)
MONOCYTES NFR BLD AUTO: 7.9 % — SIGNIFICANT CHANGE UP (ref 1.7–9.3)
NEUTROPHILS # BLD AUTO: 6.08 K/UL — SIGNIFICANT CHANGE UP (ref 1.4–6.5)
NEUTROPHILS NFR BLD AUTO: 66.6 % — SIGNIFICANT CHANGE UP (ref 42.2–75.2)
NRBC # BLD: 0 /100 WBCS — SIGNIFICANT CHANGE UP (ref 0–0)
PLATELET # BLD AUTO: 231 K/UL — SIGNIFICANT CHANGE UP (ref 130–400)
POTASSIUM SERPL-MCNC: 4.7 MMOL/L — SIGNIFICANT CHANGE UP (ref 3.5–5)
POTASSIUM SERPL-SCNC: 4.7 MMOL/L — SIGNIFICANT CHANGE UP (ref 3.5–5)
PROT C ACT/NOR PPP: 118 % — SIGNIFICANT CHANGE UP (ref 65–129)
PROT SERPL-MCNC: 5.3 G/DL — LOW (ref 6–8)
RBC # BLD: 4.93 M/UL — SIGNIFICANT CHANGE UP (ref 4.7–6.1)
RBC # FLD: 12.8 % — SIGNIFICANT CHANGE UP (ref 11.5–14.5)
SODIUM SERPL-SCNC: 141 MMOL/L — SIGNIFICANT CHANGE UP (ref 135–146)
WBC # BLD: 9.13 K/UL — SIGNIFICANT CHANGE UP (ref 4.8–10.8)
WBC # FLD AUTO: 9.13 K/UL — SIGNIFICANT CHANGE UP (ref 4.8–10.8)

## 2020-04-16 PROCEDURE — 99232 SBSQ HOSP IP/OBS MODERATE 35: CPT

## 2020-04-16 PROCEDURE — 71045 X-RAY EXAM CHEST 1 VIEW: CPT | Mod: 26

## 2020-04-16 PROCEDURE — 70450 CT HEAD/BRAIN W/O DYE: CPT | Mod: 26

## 2020-04-16 RX ORDER — SENNA PLUS 8.6 MG/1
1 TABLET ORAL DAILY
Refills: 0 | Status: DISCONTINUED | OUTPATIENT
Start: 2020-04-16 | End: 2020-04-22

## 2020-04-16 RX ORDER — SODIUM CHLORIDE 9 MG/ML
500 INJECTION INTRAMUSCULAR; INTRAVENOUS; SUBCUTANEOUS ONCE
Refills: 0 | Status: COMPLETED | OUTPATIENT
Start: 2020-04-16 | End: 2020-04-16

## 2020-04-16 RX ORDER — LISINOPRIL 2.5 MG/1
5 TABLET ORAL DAILY
Refills: 0 | Status: DISCONTINUED | OUTPATIENT
Start: 2020-04-16 | End: 2020-04-17

## 2020-04-16 RX ORDER — PIOGLITAZONE HYDROCHLORIDE 15 MG/1
30 TABLET ORAL DAILY
Refills: 0 | Status: DISCONTINUED | OUTPATIENT
Start: 2020-04-16 | End: 2020-04-17

## 2020-04-16 RX ADMIN — HEPARIN SODIUM 5000 UNIT(S): 5000 INJECTION INTRAVENOUS; SUBCUTANEOUS at 21:05

## 2020-04-16 RX ADMIN — ATORVASTATIN CALCIUM 80 MILLIGRAM(S): 80 TABLET, FILM COATED ORAL at 21:05

## 2020-04-16 RX ADMIN — Medication 162 MILLIGRAM(S): at 11:46

## 2020-04-16 RX ADMIN — PANTOPRAZOLE SODIUM 40 MILLIGRAM(S): 20 TABLET, DELAYED RELEASE ORAL at 05:58

## 2020-04-16 RX ADMIN — HEPARIN SODIUM 5000 UNIT(S): 5000 INJECTION INTRAVENOUS; SUBCUTANEOUS at 05:58

## 2020-04-16 RX ADMIN — SODIUM CHLORIDE 1000 MILLILITER(S): 9 INJECTION INTRAMUSCULAR; INTRAVENOUS; SUBCUTANEOUS at 11:32

## 2020-04-16 RX ADMIN — CLOPIDOGREL BISULFATE 75 MILLIGRAM(S): 75 TABLET, FILM COATED ORAL at 11:46

## 2020-04-16 RX ADMIN — SODIUM CHLORIDE 1000 MILLILITER(S): 9 INJECTION INTRAMUSCULAR; INTRAVENOUS; SUBCUTANEOUS at 10:41

## 2020-04-16 RX ADMIN — Medication 7 UNIT(S): at 11:39

## 2020-04-16 RX ADMIN — Medication 60 MILLIGRAM(S): at 05:58

## 2020-04-16 NOTE — PHYSICAL THERAPY INITIAL EVALUATION ADULT - GENERAL OBSERVATIONS, REHAB EVAL
Pt presents with good b/l MMT, WFL t/o and normal AROM. Pt just came back from MRI. Pt noted with high bp, 204/104. Taken again by 191/106. Will hold b/s PT at this time. Explained to pt who is in full agreement. MD arrived to assess pt.
Pt rec'd in bed HOB inclined, awake, pleasant and cooperative, c/o L sided weakness.  Pt left sitting in b/s chair in NAD w/IV fluids being administered by RN, Tele monitor disconnected for therapy and reconnected afterward.

## 2020-04-16 NOTE — PROGRESS NOTE ADULT - SUBJECTIVE AND OBJECTIVE BOX
Neurocritical Care Progress Note:    1. Brief Presentation: He reports of improved left sided weakness, continues to have dysarthria and left facial. His BP still elevated, but titrating down gradually.     2. Today's Acute Problems: Left sided weakness, left facial droop, and + dysarthria    3. 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 Of note, he also reports of body aches "as if I had the flu," + chills, abdominal discomfort without N/V. He denies chest pain, cough, HA or dysuria. In ED, he was found to have /128, initial NIHSS 4. Initial CT head negative for intracranial bleed and no evidence of stroke. Patient was started on nicardipine drip but it was subsequently d/rodger per neurology request due to worsening left sided weakness and for sudden drop in SBP to ~170s. He was also found to have elevated creatinine at 4 (no baseline).       4-Yesterday's Plan:  SUGGESTIONS:  - Routine Neuro checks and Continuous telemonitoring   - Can consider transfer to Stroke unit under Dr. Valencia as admitting physician, if no further ICU management AND if confirmed COVID suspicion is negative. Recommend obtaining serum ferritin, LDH, ESR, CRP, fibrinogen, D-dimer, CK, CBC with diff  - c/w  mg daily and start Plavix 75 mg daily, and high dose statin  - Start Lisinopril 10 mg PO daily, will re-evaluate need for nifedipine and labetalol  - Notify our team if SBP > 200   - INDY with Bubble Study  - Transcranial doppler with contrast for evaluation of shunt tomorrow  - Obtain hypercoagulable work up   - Consult endocrinology for DM evaluation and management  - Follow up with nephrology and urology team's recommendation in regards to severe left hydronephrosis  - Maintain strict glycemic control  - Consult PT/OT/Speech and swallow for evaluation and management    5. Last 24 hour updates: BP dropped to 120's overnight and patient noted recurrence of L hand uncoordination. Patient received bolus of  mL x2, BP returned to 159/86 and patient reported feeling better.    6. Medications:   aspirin enteric coated 162 milliGRAM(s) Oral daily  atorvastatin 80 milliGRAM(s) Oral at bedtime  chlorhexidine 4% Liquid 1 Application(s) Topical <User Schedule>  clopidogrel Tablet 75 milliGRAM(s) Oral daily  dextrose 5%. 1000 milliLiter(s) IV Continuous <Continuous>  dextrose 50% Injectable 12.5 Gram(s) IV Push once  dextrose 50% Injectable 25 Gram(s) IV Push once  dextrose 50% Injectable 25 Gram(s) IV Push once  heparin  Injectable 5000 Unit(s) SubCutaneous every 8 hours  labetalol 200 milliGRAM(s) Oral every 12 hours  pantoprazole    Tablet 40 milliGRAM(s) Oral before breakfast  pioglitazone 30 milliGRAM(s) Oral daily      Chest PT[ ]   Head of bed >35 [ ]   Out of bed to chair [X ]   PT/OT/SP Eval [ X]   Spirometry[ ]   DVT prophylaxis[ ]    8.Neuro:   Mental status: Awake, alert and oriented x 4. Attention and concentration intact. Fund of knowledge appropriate.  Language: Following commands. Naming, repetition, fluency, and comprehension intact. + Dysarthria, no aphasia.  Cranial nerves: B/l pupils equally round and reactive to light to 4->3 mm, visual fields full, no nystagmus, extraocular muscles intact, V1 through V3 intact bilaterally and symmetric, left facial droop, hearing intact to finger rub, palate elevation symmetric, tongue was midline, sternocleidomastoid/shoulder shrug strength bilaterally 5/5.    Motor: Normal bulk and tone, strength 5/5 in bilateral upper and lower extremities.   strength 4/5 in left hand. No drift in all extremities.   Sensation: Intact to light touch, proprioception, and pinprick.  No neglect.   Coordination: No dysmetria on finger-to-nose and heel-to-shin.     NIH STROKE SCALE  Item	                                                        Score  1 a.	Level of Consciousness	               	0  1 b. LOC Questions	                                    0  1 c.	LOC Commands	                               	0  2.	Best Gaze	                                    0  3.	Visual	                                                0  4.	Facial Palsy	                                    2  5 a.	Motor Arm - Left	                        0  5 b.	Motor Arm - Right	                        0  6 a.	Motor Leg - Left	                        0  6 b.	Motor Leg - Right	                        0  7.	Limb Ataxia	                                    0  8.	Sensory	                                                0  9.	Language	                                    0  10.	Dysarthria	                                    1  11.	Extinction and Inattention  	            0  ________________________________________________________________________  TOTAL	                                                            4->3    mRS: baseline 0 ->1 No significant disability despite symptoms; able to carry out all usual duties and activities without assistance    Last CTH:  < from: CT Head No Cont (20 @ 08:41) >  IMPRESSION:   1. No midline shift, mass effect, or acute intracranial hemorrhage.    2. redemonstration of focal infarcts in bilateral basal ganglia and frontal corona radiata. Refer to MRI brain dated 4/15/2020 for full description of findings.    < end of copied text >  < from: CT Head No Cont (20 @ 13:14) >  IMPRESSION:   In comparison with the prior noncontrast CT scan of the brain dated 2020 time 10:27 AM:  Stable examination.  MRI of the brain is suggested for further evaluation not contraindicated in this patient.    < from: CT Brain Stroke Protocol (20 @ 10:35) >  IMPRESSION:  1.  No acute hemorrhage  2.  No acute territorial infarct  3.  Bilateral relatively symmetric diminished density within the white matter tracts of the corona radiata extending to external capsules. The appearance suggests ischemic change, but does not appear acute    Last CTA/CTP:  < from: CT Perfusion w/ Maps w/ IV Cont (20 @ 11:00) >  FINDINGS:  Aortic arch: There is a normal branching pattern. The origins of the great vessels are normal. The subclavian arteries are unremarkable.  Right carotid: The common carotid artery is normal. The bifurcation is normally patent. The cervical internal and external carotid arteries are normal.  Left carotid: The common carotid artery is unremarkable. The bifurcation is patent. The cervical internal and external carotid arteries are normal.    Anterior intracranial circulation:    Right ICA: The petrous, cavernous and supraclinoid portions of the ICA are normal. The ophthalmic artery is patent. There is a prominent posterior communicating supply to the posterior cerebral artery with hypoplastic P1. The M1, bifurcation and remaining branches of the middle cerebral artery are patent. The anterior cerebral arteryA1 segment is mildly hypoplastic.    Left ICA: The petrous, cavernous and supraclinoid portions of the ICA are patent. There may be mild narrowing of the para ophthalmic ICA. The ophthalmic artery and small posterior communicating arteries are patent. The middle cerebral artery, trifurcation and branches are patent. The anterior cerebral arteries patent.    The region of the anterior to indicating arteries normal.    Vertebral basilar vessels: The vertebral arteries are patent in the neck. The left vertebral artery is dominant. The right vertebral artery is small and terminates in a posterior inferior cerebellar artery. The left PICA is patent. The basilar artery, superior cerebellar and posterior cerebral arteries are patent.    The visualized brain parenchyma was evaluated on the routine head CT performed at same time.    Other findings: The submandibular and parotid glands are unremarkable. The thyroid gland is unremarkable.    The visualized upper aerodigestive tract is normal.    Therapid perfusion maps do not demonstrate evidence for brain ischemia using the Tmax > 6 sec and CBF < 30% mismatch volume criteria.    Images to the upper thorax demonstrates that the visualized pleural parenchyma and mediastinum are normal.    IMPRESSION:  1.  Patent carotid bifurcations.  2.  No large vessel occlusion  3.  No evidence for ischemia using rapid perfusion maps and the Tmax > 6 sec and CBF < 30% mismatch volume criteria.    Last MRI:   < from: MR Head No Cont (04.15.20 @ 09:36) >  FINDINGS:   There is restricted diffusion with corresponding T2/FLAIR hyperintense signal in the right basal ganglia extending to the right frontal corona radiata compatible with infarct.There are also additional foci of restricted diffusion in the right external capsule, in the right periatrial white matter, compatible with lacunar infarcts. There are foci of high signal in the left corona radiata on the B 1000 sequence with corresponding T2 and FLAIR hyperintense signal, but no dark signal on the ADC map, compatible with subacute infarcts. There are chronic right gangliocapsular infarcts. Scattered periventricular punctate T2 FLAIR hyperintensities consistent with microangiopathy.    Ventricles and sulci are normal in size and configuration. No extra axial collection. No mass effect or midline shift.  No evidence of hemorrhage on the gradient echo sequence. The major vessel flow voids are preserved at the skull base.  Cerebellar tonsils are in normal location.      No gross orbital mass. Mild mucosal thickening in bilateral ethmoid air cells and right frontal and left maxillary sinuses. Mastoid air cells are normal in signal. Bone marrow signal is unremarkable.    IMPRESSION: Bilateral focal/lacunar acute and subacute infarcts as detailed above compatible with cardioembolic infarcts. No hemorrhagic transformation.    Last TCD: NONE    Last EEG: NONE      9. Cardiovascular:   Vital Signs Last 24 Hrs  T(C): 36.3 (2020 12:13), Max: 36.3 (2020 12:13)  T(F): 97.4 (2020 12:13), Max: 97.4 (2020 12:13)  HR: 89 (2020 12:13) (75 - 91)  BP: 159/86 (2020 12:13) (122/62 - 195/96)  BP(mean): --  RR: 17 (2020 12:13) (16 - 18)  SpO2: 99% (2020 12:13) (95% - 100%)     Last Echo:   < from: Transthoracic Echocardiogram (04.15.20 @ 09:54) >  EXAM:  2-D ECHO (TTE) COMPLETE    PROCEDURE DATE:  04/15/2020      INTERPRETATION:  REPORT:    TRANSTHORACIC ECHOCARDIOGRAM REPORT      Patient Name:   TARUN DEL TORO Accession #: [id number]  Medical Rec #:  [id number]       Height:      62.0 in 157.5 cm  YOB: 1970      Weight:      68.0 lb 30.84 kg  Patient Age:    49 years       BSA:         1.21 m²  Patient Gender: M              BP:          190/98 mmHg       Date of Exam:        4/15/2020 9:54:22 AM  Referring Physician: [id number] ED UNASSIGNED  Sonographer:         Adela Coe  Reading Physician:   Gus Butler M.D.    Procedure:   2D Echo/Doppler/Color Doppler Complete and Saline Contrast (Bubble               Study).  Indications: I69.998 - Other Sequelae following unspecified Cerebrovascular D  Diagnosis:   Other forms of dyspnea - R06.09    Summary:   1. LV Ejection Fraction by Ortiz's Method with a biplane EF of 57 %.   2. Moderately increased LV wall thickness.   3. Spectral Doppler shows impaired relaxation pattern of left ventricular myocardial filling (Grade I diastolic dysfunction).   4. Mild mitral valve regurgitation.   5. Color flow doppler and intravenous injection of agitated saline demonstrates the presence of an intact intra atrial septum.    PHYSICIAN INTERPRETATION:  Left Ventricle: The left ventricular internal cavity size is normal. Left ventricular wall thickness is moderately increased. Spectral Doppler shows impaired relaxation pattern of left ventricular myocardial filling (Grade I diastolic dysfunction).  Right Ventricle: Normal right ventricular size and function.  Left Atrium: Mildly enlarged left atrium. Color flow doppler and intravenous injection of agitated saline demonstrates the presence of an intact intra atrial septum.  Right Atrium: Normal right atrial size.  Pericardium: There is no evidence of pericardial effusion.  Mitral Valve: No evidence of mitral stenosis. Mild mitral valve regurgitation is seen.  Tricuspid Valve: Mild tricuspid regurgitation isvisualized.  Aortic Valve: No evidence of aortic stenosis. Aortic valve thickening with normal leaflet opening. Trivial aortic valve regurgitation is seen.  Pulmonic Valve: Mild pulmonic valve regurgitation.  Aorta: The aortic root is normal in size and structure.  Venous: The inferior vena cava was normal sized, with respiratory size variation greater than 50%.  Shunts: Agitated saline contrast was given intravenously to evaluate for intracardiac shunting.       2D AND M-MODE MEASUREMENTS (normal ranges within parentheses):  Left                  Normal   Aorta/Left             Normal  Ventricle:                     Atrium:  IVSd (2D):  1.43 cm  (0.7-1.1) AoV Cusp       1.87  (1.5-2.6)  LVPWd (2D): 1.38 cm  (0.7-1.1) Separation:     cm  LVIDd (2D): 4.12 cm  (3.4-5.7) Left Atrium    3.86  (1.9-4.0)  LVIDs (2D): 2.77 cm            (Mmode):        cm  LV FS (2D):  32.8 %   (>25%)   LA Volume      34.3  IVSd        1.48 cm  (0.7-1.1) Index         ml/m²  (Mmode):                       Right  LVPWd       1.08 cm  (0.7-1.1) Ventricle:  (Mmode):                       RVd (Mmode):   1.44 cm  LVIDd       4.48 cm  (3.4-5.7) RVd (2D):      2.31 cm  (Mmode):  LVIDs       3.20 cm  (Mmode):  LV FS        28.6 %   (>25%)  (Mmode):  Relative      0.67    (<0.42)  Wall  Thickness  Rel. Wall     0.48    (<0.42)  Thickness  Mm  LV Mass      179.4  Index:        g/m²  Mmode    SPECTRAL DOPPLER ANALYSIS:  LV DIASTOLIC FUNCTION:  MV Peak E: 0.51 m/s Decel Time: 247 msec  MV Peak A: 0.79 m/s  E/A Ratio: 0.64    LVOT Vmax: 0.65 m/s  LVOT VTI:  0.15 m  LVOT Diam: 2.27 cm    Mitral Valve:  MV P1/2 Time: 71.55 msec  MV Area, PHT: 3.07 cm²    Tricuspid Valve and PA/RV Systolic Pressure: TR Max Velocity: 0.92 m/s RA Pressure: 3 mmHg RVSP/PASP: 6.4 mmHg    Pulmonic Valve:  PV Max Velocity: 1.06 m/s PV Max P.5 mmHg PV Mean PG:    Last EKG:  < from: 12 Lead ECG (20 @ 16:59) >  Ventricular Rate 83 BPM    Atrial Rate 83 BPM    P-R Interval 174 ms    QRS Duration 90 ms    Q-T Interval 382 ms    QTC Calculation(Bezet) 448 ms    P Axis 40 degrees    R Axis 79 degrees    T Axis 27 degrees    Diagnosis Line Normal sinus rhythm  Normal ECG    < end of copied text >      SBP target: >140  CO:      CI:       Enzymes/Trop:    10. Respiratory:     Chest Xray:  < from: Xray Chest 1 View- PORTABLE-Routine (20 @ 10:45) >  IMPRESSION:      No radiographic evidence of acute cardiopulmonary disease.    < end of copied text >        11.GI:    Prophyaxis: Protonix 40 mg daily  Bowel mvt: yes    Abd distension: No  LIVER FUNCTIONS - ( 2020 05:26 )  Alb: 3.2 g/dL / Pro: 5.3 g/dL / ALK PHOS: 65 U/L / ALT: 13 U/L / AST: 18 U/L / GGT: x             12.Renal/Fluids/Electrolytes:        141  |  108  |  48<H>  ----------------------------<  105<H>  4.7   |  19  |  5.1<HH>    Ca    8.8      2020 05:26  Mg     1.9         TPro  5.3<L>  /  Alb  3.2<L>  /  TBili  0.4  /  DBili  x   /  AST  18  /  ALT  13  /  AlkPhos  65        13.ID:   TMax:   Vital Signs Last 24 Hrs  T(C): 36.3 (2020 12:13), Max: 36.3 (2020 12:13)  T(F): 97.4 (2020 12:13), Max: 97.4 (2020 12:13)  HR: 89 (2020 12:13) (75 - 91)  BP: 159/86 (2020 12:13) (122/62 - 195/96)  BP(mean): --  RR: 17 (2020 12:13) (16 - 18)  SpO2: 99% (2020 12:13) (95% - 100%)           Lines: Central[] Date inserted: Peripheral[x]    14. Hematology:                         14.3   9.13  )-----------( 231      ( 2020 05:26 )             41.4      04-16    141  |  108  |  48<H>  ----------------------------<  105<H>  4.7   |  19  |  5.1<HH>    Ca    8.8      2020 05:26  Mg     1.9     04-16    TPro  5.3<L>  /  Alb  3.2<L>  /  TBili  0.4  /  DBili  x   /  AST  18  /  ALT  13  /  AlkPhos  65  04-16         DVT Prophylaxis Lovenox[ ] Heparin[ x ] Venodynes[ ] SCD's[ ]

## 2020-04-16 NOTE — PHYSICAL THERAPY INITIAL EVALUATION ADULT - GAIT DEVIATIONS NOTED, PT EVAL
decreased step length/decreased velocity of limb motion/increased stride width/increased time in double stance/decreased stride length

## 2020-04-16 NOTE — PROGRESS NOTE ADULT - ASSESSMENT
15. Impression:  A 49 years old right handed man with uncontrolled DM (A1C 9.2), DLD, RAE, Aflutter who presents with slurred speech, left sided facial droop, and left sided hemiparesis, body aches and chills. In ED NIHSS 4, CTH was negative for hemorrhage, patient not tPA candidate as outside window. Patient with hypertensive urgency 220/110 and was started on Cardene drip but discontinued per Neuro d/t worsening left sided weakness. On brain MRI he is found to have acute right lacunar infarcts in R basal ganglia, L subacute lacunar infarct, R posterior commissure infarct, corresponding to both anterior and posterior circulation.   CT abd/pelvis showed Left UPJ obstruction of unclear chronicity - atrophic left kidney with severe hydronephrosis to the UPJ. No obstructions.  Of note he is with history of Aflutter and risk factors including DM, HTN, cannot exclude possible etiology of atypical embolic source and therefore further work up is warranted. Of note, patient had COVID PCR negative, remains afebrile but he is with persistent cough.     SUGGESTIONS:  - f/u serum ferritin, LDH, ESR, CRP, fibrinogen, D-dimer, CK, CBC with diff  - c/w  mg daily and start Plavix 75 mg daily, and high dose statin  - Decrease Lisinopril to 5mg PO daily  - Continue labetalol 200mg bid  - d/c nifedipine  - Notify neuro team if SBP <140  - f/u hypercoagulable work up   - Per endocrinology, may d/c insulin and start pioglitazone 30mg po daily, if insufficient may add sitagliptin 25mg po daily   - Per urology, may f/u outpatient for renal workup of left hydronephrosis  - renal diet  - CPAP at night for RAE  - Maintain strict glycemic control    17. Disposition:  -to home pending medical stabilization 15. Impression:  A 49 years old right handed man with uncontrolled DM (A1C 9.2), DLD, RAE, Aflutter who presents with slurred speech, left sided facial droop, and left sided hemiparesis, body aches and chills. In ED NIHSS 4, CTH was negative for hemorrhage, patient not tPA candidate as outside window. Patient with hypertensive urgency 220/110 and was started on Cardene drip but discontinued per Neuro d/t worsening left sided weakness. On brain MRI he is found to have acute right lacunar infarcts in R basal ganglia, L subacute lacunar infarct, R posterior commissure infarct, corresponding to both anterior and posterior circulation.   CT abd/pelvis showed Left UPJ obstruction of unclear chronicity - atrophic left kidney with severe hydronephrosis to the UPJ. No obstructions.  Of note he is with history of Aflutter and risk factors including DM, HTN, cannot exclude possible etiology of atypical embolic source and therefore further work up is warranted. Of note, patient had COVID PCR negative, remains afebrile but he is with persistent cough.     SUGGESTIONS:  - f/u serum ferritin, LDH, ESR, CRP, fibrinogen, D-dimer, CK, CBC with diff  - c/w  mg daily and start Plavix 75 mg daily, and high dose statin  - Decrease Lisinopril to 5mg PO daily  - Continue labetalol 200mg bid  - d/c nifedipine  - f/u renal artery U/S  - Notify neuro team if SBP <140  - f/u hypercoagulable work up   - Per endocrinology, may d/c insulin and start pioglitazone 30mg po daily, if insufficient may add sitagliptin 25mg po daily   - Per urology, may f/u outpatient for renal workup of left hydronephrosis  - renal diet  - CPAP at night for RAE  - Maintain strict glycemic control    17. Disposition:  -to home pending medical stabilization

## 2020-04-16 NOTE — PROGRESS NOTE ADULT - SUBJECTIVE AND OBJECTIVE BOX
Nephrology progress note    THIS IS AN INCOMPLETE NOTE . FULL NOTE TO FOLLOW SHORTLY    Patient is seen and examined, events over the last 24 h noted .    Allergies:  Allergy Status Unknown    Hospital Medications:   MEDICATIONS  (STANDING):  aspirin enteric coated 162 milliGRAM(s) Oral daily  atorvastatin 80 milliGRAM(s) Oral at bedtime  chlorhexidine 4% Liquid 1 Application(s) Topical <User Schedule>  clopidogrel Tablet 75 milliGRAM(s) Oral daily  dextrose 5%. 1000 milliLiter(s) (50 mL/Hr) IV Continuous <Continuous>  dextrose 50% Injectable 12.5 Gram(s) IV Push once  dextrose 50% Injectable 25 Gram(s) IV Push once  dextrose 50% Injectable 25 Gram(s) IV Push once  heparin  Injectable 5000 Unit(s) SubCutaneous every 8 hours  insulin glargine Injectable (LANTUS) 21 Unit(s) SubCutaneous at bedtime  insulin lispro (HumaLOG) corrective regimen sliding scale   SubCutaneous three times a day before meals  insulin lispro Injectable (HumaLOG) 7 Unit(s) SubCutaneous three times a day before meals  labetalol 200 milliGRAM(s) Oral every 12 hours  lisinopril 10 milliGRAM(s) Oral daily  NIFEdipine XL 60 milliGRAM(s) Oral daily  pantoprazole    Tablet 40 milliGRAM(s) Oral before breakfast        VITALS:  T(F): 97.2 (20 @ 08:27), Max: 97.2 (20 @ 08:27)  HR: 84 (20 @ 08:27)  BP: 136/76 (20 @ 08:27)  RR: 16 (20 @ 08:27)  SpO2: 100% (20 @ 08:27)  Wt(kg): --     @ 07:01  -  04-15 @ 07:00  --------------------------------------------------------  IN: 200 mL / OUT: 0 mL / NET: 200 mL      Height (cm): 172.7 (04-15 @ 16:30)  Weight (kg): 112.1 (04-15 @ 16:30)  BMI (kg/m2): 37.6 (04-15 @ 16:30)  BSA (m2): 2.24 (04-15 @ 16:30)    PHYSICAL EXAM:  Constitutional: NAD  HEENT: anicteric sclera, oropharynx clear, MMM  Neck: No JVD  Respiratory: CTAB, no wheezes, rales or rhonchi  Cardiovascular: S1, S2, RRR  Gastrointestinal: BS+, soft, NT/ND  Extremities: No cyanosis or clubbing. No peripheral edema  :  No hinds.   Skin: No rashes    LABS:      141  |  108  |  48<H>  ----------------------------<  105<H>  4.7   |  19  |  5.1<HH>    Ca    8.8      2020 05:26  Mg     1.9         TPro  5.3<L>  /  Alb  3.2<L>  /  TBili  0.4  /  DBili      /  AST  18  /  ALT  13  /  AlkPhos  65                            14.3   9.13  )-----------( 231      ( 2020 05:26 )             41.4       Urine Studies:  Urinalysis Basic - ( 2020 22:50 )    Color: Yellow / Appearance: Clear / S.036 / pH:   Gluc:  / Ketone: Trace  / Bili: Negative / Urobili: <2 mg/dL   Blood:  / Protein: >600 mg/dL / Nitrite: Negative   Leuk Esterase: Negative / RBC: 3 /HPF / WBC 4 /HPF   Sq Epi:  / Non Sq Epi: 3 /HPF / Bacteria: Negative      Sodium, Random Urine: 82.0 mmoL/L (04-15 @ 11:40)  Creatinine, Random Urine: 148 mg/dL (04-15 @ 11:40)  Chloride, Random Urine: 79 (04-15 @ 11:40)  Potassium, Random Urine: 58 mmol/L (04-15 @ 11:40)    RADIOLOGY & ADDITIONAL STUDIES: Nephrology progress note  Patient is seen and examined, events over the last 24 h noted .  lying in chair comfortable     Allergies:  Allergy Status Unknown    Hospital Medications:   MEDICATIONS  (STANDING):  aspirin enteric coated 162 milliGRAM(s) Oral daily  atorvastatin 80 milliGRAM(s) Oral at bedtime  chlorhexidine 4% Liquid 1 Application(s) Topical <User Schedule>  clopidogrel Tablet 75 milliGRAM(s) Oral daily  dextrose 5%. 1000 milliLiter(s) (50 mL/Hr) IV Continuous <Continuous>  heparin  Injectable 5000 Unit(s) SubCutaneous every 8 hours  insulin glargine Injectable (LANTUS) 21 Unit(s) SubCutaneous at bedtime  insulin lispro (HumaLOG) corrective regimen sliding scale   SubCutaneous three times a day before meals  insulin lispro Injectable (HumaLOG) 7 Unit(s) SubCutaneous three times a day before meals  labetalol 200 milliGRAM(s) Oral every 12 hours  lisinopril 10 milliGRAM(s) Oral daily  NIFEdipine XL 60 milliGRAM(s) Oral daily  pantoprazole    Tablet 40 milliGRAM(s) Oral before breakfast        VITALS:  T(F): 97.2 (20 @ 08:27), Max: 97.2 (20 @ 08:27)  HR: 84 (20 @ 08:27)  BP: 136/76 (20 @ 08:27)  RR: 16 (20 @ 08:27)  SpO2: 100% (20 @ 08:27)      14 @ 07:01  -  04-15 @ 07:00  --------------------------------------------------------  IN: 200 mL / OUT: 0 mL / NET: 200 mL      Height (cm): 172.7 (04-15 @ 16:30)  Weight (kg): 112.1 (04-15 @ 16:30)  BMI (kg/m2): 37.6 (04-15 @ 16:30)  BSA (m2): 2.24 (04-15 @ 16:30)    PHYSICAL EXAM:  Constitutional: NAD  HEENT: anicteric sclera, oropharynx clear, MMM  Neck: No JVD  Respiratory: CTAB, no wheezes, rales or rhonchi  Cardiovascular: S1, S2, RRR  Gastrointestinal: BS+, soft, NT/ND  Extremities: No cyanosis or clubbing. No peripheral edema  :  No hinds.   Skin: No rashes    LABS:      141  |  108  |  48<H>  ----------------------------<  105<H>  4.7   |  19  |  5.1<HH>    Creatinine Trend: 5.1<--, 4.0<--, 4.4<--, 4.2<--    Ca    8.8      2020 05:26  Mg     1.9         TPro  5.3<L>  /  Alb  3.2<L>  /  TBili  0.4  /  DBili      /  AST  18  /  ALT  13  /  AlkPhos  65                            14.3   9.13  )-----------( 231      ( 2020 05:26 )             41.4       Urine Studies:  Urinalysis Basic - ( 2020 22:50 )    Color: Yellow / Appearance: Clear / S.036 / pH:   Gluc:  / Ketone: Trace  / Bili: Negative / Urobili: <2 mg/dL   Blood:  / Protein: >600 mg/dL / Nitrite: Negative   Leuk Esterase: Negative / RBC: 3 /HPF / WBC 4 /HPF   Sq Epi:  / Non Sq Epi: 3 /HPF / Bacteria: Negative      Sodium, Random Urine: 82.0 mmoL/L (04-15 @ 11:40)  Creatinine, Random Urine: 148 mg/dL (04-15 @ 11:40)  Chloride, Random Urine: 79 (04-15 @ 11:40)  Potassium, Random Urine: 58 mmol/L (04-15 @ 11:40)    RADIOLOGY & ADDITIONAL STUDIES:  < from: CT Abdomen and Pelvis No Cont (04.15.20 @ 13:48) >  1.  Left UPJ obstruction of unclear chronicity - atrophic left kidney with severe hydronephrosis to the UPJ. No obstructing calculus.   2.  Nonobstructing bilateral renal calculi.  3.  Vicarious excretion of contrast in this patient with history of acute kidney injury.    < end of copied text >  < from: CT Abdomen and Pelvis No Cont (04.15.20 @ 13:48) >  KIDNEYS: Atrophic left kidney. Severe left-sided hydronephrosis to the level of the ureteropelvic junction, where there is abrupt transition to normal caliber ureter. No evidence of obstructing stone. No right hydronephrosis. Bilateral nonobstructing renal calculi measuring up to 5 mm in the left lower pole.    < end of copied text >

## 2020-04-16 NOTE — OCCUPATIONAL THERAPY INITIAL EVALUATION ADULT - PERTINENT HX OF CURRENT PROBLEM, REHAB EVAL
Pt is a 48 y/o right handed man admitted with slurred speech, left sided facial droop, and left sided hemiparesis, body aches and chills. MRI showed acute right lacunar infarcts in both anterior and posterior circulation. Of note he is with history of Aflutter and risk factors including DM, HTN, cannot exclude possible etiology of atypical embolic source and therefore further work up is warranted. Of note, patient had COVID PCR negative, remains afebrile but he is with persistent cough.

## 2020-04-16 NOTE — OCCUPATIONAL THERAPY INITIAL EVALUATION ADULT - GENERAL OBSERVATIONS, REHAB EVAL
Pt seen 8:55-9:30,  Pt received semi kay in bed in NAD. Pt agreeable to OT sami. Pt seen 8:55-9:30, 11:00-11:10 Pt received semi kay in bed in NAD. Pt agreeable to OT sami.

## 2020-04-16 NOTE — PROGRESS NOTE ADULT - ASSESSMENT
48 y/o M with JORGE in hospital for slurred speech, left side facial droop, SOB, chills but afebrile, body aches.  PMH DM, RAE, Proteinuria    # JORGE: no baseline creatinine available. last OP labs ~ 5 years ago. poor compliance with meds./ most likely JORGE on CKD obstructive in nature and progressing over time as evidenced by CT findings    - ? etiology most likely chronic left obstruction with ATN now    - serum creatinine worse   - appreciate urology eval / agree with lasix scan / may need PCN to left kidney but doubt a major improvement of creat    - encourage oral intake   - strict I/O   - trend creatinine   - HTN better controlled keep current    - Hb noted.   - potassium WNL. renal diet.   - Ca at goal. check Phos, iPTH   - h/o DM for > 10 years. not on any medications. HbA1C noted.   - slurred speech, facial drooping, weakness improved. unremarkable imaging noted. Neuro following. s/p contrast study.   - avoid nephrotoxins and hyptension   - no need for RRT   - will follow

## 2020-04-16 NOTE — PHYSICAL THERAPY INITIAL EVALUATION ADULT - IMPAIRMENTS FOUND, PT EVAL
gross motor/neuromotor development and sensory integration/gait, locomotion, and balance/aerobic capacity/endurance/fine motor/muscle strength/poor safety awareness

## 2020-04-16 NOTE — OCCUPATIONAL THERAPY INITIAL EVALUATION ADULT - RANGE OF MOTION EXAMINATION, UPPER EXTREMITY
RUE AROM in digits min impaired isolated movements and thumb opposition/Left UE Active ROM was WNL (within normal limits)/Right UE Active ROM was WFL (within functional limits)

## 2020-04-16 NOTE — OCCUPATIONAL THERAPY INITIAL EVALUATION ADULT - PLANNED THERAPY INTERVENTIONS, OT EVAL
balance training/ADL retraining/strengthening/fine motor coordination training/transfer training/motor coordination training/parent/caregiver training.../neuromuscular re-education/ROM

## 2020-04-16 NOTE — PHYSICAL THERAPY INITIAL EVALUATION ADULT - CRITERIA FOR SKILLED THERAPEUTIC INTERVENTIONS
rehab potential/impairments found/therapy frequency/anticipated discharge recommendation/functional limitations in following categories/predicted duration of therapy intervention/anticipated equipment needs at discharge/risk reduction/prevention

## 2020-04-16 NOTE — OCCUPATIONAL THERAPY INITIAL EVALUATION ADULT - ADDITIONAL COMMENTS
Pt resides in  with no steps to enter but 4 levels inside home. +bathtub shower, +driving, +working.

## 2020-04-17 ENCOUNTER — TRANSCRIPTION ENCOUNTER (OUTPATIENT)
Age: 50
End: 2020-04-17

## 2020-04-17 LAB
ALBUMIN SERPL ELPH-MCNC: 3.4 G/DL — LOW (ref 3.5–5.2)
ALP SERPL-CCNC: 68 U/L — SIGNIFICANT CHANGE UP (ref 30–115)
ALT FLD-CCNC: 19 U/L — SIGNIFICANT CHANGE UP (ref 0–41)
ANION GAP SERPL CALC-SCNC: 14 MMOL/L — SIGNIFICANT CHANGE UP (ref 7–14)
APCR PPP: 2.95 RATIO — SIGNIFICANT CHANGE UP
AST SERPL-CCNC: 29 U/L — SIGNIFICANT CHANGE UP (ref 0–41)
AT III ACT/NOR PPP CHRO: 96 % — SIGNIFICANT CHANGE UP (ref 85–135)
B2 GLYCOPROT1 AB SER QL: NEGATIVE — SIGNIFICANT CHANGE UP
BILIRUB SERPL-MCNC: 0.5 MG/DL — SIGNIFICANT CHANGE UP (ref 0.2–1.2)
BUN SERPL-MCNC: 44 MG/DL — HIGH (ref 10–20)
CALCIUM SERPL-MCNC: 8.6 MG/DL — SIGNIFICANT CHANGE UP (ref 8.5–10.1)
CARDIOLIPIN AB SER-ACNC: NEGATIVE — SIGNIFICANT CHANGE UP
CHLORIDE SERPL-SCNC: 109 MMOL/L — SIGNIFICANT CHANGE UP (ref 98–110)
CO2 SERPL-SCNC: 20 MMOL/L — SIGNIFICANT CHANGE UP (ref 17–32)
CREAT SERPL-MCNC: 5.1 MG/DL — CRITICAL HIGH (ref 0.7–1.5)
GLUCOSE BLDC GLUCOMTR-MCNC: 112 MG/DL — HIGH (ref 70–99)
GLUCOSE BLDC GLUCOMTR-MCNC: 117 MG/DL — HIGH (ref 70–99)
GLUCOSE BLDC GLUCOMTR-MCNC: 162 MG/DL — HIGH (ref 70–99)
GLUCOSE BLDC GLUCOMTR-MCNC: 87 MG/DL — SIGNIFICANT CHANGE UP (ref 70–99)
GLUCOSE SERPL-MCNC: 78 MG/DL — SIGNIFICANT CHANGE UP (ref 70–99)
HCT VFR BLD CALC: 41.8 % — LOW (ref 42–52)
HGB BLD-MCNC: 14 G/DL — SIGNIFICANT CHANGE UP (ref 14–18)
MCHC RBC-ENTMCNC: 28.1 PG — SIGNIFICANT CHANGE UP (ref 27–31)
MCHC RBC-ENTMCNC: 33.5 G/DL — SIGNIFICANT CHANGE UP (ref 32–37)
MCV RBC AUTO: 83.8 FL — SIGNIFICANT CHANGE UP (ref 80–94)
NRBC # BLD: 0 /100 WBCS — SIGNIFICANT CHANGE UP (ref 0–0)
PHOSPHATE SERPL-MCNC: 4.9 MG/DL — SIGNIFICANT CHANGE UP (ref 2.1–4.9)
PLATELET # BLD AUTO: 247 K/UL — SIGNIFICANT CHANGE UP (ref 130–400)
POTASSIUM SERPL-MCNC: 4.6 MMOL/L — SIGNIFICANT CHANGE UP (ref 3.5–5)
POTASSIUM SERPL-SCNC: 4.6 MMOL/L — SIGNIFICANT CHANGE UP (ref 3.5–5)
PROT SERPL-MCNC: 5.7 G/DL — LOW (ref 6–8)
RBC # BLD: 4.99 M/UL — SIGNIFICANT CHANGE UP (ref 4.7–6.1)
RBC # FLD: 12.7 % — SIGNIFICANT CHANGE UP (ref 11.5–14.5)
SODIUM SERPL-SCNC: 143 MMOL/L — SIGNIFICANT CHANGE UP (ref 135–146)
WBC # BLD: 9.11 K/UL — SIGNIFICANT CHANGE UP (ref 4.8–10.8)
WBC # FLD AUTO: 9.11 K/UL — SIGNIFICANT CHANGE UP (ref 4.8–10.8)

## 2020-04-17 PROCEDURE — 99233 SBSQ HOSP IP/OBS HIGH 50: CPT

## 2020-04-17 PROCEDURE — 93979 VASCULAR STUDY: CPT | Mod: 26

## 2020-04-17 RX ORDER — INSULIN GLARGINE 100 [IU]/ML
16 INJECTION, SOLUTION SUBCUTANEOUS AT BEDTIME
Refills: 0 | Status: DISCONTINUED | OUTPATIENT
Start: 2020-04-17 | End: 2020-04-18

## 2020-04-17 RX ORDER — LABETALOL HCL 100 MG
5 TABLET ORAL ONCE
Refills: 0 | Status: COMPLETED | OUTPATIENT
Start: 2020-04-17 | End: 2020-04-17

## 2020-04-17 RX ORDER — INSULIN LISPRO 100/ML
4 VIAL (ML) SUBCUTANEOUS
Refills: 0 | Status: DISCONTINUED | OUTPATIENT
Start: 2020-04-18 | End: 2020-04-19

## 2020-04-17 RX ORDER — LISINOPRIL 2.5 MG/1
5 TABLET ORAL EVERY 12 HOURS
Refills: 0 | Status: DISCONTINUED | OUTPATIENT
Start: 2020-04-17 | End: 2020-04-17

## 2020-04-17 RX ORDER — INSULIN LISPRO 100/ML
4 VIAL (ML) SUBCUTANEOUS
Refills: 0 | Status: DISCONTINUED | OUTPATIENT
Start: 2020-04-17 | End: 2020-04-19

## 2020-04-17 RX ORDER — LISINOPRIL 2.5 MG/1
10 TABLET ORAL
Refills: 0 | Status: DISCONTINUED | OUTPATIENT
Start: 2020-04-17 | End: 2020-04-18

## 2020-04-17 RX ORDER — LABETALOL HCL 100 MG
10 TABLET ORAL EVERY 6 HOURS
Refills: 0 | Status: DISCONTINUED | OUTPATIENT
Start: 2020-04-17 | End: 2020-04-17

## 2020-04-17 RX ORDER — LABETALOL HCL 100 MG
100 TABLET ORAL THREE TIMES A DAY
Refills: 0 | Status: DISCONTINUED | OUTPATIENT
Start: 2020-04-17 | End: 2020-04-17

## 2020-04-17 RX ORDER — NIFEDIPINE 30 MG
30 TABLET, EXTENDED RELEASE 24 HR ORAL EVERY 12 HOURS
Refills: 0 | Status: DISCONTINUED | OUTPATIENT
Start: 2020-04-17 | End: 2020-04-18

## 2020-04-17 RX ORDER — LABETALOL HCL 100 MG
10 TABLET ORAL EVERY 6 HOURS
Refills: 0 | Status: DISCONTINUED | OUTPATIENT
Start: 2020-04-17 | End: 2020-04-21

## 2020-04-17 RX ORDER — LISINOPRIL 2.5 MG/1
5 TABLET ORAL
Refills: 0 | Status: DISCONTINUED | OUTPATIENT
Start: 2020-04-17 | End: 2020-04-17

## 2020-04-17 RX ORDER — SODIUM CHLORIDE 9 MG/ML
500 INJECTION INTRAMUSCULAR; INTRAVENOUS; SUBCUTANEOUS ONCE
Refills: 0 | Status: COMPLETED | OUTPATIENT
Start: 2020-04-17 | End: 2020-04-18

## 2020-04-17 RX ADMIN — SENNA PLUS 1 TABLET(S): 8.6 TABLET ORAL at 11:30

## 2020-04-17 RX ADMIN — Medication 162 MILLIGRAM(S): at 11:30

## 2020-04-17 RX ADMIN — Medication 30 MILLIGRAM(S): at 19:52

## 2020-04-17 RX ADMIN — Medication 100 MILLIGRAM(S): at 13:35

## 2020-04-17 RX ADMIN — INSULIN GLARGINE 16 UNIT(S): 100 INJECTION, SOLUTION SUBCUTANEOUS at 21:02

## 2020-04-17 RX ADMIN — SENNA PLUS 1 TABLET(S): 8.6 TABLET ORAL at 05:24

## 2020-04-17 RX ADMIN — LISINOPRIL 10 MILLIGRAM(S): 2.5 TABLET ORAL at 22:04

## 2020-04-17 RX ADMIN — Medication 10 MILLIGRAM(S): at 19:56

## 2020-04-17 RX ADMIN — HEPARIN SODIUM 5000 UNIT(S): 5000 INJECTION INTRAVENOUS; SUBCUTANEOUS at 13:35

## 2020-04-17 RX ADMIN — LISINOPRIL 5 MILLIGRAM(S): 2.5 TABLET ORAL at 16:50

## 2020-04-17 RX ADMIN — CLOPIDOGREL BISULFATE 75 MILLIGRAM(S): 75 TABLET, FILM COATED ORAL at 11:30

## 2020-04-17 RX ADMIN — HEPARIN SODIUM 5000 UNIT(S): 5000 INJECTION INTRAVENOUS; SUBCUTANEOUS at 05:23

## 2020-04-17 RX ADMIN — ATORVASTATIN CALCIUM 80 MILLIGRAM(S): 80 TABLET, FILM COATED ORAL at 21:02

## 2020-04-17 RX ADMIN — LISINOPRIL 5 MILLIGRAM(S): 2.5 TABLET ORAL at 05:24

## 2020-04-17 RX ADMIN — Medication 1.25 MILLIGRAM(S): at 15:44

## 2020-04-17 RX ADMIN — HEPARIN SODIUM 5000 UNIT(S): 5000 INJECTION INTRAVENOUS; SUBCUTANEOUS at 21:02

## 2020-04-17 RX ADMIN — PIOGLITAZONE HYDROCHLORIDE 30 MILLIGRAM(S): 15 TABLET ORAL at 11:30

## 2020-04-17 RX ADMIN — PANTOPRAZOLE SODIUM 40 MILLIGRAM(S): 20 TABLET, DELAYED RELEASE ORAL at 05:24

## 2020-04-17 RX ADMIN — Medication 200 MILLIGRAM(S): at 05:24

## 2020-04-17 NOTE — PROGRESS NOTE ADULT - SUBJECTIVE AND OBJECTIVE BOX
Nephrology progress note    Patient was seen and examined, events over the last 24 h noted .  Cr stable    Allergies:  Allergy Status Unknown    Hospital Medications:   MEDICATIONS  (STANDING):  aspirin enteric coated 162 milliGRAM(s) Oral daily  atorvastatin 80 milliGRAM(s) Oral at bedtime  chlorhexidine 4% Liquid 1 Application(s) Topical <User Schedule>  clopidogrel Tablet 75 milliGRAM(s) Oral daily  dextrose 5%. 1000 milliLiter(s) (50 mL/Hr) IV Continuous <Continuous>  dextrose 50% Injectable 12.5 Gram(s) IV Push once  dextrose 50% Injectable 25 Gram(s) IV Push once  dextrose 50% Injectable 25 Gram(s) IV Push once  heparin  Injectable 5000 Unit(s) SubCutaneous every 8 hours  labetalol 100 milliGRAM(s) Oral three times a day  lisinopril 5 milliGRAM(s) Oral two times a day  pantoprazole    Tablet 40 milliGRAM(s) Oral before breakfast  pioglitazone 30 milliGRAM(s) Oral daily  senna 1 Tablet(s) Oral daily        VITALS:  T(F): 96 (20 @ 11:34), Max: 97.5 (20 @ 21:09)  HR: 81 (20 @ 11:34)  BP: 132/80 (20 @ 11:34)  RR: 18 (20 @ 11:34)  SpO2: 100% (20 @ 11:34)  Wt(kg): --        PHYSICAL EXAM:  Constitutional: NAD  HEENT: anicteric sclera, oropharynx clear, MMM  Neck: No JVD  Respiratory: CTAB, no wheezes, rales or rhonchi  Cardiovascular: S1, S2, RRR  Gastrointestinal: BS+, soft, NT/ND  Extremities: No cyanosis or clubbing. No peripheral edema  :  No hinds.   Skin: No rashes  LABS:      143  |  109  |  44<H>  ----------------------------<  78  4.6   |  20  |  5.1<HH>    Ca    8.6      2020 05:39  Phos  4.9       Mg     1.9         TPro  5.7<L>  /  Alb  3.4<L>  /  TBili  0.5  /  DBili      /  AST  29  /  ALT  19  /  AlkPhos  68                            14.0   9.11  )-----------( 247      ( 2020 05:39 )             41.8       Urine Studies:  Urinalysis Basic - ( 2020 22:50 )    Color: Yellow / Appearance: Clear / S.036 / pH:   Gluc:  / Ketone: Trace  / Bili: Negative / Urobili: <2 mg/dL   Blood:  / Protein: >600 mg/dL / Nitrite: Negative   Leuk Esterase: Negative / RBC: 3 /HPF / WBC 4 /HPF   Sq Epi:  / Non Sq Epi: 3 /HPF / Bacteria: Negative      Sodium, Random Urine: 82.0 mmoL/L (04-15 @ 11:40)  Creatinine, Random Urine: 148 mg/dL (04-15 @ 11:40)  Chloride, Random Urine: 79 (04-15 @ 11:40)  Potassium, Random Urine: 58 mmol/L (04-15 @ 11:40)    RADIOLOGY & ADDITIONAL STUDIES:

## 2020-04-17 NOTE — DISCHARGE NOTE PROVIDER - HOSPITAL COURSE
48 y/o man with pmhx of DMII not on medications and RAE not on CPAP presents from home for slurred speech and left sided facial droop.    He states that on Saturday night at 9pm, he was home with his wife who noted him drooling with slurred speech. He did not seek medical attention at the time and went to sleep.  On Sunday, patient states that he had SOB and felt tired with body aches "as if I had the flu". He was afebrile but had chills. He also had some abdominal discomfort without N/V. He denies chest pain, cough, HA or dysuria. He has a hx of hematuria (reason why he d/rodger DMII medications) and proteinuria.    In ED, NIHSS 4 AND MRS 0, CT head negative for intracranial bleed, no evidence of stroke. BP was elevated ( 235/128) and he was started on nicardipine drip but subsequently d/rodger per neurology request. Rest of VS wnl, patient on room air saturating 98% with RR 18/min    Labs consistent with elevated creatinine at 4 ( no baseline).        Patient was found to have acute R basal ganglia infarct, L subacute lacunar infarct, R posterior commissure infarct, as well as Left UPJ obstruction of unclear chronicity.        - f/u serum ferritin, LDH, ESR, CRP, fibrinogen, D-dimer, CK, CBC with diff    - c/w  mg daily and Plavix 75 mg daily, and high dose statin    - increase Lisinopril to 5mg PO bid    - switch labetalol to 100mg tid    - f/u renal artery U/S    - Notify neuro team if SBP <130    - f/u hypercoagulable work up     - Per endocrinology, d/c'd insulin and started pioglitazone 30mg po daily, if insufficient may add sitagliptin 25mg po daily    - Per urology, may f/u outpatient for renal workup of left hydronephrosis    - per nephrology, continue renal diet, strict I's and O's, encourage PO intake    - CPAP at night for RAE            Discharge instructions discussed and patient knows when to seek immediate medical attention. Patient has proper follow up. All results discussed and patient aware they may require further follow up. Stressed importance of proper follow up. Medications prescribed and changes discussed. All questions and concerns from patient and family addressed. Understanding of instructions verbalized. A 49 years old right handed gentleman, with past medical history of poorly controlled DM (A1C 9.2) and not on meds, hypercholesterolemia, DLD, ARE not on CPAP, AFlutter who presents with slurred speech, left sided facial droop, and left sided hemiparesis.         Hospital course    In ED NIHSS 4, CTH was negative for hemorrhage, patient not tPA candidate as outside window.         On brain MRI he is found to have acute right lacunar infarcts in R basal ganglia, L subacute lacunar infarct, R posterior commissure infarct, corresponding to both anterior and posterior circulation.         He was also noted to have a Cr=4.7 on admission with no previous baseline. CT abd/pelvis showed Left UPJ obstruction of unclear chronicity - atrophic left kidney with severe hydronephrosis to the UPJ. No obstructions.        During his hospital stay he was started on aspirin and statin for his stroke. He was also started on medications for appropriate blood pressure control. His weakness improved during his hospital stay. He was seen by s&s. A 49 years old right handed gentleman, with past medical history of poorly controlled DM (A1C 9.2) and not on meds, hypercholesterolemia, DLD, RAE not on CPAP, AFlutter who presents with slurred speech, left sided facial droop, and left sided hemiparesis.         Hospital course    In ED NIHSS 4, CTH was negative for hemorrhage, patient not tPA candidate as outside window.         On brain MRI he is found to have acute right lacunar infarcts in R basal ganglia, L subacute lacunar infarct, R posterior commissure infarct, corresponding to both anterior and posterior circulation.         He was also noted to have a Cr=4.7 on admission with no previous baseline. CT abd/pelvis showed Left UPJ obstruction of unclear chronicity - atrophic left kidney with severe hydronephrosis to the UPJ. No obstructions.        During his hospital stay he was started on aspirin and statin for his stroke. He was also started on medications for appropriate blood pressure control. His weakness improved during his hospital stay. He was seen by s&s.        Discharge instructions discussed and patient knows when to seek immediate medical attention. Patient has proper follow up. All results discussed and patient aware they may require further follow up. Stressed importance of proper follow up. Medications prescribed and changes discussed. All questions and concerns from patient and family addressed. Understanding of instructions verbalized.

## 2020-04-17 NOTE — DISCHARGE NOTE PROVIDER - NSDCMRMEDTOKEN_GEN_ALL_CORE_FT
alcohol swabs : Apply topically to affected area 4 times a day   aspirin 81 mg oral delayed release tablet: 2 tab(s) orally once a day  atorvastatin 80 mg oral tablet: 1 tab(s) orally once a day (at bedtime)  calcitriol 0.25 mcg oral capsule: 1 cap(s) orally once a day  clopidogrel 75 mg oral tablet: 1 tab(s) orally once a day  glucometer (per patient&#x27;s insurance): Test blood sugars four times a day. Dispense #1 glucometer.  insulin glargine 100 units/mL subcutaneous solution: 0.1 milliliter(s) (10 units) subcutaneous once a day (at bedtime)  labetalol 100 mg oral tablet: 1 tab(s) orally every 6 hours, As needed, IF SBP &gt; 150  lancets: 1 application subcutaneously 4 times a day   lisinopril 10 mg oral tablet: 1 tab(s) orally every 12 hours  NIFEdipine 30 mg oral tablet, extended release: 1 tab(s) orally once a day in the mornings  NIFEdipine 90 mg oral tablet, extended release: 1 tab(s) orally once a day (at bedtime)   pantoprazole 40 mg oral delayed release tablet: 1 tab(s) orally once a day (before a meal)  test strips (per patient&#x27;s insurance): 1 application subcutaneously 4 times a day. ** Compatible with patient&#x27;s glucometer **

## 2020-04-17 NOTE — PROGRESS NOTE ADULT - SUBJECTIVE AND OBJECTIVE BOX
Reason for Endocrinology Consult: Diabetes    HPI: 49y Male    Diabetes Type:  Duration of Diabetes:  Diabetes Complications:  History of DKA?  Eye doctor within past year?  Current Therapy:      Home FSG:  Fasting:  Lunch:  Dinner:  Bedtime:    Hypoglycemia?    Neuroglycopenia within past year?    Outpatient Endocrinologist?    PAST MEDICAL & SURGICAL HISTORY:  Proteinuria  RAE (obstructive sleep apnea)  DM (diabetes mellitus)    FAMILY HISTORY:  FHx: cancer of prostate      SH:  Smoking  Etoh:  Recreational Drugs:    Home Medications:      Current (Non-Endocrine) Meds:  acetaminophen   Tablet .. 650 milliGRAM(s) Oral every 6 hours PRN  aspirin enteric coated 162 milliGRAM(s) Oral daily  chlorhexidine 4% Liquid 1 Application(s) Topical <User Schedule>  clopidogrel Tablet 75 milliGRAM(s) Oral daily  dextrose 5%. 1000 milliLiter(s) IV Continuous <Continuous>  heparin  Injectable 5000 Unit(s) SubCutaneous every 8 hours  labetalol 100 milliGRAM(s) Oral three times a day  lisinopril 5 milliGRAM(s) Oral two times a day  pantoprazole    Tablet 40 milliGRAM(s) Oral before breakfast  senna 1 Tablet(s) Oral daily      Current Endocrine Meds:   atorvastatin 80 milliGRAM(s) Oral at bedtime  dextrose 40% Gel 15 Gram(s) Oral once PRN  dextrose 50% Injectable 12.5 Gram(s) IV Push once  dextrose 50% Injectable 25 Gram(s) IV Push once  dextrose 50% Injectable 25 Gram(s) IV Push once  glucagon  Injectable 1 milliGRAM(s) IntraMuscular once PRN      Allergies:  Allergy Status Unknown      ROS:  Denies the following except as indicated.    General: weight loss/weight gain, decreased appetite, fatigue, fever  Eyes: blurry vision, double vision  ENT: neck swelling, dysphagia, voice changes   CV: palpitations, SOB, chest pain, cough  GI: nausea, vomiting, diarrhea, constipation, abdominal pain  : nocturia,  polyuria, dysuria  Endo: decreased libido, heat/cold intolerance, jitteriness  MSK: arthralgias, myalgias  Skin: rash, dryness, diaphoresis  Neuro: pedal numbness,pedal paresthesias, pedal pain    Height (cm): 172.7 (04-15 @ 16:30)  Weight (kg): 112.1 (04-15 @ 16:30)  BMI (kg/m2): 37.6 (04-15 @ 16:30)    Vital Signs Last 24 Hrs  T(C): 35.6 (17 Apr 2020 11:34), Max: 36.4 (16 Apr 2020 21:09)  T(F): 96 (17 Apr 2020 11:34), Max: 97.5 (16 Apr 2020 21:09)  HR: 78 (17 Apr 2020 14:55) (78 - 90)  BP: 193/114 (17 Apr 2020 14:55) (132/80 - 193/114)  BP(mean): --  RR: 16 (17 Apr 2020 14:55) (16 - 18)  SpO2: 100% (17 Apr 2020 14:55) (97% - 100%)  Constitutional: WN/WD in NAD.   Neck: no thyromegaly or palpable thyroid nodules   Respiratory: lungs CTAB.  Cardiovascular: regular rate and rhythm, normal S1 and S2, no audible murmurs  GI: soft, NT/ND, no masses/HSM appreciated.  Ext: no edema, no ulcers, pedal pulses palpable bilaterally  Neurology: no tremor, monofilament sensation intact in feet  Psychiatric: A&O x 3, normal affect/mood.        LABS:                        14.0   9.11  )-----------( 247      ( 17 Apr 2020 05:39 )             41.8     04-17    143  |  109  |  44<H>  ----------------------------<  78  4.6   |  20  |  5.1<HH>    Ca    8.6      17 Apr 2020 05:39  Phos  4.9     04-17  Mg     1.9     04-16    TPro  5.7<L>  /  Alb  3.4<L>  /  TBili  0.5  /  DBili  x   /  AST  29  /  ALT  19  /  AlkPhos  68  04-17        Hemoglobin A1C, Whole Blood: 9.2 (04-13 @ 16:00)                         Thyroid Stimulating Hormone, Serum: 3.75 (04-13 @ 16:00)          RADIOLOGY & ADDITIONAL STUDIES:    A/P:49yMale    1.  DM  At discharge, recommend:      Pt can follow up at discharge with:    Above discussed with resident.

## 2020-04-17 NOTE — PROGRESS NOTE ADULT - SUBJECTIVE AND OBJECTIVE BOX
Neurocritical Care Progress Note:    1. 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    2. Today's Acute Problems: continues to have L facial droop and mild dysarthria, improved from yesterday.    3. 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 Of note, he also reports of body aches "as if I had the flu," + chills, abdominal discomfort without N/V. He denies chest pain, cough, HA or dysuria. In ED, he was found to have /128, initial NIHSS 4. Initial CT head negative for intracranial bleed and no evidence of stroke. Patient was started on nicardipine drip but it was subsequently d/rodger per neurology request due to worsening left sided weakness and for sudden drop in SBP to ~170s. He was also found to have elevated creatinine at 4 (no baseline).     4-Yesterday's Plan:  SUGGESTIONS:  - f/u serum ferritin, LDH, ESR, CRP, fibrinogen, D-dimer, CK, CBC with diff  - c/w  mg daily and start Plavix 75 mg daily, and high dose statin  - Decrease Lisinopril to 5mg PO daily  - Continue labetalol 200mg bid  - d/c nifedipine  - f/u renal artery U/S  - Notify neuro team if SBP <140  - f/u hypercoagulable work up   - Per endocrinology, may d/c insulin and start pioglitazone 30mg po daily, if insufficient may add sitagliptin 25mg po daily   - Per urology, may f/u outpatient for renal workup of left hydronephrosis  - renal diet  - CPAP at night for RAE  - Maintain strict glycemic control    5. Last 24 hour updates: Nifedipine was d/c'd, and lisinopril was decreased to 5mg. BP increased overnight to 165->170->184 mmHg, decreased to 161/91 after receiving AM lisinopril and labetalol.    6. Medications:   MEDICATIONS  (STANDING):  aspirin enteric coated 162 milliGRAM(s) Oral daily  atorvastatin 80 milliGRAM(s) Oral at bedtime  chlorhexidine 4% Liquid 1 Application(s) Topical <User Schedule>  clopidogrel Tablet 75 milliGRAM(s) Oral daily  dextrose 5%. 1000 milliLiter(s) (50 mL/Hr) IV Continuous <Continuous>  dextrose 50% Injectable 12.5 Gram(s) IV Push once  dextrose 50% Injectable 25 Gram(s) IV Push once  dextrose 50% Injectable 25 Gram(s) IV Push once  heparin  Injectable 5000 Unit(s) SubCutaneous every 8 hours  labetalol 100 milliGRAM(s) Oral three times a day  lisinopril 5 milliGRAM(s) Oral two times a day  pantoprazole    Tablet 40 milliGRAM(s) Oral before breakfast  pioglitazone 30 milliGRAM(s) Oral daily  senna 1 Tablet(s) Oral daily    MEDICATIONS  (PRN):  acetaminophen   Tablet .. 650 milliGRAM(s) Oral every 6 hours PRN Temp greater or equal to 38C (100.4F), Mild Pain (1 - 3)  dextrose 40% Gel 15 Gram(s) Oral once PRN Blood Glucose LESS THAN 70 milliGRAM(s)/deciliter  glucagon  Injectable 1 milliGRAM(s) IntraMuscular once PRN Glucose LESS THAN 70 milligrams/deciliter        Chest PT[ ]   Head of bed >35 [ ]   Out of bed to chair [X ]   PT/OT/SP Eval [ X]   Spirometry[ ]   DVT prophylaxis[ ]    8.Neuro:   Mental status: Awake, alert and oriented x 4. Attention and concentration intact. Fund of knowledge appropriate.  Language: Following commands. Naming, repetition, fluency, and comprehension intact. + Dysarthria, no aphasia.  Cranial nerves: B/l pupils equally round and reactive to light to 4->3 mm, visual fields full, no nystagmus, extraocular muscles intact, V1 through V3 intact bilaterally and symmetric, left facial droop, hearing intact to finger rub, palate elevation symmetric, tongue was midline, sternocleidomastoid/shoulder shrug strength bilaterally 5/5.    Motor: Normal bulk and tone, strength 5/5 in bilateral upper and lower extremities. No drift in all extremities.   Sensation: Intact to light touch, proprioception, and pinprick.  No neglect.   Coordination: No dysmetria on finger-to-nose and heel-to-shin.     NIH STROKE SCALE  Item	                                                        Score  1 a.	Level of Consciousness	               	0  1 b. LOC Questions	                                    0  1 c.	LOC Commands	                               	0  2.	Best Gaze	                                    0  3.	Visual	                                                0  4.	Facial Palsy	                                    2  5 a.	Motor Arm - Left	                        0  5 b.	Motor Arm - Right	                        0  6 a.	Motor Leg - Left	                        0  6 b.	Motor Leg - Right	                        0  7.	Limb Ataxia	                                    0  8.	Sensory	                                                0  9.	Language	                                    0  10.	Dysarthria	                                    1  11.	Extinction and Inattention  	            0  ________________________________________________________________________  TOTAL	                                                            4->3    mRS: baseline 0 ->1 No significant disability despite symptoms; able to carry out all usual duties and activities without assistance    Last CTH:  < from: CT Head No Cont (20 @ 08:41) >  IMPRESSION:   1. No midline shift, mass effect, or acute intracranial hemorrhage.    2. redemonstration of focal infarcts in bilateral basal ganglia and frontal corona radiata. Refer to MRI brain dated 4/15/2020 for full description of findings.    < end of copied text >  < from: CT Head No Cont (20 @ 13:14) >  IMPRESSION:   In comparison with the prior noncontrast CT scan of the brain dated 2020 time 10:27 AM:  Stable examination.  MRI of the brain is suggested for further evaluation not contraindicated in this patient.    < from: CT Brain Stroke Protocol (20 @ 10:35) >  IMPRESSION:  1.  No acute hemorrhage  2.  No acute territorial infarct  3.  Bilateral relatively symmetric diminished density within the white matter tracts of the corona radiata extending to external capsules. The appearance suggests ischemic change, but does not appear acute    Last CTA/CTP:  < from: CT Perfusion w/ Maps w/ IV Cont (20 @ 11:00) >  FINDINGS:  Aortic arch: There is a normal branching pattern. The origins of the great vessels are normal. The subclavian arteries are unremarkable.  Right carotid: The common carotid artery is normal. The bifurcation is normally patent. The cervical internal and external carotid arteries are normal.  Left carotid: The common carotid artery is unremarkable. The bifurcation is patent. The cervical internal and external carotid arteries are normal.    Anterior intracranial circulation:    Right ICA: The petrous, cavernous and supraclinoid portions of the ICA are normal. The ophthalmic artery is patent. There is a prominent posterior communicating supply to the posterior cerebral artery with hypoplastic P1. The M1, bifurcation and remaining branches of the middle cerebral artery are patent. The anterior cerebral arteryA1 segment is mildly hypoplastic.    Left ICA: The petrous, cavernous and supraclinoid portions of the ICA are patent. There may be mild narrowing of the para ophthalmic ICA. The ophthalmic artery and small posterior communicating arteries are patent. The middle cerebral artery, trifurcation and branches are patent. The anterior cerebral arteries patent.    The region of the anterior to indicating arteries normal.    Vertebral basilar vessels: The vertebral arteries are patent in the neck. The left vertebral artery is dominant. The right vertebral artery is small and terminates in a posterior inferior cerebellar artery. The left PICA is patent. The basilar artery, superior cerebellar and posterior cerebral arteries are patent.    The visualized brain parenchyma was evaluated on the routine head CT performed at same time.    Other findings: The submandibular and parotid glands are unremarkable. The thyroid gland is unremarkable.    The visualized upper aerodigestive tract is normal.    Therapid perfusion maps do not demonstrate evidence for brain ischemia using the Tmax > 6 sec and CBF < 30% mismatch volume criteria.    Images to the upper thorax demonstrates that the visualized pleural parenchyma and mediastinum are normal.    IMPRESSION:  1.  Patent carotid bifurcations.  2.  No large vessel occlusion  3.  No evidence for ischemia using rapid perfusion maps and the Tmax > 6 sec and CBF < 30% mismatch volume criteria.    Last MRI:   < from: MR Head No Cont (04.15.20 @ 09:36) >  FINDINGS:   There is restricted diffusion with corresponding T2/FLAIR hyperintense signal in the right basal ganglia extending to the right frontal corona radiata compatible with infarct.There are also additional foci of restricted diffusion in the right external capsule, in the right periatrial white matter, compatible with lacunar infarcts. There are foci of high signal in the left corona radiata on the B 1000 sequence with corresponding T2 and FLAIR hyperintense signal, but no dark signal on the ADC map, compatible with subacute infarcts. There are chronic right gangliocapsular infarcts. Scattered periventricular punctate T2 FLAIR hyperintensities consistent with microangiopathy.    Ventricles and sulci are normal in size and configuration. No extra axial collection. No mass effect or midline shift.  No evidence of hemorrhage on the gradient echo sequence. The major vessel flow voids are preserved at the skull base.  Cerebellar tonsils are in normal location.      No gross orbital mass. Mild mucosal thickening in bilateral ethmoid air cells and right frontal and left maxillary sinuses. Mastoid air cells are normal in signal. Bone marrow signal is unremarkable.    IMPRESSION: Bilateral focal/lacunar acute and subacute infarcts as detailed above compatible with cardioembolic infarcts. No hemorrhagic transformation.    Last TCD: NONE    Last EEG: NONE      9. Cardiovascular:   Vital Signs Last 24 Hrs  T(C): 35.6 (2020 11:34), Max: 36.4 (2020 21:09)  T(F): 96 (2020 11:34), Max: 97.5 (2020 21:09)  HR: 81 (2020 11:34) (77 - 90)  BP: 132/80 (2020 11:34) (132/80 - 184/89)  BP(mean): --  RR: 18 (2020 11:34) (16 - 18)  SpO2: 100% (2020 11:34) (97% - 100%)    Last Echo:   < from: Transthoracic Echocardiogram (04.15.20 @ 09:54) >  EXAM:  2-D ECHO (TTE) COMPLETE    PROCEDURE DATE:  04/15/2020      INTERPRETATION:  REPORT:    TRANSTHORACIC ECHOCARDIOGRAM REPORT      Patient Name:   TARUN DEL TORO Accession #: [id number]  Medical Rec #:  [id number]       Height:      62.0 in 157.5 cm  YOB: 1970      Weight:      68.0 lb 30.84 kg  Patient Age:    49 years       BSA:         1.21 m²  Patient Gender: M              BP:          190/98 mmHg       Date of Exam:        4/15/2020 9:54:22 AM  Referring Physician: [id number] ED UNASSIGNED  Sonographer:         Adela Coe  Reading Physician:   Gus Butler M.D.    Procedure:   2D Echo/Doppler/Color Doppler Complete and Saline Contrast (Bubble               Study).  Indications: I69.998 - Other Sequelae following unspecified Cerebrovascular D  Diagnosis:   Other forms of dyspnea - R06.09    Summary:   1. LV Ejection Fraction by Ortiz's Method with a biplane EF of 57 %.   2. Moderately increased LV wall thickness.   3. Spectral Doppler shows impaired relaxation pattern of left ventricular myocardial filling (Grade I diastolic dysfunction).   4. Mild mitral valve regurgitation.   5. Color flow doppler and intravenous injection of agitated saline demonstrates the presence of an intact intra atrial septum.    PHYSICIAN INTERPRETATION:  Left Ventricle: The left ventricular internal cavity size is normal. Left ventricular wall thickness is moderately increased. Spectral Doppler shows impaired relaxation pattern of left ventricular myocardial filling (Grade I diastolic dysfunction).  Right Ventricle: Normal right ventricular size and function.  Left Atrium: Mildly enlarged left atrium. Color flow doppler and intravenous injection of agitated saline demonstrates the presence of an intact intra atrial septum.  Right Atrium: Normal right atrial size.  Pericardium: There is no evidence of pericardial effusion.  Mitral Valve: No evidence of mitral stenosis. Mild mitral valve regurgitation is seen.  Tricuspid Valve: Mild tricuspid regurgitation isvisualized.  Aortic Valve: No evidence of aortic stenosis. Aortic valve thickening with normal leaflet opening. Trivial aortic valve regurgitation is seen.  Pulmonic Valve: Mild pulmonic valve regurgitation.  Aorta: The aortic root is normal in size and structure.  Venous: The inferior vena cava was normal sized, with respiratory size variation greater than 50%.  Shunts: Agitated saline contrast was given intravenously to evaluate for intracardiac shunting.       2D AND M-MODE MEASUREMENTS (normal ranges within parentheses):  Left                  Normal   Aorta/Left             Normal  Ventricle:                     Atrium:  IVSd (2D):  1.43 cm  (0.7-1.1) AoV Cusp       1.87  (1.5-2.6)  LVPWd (2D): 1.38 cm  (0.7-1.1) Separation:     cm  LVIDd (2D): 4.12 cm  (3.4-5.7) Left Atrium    3.86  (1.9-4.0)  LVIDs (2D): 2.77 cm            (Mmode):        cm  LV FS (2D):  32.8 %   (>25%)   LA Volume      34.3  IVSd        1.48 cm  (0.7-1.1) Index         ml/m²  (Mmode):                       Right  LVPWd       1.08 cm  (0.7-1.1) Ventricle:  (Mmode):                       RVd (Mmode):   1.44 cm  LVIDd       4.48 cm  (3.4-5.7) RVd (2D):      2.31 cm  (Mmode):  LVIDs       3.20 cm  (Mmode):  LV FS        28.6 %   (>25%)  (Mmode):  Relative      0.67    (<0.42)  Wall  Thickness  Rel. Wall     0.48    (<0.42)  Thickness  Mm  LV Mass      179.4  Index:        g/m²  Mmode    SPECTRAL DOPPLER ANALYSIS:  LV DIASTOLIC FUNCTION:  MV Peak E: 0.51 m/s Decel Time: 247 msec  MV Peak A: 0.79 m/s  E/A Ratio: 0.64    LVOT Vmax: 0.65 m/s  LVOT VTI:  0.15 m  LVOT Diam: 2.27 cm    Mitral Valve:  MV P1/2 Time: 71.55 msec  MV Area, PHT: 3.07 cm²    Tricuspid Valve and PA/RV Systolic Pressure: TR Max Velocity: 0.92 m/s RA Pressure: 3 mmHg RVSP/PASP: 6.4 mmHg    Pulmonic Valve:  PV Max Velocity: 1.06 m/s PV Max P.5 mmHg PV Mean PG:    Last EKG:  < from: 12 Lead ECG (20 @ 16:59) >  Ventricular Rate 83 BPM    Atrial Rate 83 BPM    P-R Interval 174 ms    QRS Duration 90 ms    Q-T Interval 382 ms    QTC Calculation(Bezet) 448 ms    P Axis 40 degrees    R Axis 79 degrees    T Axis 27 degrees    Diagnosis Line Normal sinus rhythm  Normal ECG    < end of copied text >      SBP target: >130    Enzymes/Trop:  Troponin T, Serum (20 @ 17:07)    Troponin T, Serum: 0.06: Critical value: ng/mL      10. Respiratory:     Chest Xray:  < from: Xray Chest 1 View- PORTABLE-Routine (20 @ 10:45) >  IMPRESSION:      No radiographic evidence of acute cardiopulmonary disease.    < end of copied text >      11.GI:    Prophyaxis: Protonix 40 mg daily  Bowel mvt: yes    Abd distension: No  LIVER FUNCTIONS - ( 2020 05:39 )  Alb: 3.4 g/dL / Pro: 5.7 g/dL / ALK PHOS: 68 U/L / ALT: 19 U/L / AST: 29 U/L / GGT: x                  12.Renal/Fluids/Electrolytes:        143  |  109  |  44<H>  ----------------------------<  78  4.6   |  20  |  5.1<HH>    Ca    8.6      2020 05:39  Phos  4.9     04-17  Mg     1.9     04-16    TPro  5.7<L>  /  Alb  3.4<L>  /  TBili  0.5  /  DBili  x   /  AST  29  /  ALT  19  /  AlkPhos  68            13.ID:   Vital Signs Last 24 Hrs  T(C): 35.6 (2020 11:34), Max: 36.4 (2020 21:09)  T(F): 96 (2020 11:34), Max: 97.5 (2020 21:09)  HR: 81 (2020 11:34) (77 - 90)  BP: 132/80 (2020 11:34) (132/80 - 184/89)  BP(mean): --  RR: 18 (2020 11:34) (16 - 18)  SpO2: 100% (2020 11:34) (97% - 100%)        Lines: Central[] Date inserted: Peripheral[x]    14. Hematology:                         14.0   9.11  )-----------( 247      ( 2020 05:39 )             41.8         143  |  109  |  44<H>  ----------------------------<  78  4.6   |  20  |  5.1<HH>    Ca    8.6      2020 05:39  Phos  4.9     04-17  Mg     1.9     04-16    TPro  5.7<L>  /  Alb  3.4<L>  /  TBili  0.5  /  DBili  x   /  AST  29  /  ALT  19  /  AlkPhos  68  04-17      DVT Prophylaxis Lovenox[ ] Heparin[ x ] Venodynes[ ] SCD's[ ]

## 2020-04-17 NOTE — PROGRESS NOTE ADULT - ASSESSMENT
50 y/o M with JORGE in hospital for slurred speech, left side facial droop, SOB, chills but afebrile, body aches.  PMH DM, RAE, Proteinuria    # JORGE: no baseline creatinine available. last OP labs ~ 5 years ago. poor compliance with meds./ most likely JORGE on CKD obstructive in nature and progressing over time as evidenced by CT findings    - ? etiology most likely chronic left obstruction with ATN now    - serum creatinine relatively stable   - appreciate urology eval / agree with lasix scan / may need PCN to left kidney but doubt a major improvement of creat given severe cortical thinning    - encourage oral intake   - strict I/O   - trend creatinine   - HTN better controlled keep current    - Hb noted.   - potassium WNL. renal diet.   - Ca at goal. check Phos, iPTH   - h/o DM for > 10 years. not on any medications. HbA1C noted.   - slurred speech, facial drooping, weakness improved. unremarkable imaging noted. Neuro following. s/p contrast study.   - avoid nephrotoxins and hyptension   - no need for RRT   - will follow

## 2020-04-17 NOTE — DISCHARGE NOTE PROVIDER - PROVIDER TOKENS
PROVIDER:[TOKEN:[[id number]]] PROVIDER:[TOKEN:[[id number]]],PROVIDER:[TOKEN:[[id number]]] PROVIDER:[TOKEN:[[id number]],FOLLOWUP:[Routine]],PROVIDER:[TOKEN:[[id number]],FOLLOWUP:[1 week]],PROVIDER:[TOKEN:[[id number]],FOLLOWUP:[Routine]]

## 2020-04-17 NOTE — PROGRESS NOTE ADULT - ASSESSMENT
15. Impression:  A 49 years old right handed man with uncontrolled DM (A1C 9.2), DLD, REA, Aflutter who presents with slurred speech, left sided facial droop, and left sided hemiparesis, body aches and chills. In ED NIHSS 4, CTH was negative for hemorrhage, patient not tPA candidate as outside window. Patient with hypertensive urgency 220/110 and was started on Cardene drip but discontinued per Neuro d/t worsening left sided weakness. On brain MRI he is found to have acute right lacunar infarcts in R basal ganglia, L subacute lacunar infarct, R posterior commissure infarct, corresponding to both anterior and posterior circulation.   CT abd/pelvis showed Left UPJ obstruction of unclear chronicity - atrophic left kidney with severe hydronephrosis to the UPJ. No obstructions.  Of note he is with history of Aflutter and risk factors including DM, HTN, cannot exclude possible etiology of atypical embolic source and therefore further work up is warranted. Of note, patient had COVID PCR negative, remains afebrile but he is with persistent cough.     SUGGESTIONS:  - f/u serum ferritin, LDH, ESR, CRP, fibrinogen, D-dimer, CK, CBC with diff  - c/w  mg daily and Plavix 75 mg daily, and high dose statin  - increase Lisinopril to 5mg PO bid  - switch labetalol to 100mg tid  - f/u renal artery U/S  - Notify neuro team if SBP <130  - f/u hypercoagulable work up   - Per endocrinology, d/c'd insulin and started pioglitazone 30mg po daily, if insufficient may add sitagliptin 25mg po daily  - Per urology, may f/u outpatient for renal workup of left hydronephrosis  - per nephrology, continue renal diet, strict I's and O's, encourage PO intake  - CPAP at night for RAE    17. Disposition:  -to home pending medical stabilization 15. Impression:  A 49 years old right handed man with uncontrolled DM (A1C 9.2), DLD, RAE, Aflutter who presents with slurred speech, left sided facial droop, and left sided hemiparesis, body aches and chills. In ED NIHSS 4, CTH was negative for hemorrhage, patient not tPA candidate as outside window. Patient with hypertensive urgency 220/110 and was started on Cardene drip but discontinued per Neuro d/t worsening left sided weakness. On brain MRI he is found to have acute right lacunar infarcts in R basal ganglia, L subacute lacunar infarct, R posterior commissure infarct, corresponding to both anterior and posterior circulation.   CT abd/pelvis showed Left UPJ obstruction of unclear chronicity - atrophic left kidney with severe hydronephrosis to the UPJ. No obstructions.  Of note he is with history of Aflutter and risk factors including DM, HTN, cannot exclude possible etiology of atypical embolic source and therefore further work up is warranted. Of note, patient had COVID PCR negative, remains afebrile but he is with persistent cough.     SUGGESTIONS:  - f/u serum ferritin, LDH, ESR, CRP, fibrinogen, D-dimer, CK, CBC with diff  - c/w  mg daily and Plavix 75 mg daily, and high dose statin  - increase Lisinopril to 5mg PO bid  - switch labetalol to 100mg tid  - f/u renal artery U/S  - Notify neuro team if SBP <130  - f/u hypercoagulable work up   - Per endocrinology, d/c'd insulin and started pioglitazone 30mg po daily, if insufficient may add sitagliptin 25mg po daily  - Per urology, may f/u outpatient for renal workup of left hydronephrosis  - per nephrology, continue renal diet, strict I's and O's, encourage PO intake  - CPAP at night for RAE    17. Disposition:  -to home pending medical stabilization    ADDENDUM 4/17/2020 7:00PM - Pt's SBP labile and uncontrolled ~190s. Will adjust to nifedipine XL 30 mg PO Q12h (6PM/6AM) and continue with Lisinopril 5 mg PO Q12h (11AM/11PM) and labetalol 10 mg IVP Q6H PRN for SBP > 150. Hold all antihypertensive medications if SBP < 130 and notify our team [phone]. Also, made aware that patient with diarrhea with Actos and won't be compliant with it. Called Endocrinology on call, and would recommend Lanus 16 units QHS and Lispro 4 units with each meal and sliding scale low correction if needed. On discharge, will recommend Lantus insulin due to high A1C and linagliptin 5 mg daily. 15. Impression:  A 49 years old right handed man with uncontrolled DM (A1C 9.2), DLD, RAE, Aflutter who presents with slurred speech, left sided facial droop, and left sided hemiparesis, body aches and chills. In ED NIHSS 4, CTH was negative for hemorrhage, patient not tPA candidate as outside window. Patient with hypertensive urgency 220/110 and was started on Cardene drip but discontinued per Neuro d/t worsening left sided weakness. On brain MRI he is found to have acute right lacunar infarcts in R basal ganglia, L subacute lacunar infarct, R posterior commissure infarct, corresponding to both anterior and posterior circulation.   CT abd/pelvis showed Left UPJ obstruction of unclear chronicity - atrophic left kidney with severe hydronephrosis to the UPJ. No obstructions.  Of note he is with history of Aflutter and risk factors including DM, HTN, cannot exclude possible etiology of atypical embolic source and therefore further work up is warranted. Of note, patient had COVID PCR negative, remains afebrile but he is with persistent cough.     SUGGESTIONS:  - f/u serum ferritin, LDH, ESR, CRP, fibrinogen, D-dimer, CK, CBC with diff  - c/w  mg daily and Plavix 75 mg daily, and high dose statin  - increase Lisinopril to 5mg PO bid  - switch labetalol to 100mg tid  - f/u renal artery U/S  - Notify neuro team if SBP <130  - f/u hypercoagulable work up   - Per endocrinology, d/c'd insulin and started pioglitazone 30mg po daily, if insufficient may add sitagliptin 25mg po daily  - Per urology, may f/u outpatient for renal workup of left hydronephrosis  - per nephrology, continue renal diet, strict I's and O's, encourage PO intake  - CPAP at night for RAE    17. Disposition:  -to home pending medical stabilization    ADDENDUM 4/17/2020 7:00PM - Pt's SBP labile and uncontrolled ~190s. Will adjust to nifedipine XL 30 mg PO Q12h (6PM/6AM) and continue with Lisinopril 5 mg PO Q12h (11AM/11PM) and labetalol 10 mg IVP Q6H PRN for SBP > 150. Hold all antihypertensive medications if SBP < 130 and notify our team [phone]. Also, made aware that patient with diarrhea with Actos and won't be compliant with it. Called Endocrinology on call at [phone], and would recommend Lanus 16 units QHS and Lispro 4 units with each meal and sliding scale low correction if needed. On discharge, will recommend Lantus insulin due to high A1C and linagliptin 5 mg daily.

## 2020-04-17 NOTE — DISCHARGE NOTE PROVIDER - NSDCCPCAREPLAN_GEN_ALL_CORE_FT
PRINCIPAL DISCHARGE DIAGNOSIS  Diagnosis: Stroke  Assessment and Plan of Treatment: PRINCIPAL DISCHARGE DIAGNOSIS  Diagnosis: Stroke  Assessment and Plan of Treatment: You had strokes in multiple areas of your brain, most likely due to blood clots forming in the heart and travelling to the brain due to paroxysmal atrial flutter or atrial fibrillation. Please continue to take your medications as prescribed and follow up with the stroke neurology team on an outpatient basis.      SECONDARY DISCHARGE DIAGNOSES  Diagnosis: Ureteropelvic junction obstruction  Assessment and Plan of Treatment: You were founbd to have a left ureteropelvic junction obstruction of unclear chronicity, with atrophy of the left kidney and hydronephrosis. Please follow up with the urology team on an outpatient basis for a MAG 3 renal scan with lasix washout to determine left renal function. PRINCIPAL DISCHARGE DIAGNOSIS  Diagnosis: Stroke  Assessment and Plan of Treatment: You had strokes in multiple areas of your brain, most likely due to blood clots forming in the heart and travelling to the brain due to paroxysmal atrial flutter or atrial fibrillation. Please continue to take your medications as prescribed and follow up with the stroke neurology team on an outpatient basis. Please continue to take aspirin and plavix until your BP stabilizes so that you can be switched over to Eliquis. Please keep a BP log to f/u with the stroke team.      SECONDARY DISCHARGE DIAGNOSES  Diagnosis: Ureteropelvic junction obstruction  Assessment and Plan of Treatment: You were found to have a left ureteropelvic junction obstruction of unclear chronicity, with atrophy of the left kidney and hydronephrosis. Please follow up with the urology team on an outpatient basis for a MAG 3 renal scan with lasix washout to determine left renal function.

## 2020-04-17 NOTE — DISCHARGE NOTE PROVIDER - CARE PROVIDERS DIRECT ADDRESSES
,[email or web address],DirectAddress_Unknown

## 2020-04-17 NOTE — DISCHARGE NOTE PROVIDER - CARE PROVIDER_API CALL
Jt Kam)  EEGEpilepsy; Neurology  [address] [place] [address], [address]  [place] [address]  Phone: [phone]  Fax: [phone]  Follow Up Time: Jt Kam)  EEGEpilepsy; Neurology  [address] [facility], [address]  [place] [address]  Phone: [phone]  Fax: [phone]  Follow Up Time:     Leland Gauthier)  Urology  [address] [facility], [address]  [place] [address]  Phone: [phone]  Fax: [phone]  Follow Up Time: Leland Gauthier)  Urology  [address] [facility], [address]  [place] [address]  Phone: [phone]  Fax: [phone]  Follow Up Time: Routine    Rudy Valencia)  Neurology; Vascular Neurology  [address] [place] [address], [facility] [address]  [place], [place] [id number]  Phone: [phone]  Fax: [phone]  Follow Up Time: 1 week    Sean Qureshi)  Internal Medicine; Nephrology  [address] [place] [address]  Phone: [phone]  Fax: [phone]  Follow Up Time: Routine

## 2020-04-17 NOTE — DISCHARGE NOTE PROVIDER - NSDCQMMRS_NEU_ALL_CORE
2 - Slight disability. Able to lookafter own affairs without assistance, but unable to carry out all previous activities 1 - No significant disability. Able to carry out all usual activities, despite some symptoms

## 2020-04-18 LAB
ALBUMIN SERPL ELPH-MCNC: 3.7 G/DL — SIGNIFICANT CHANGE UP (ref 3.5–5.2)
ALP SERPL-CCNC: 76 U/L — SIGNIFICANT CHANGE UP (ref 30–115)
ALT FLD-CCNC: 44 U/L — HIGH (ref 0–41)
ANION GAP SERPL CALC-SCNC: 14 MMOL/L — SIGNIFICANT CHANGE UP (ref 7–14)
AST SERPL-CCNC: 71 U/L — HIGH (ref 0–41)
BILIRUB SERPL-MCNC: 0.6 MG/DL — SIGNIFICANT CHANGE UP (ref 0.2–1.2)
BUN SERPL-MCNC: 44 MG/DL — HIGH (ref 10–20)
CALCIUM SERPL-MCNC: 8.6 MG/DL — SIGNIFICANT CHANGE UP (ref 8.4–10.5)
CALCIUM SERPL-MCNC: 8.9 MG/DL — SIGNIFICANT CHANGE UP (ref 8.5–10.1)
CHLORIDE SERPL-SCNC: 107 MMOL/L — SIGNIFICANT CHANGE UP (ref 98–110)
CO2 SERPL-SCNC: 20 MMOL/L — SIGNIFICANT CHANGE UP (ref 17–32)
CREAT SERPL-MCNC: 5 MG/DL — CRITICAL HIGH (ref 0.7–1.5)
GLUCOSE BLDC GLUCOMTR-MCNC: 121 MG/DL — HIGH (ref 70–99)
GLUCOSE BLDC GLUCOMTR-MCNC: 81 MG/DL — SIGNIFICANT CHANGE UP (ref 70–99)
GLUCOSE BLDC GLUCOMTR-MCNC: 87 MG/DL — SIGNIFICANT CHANGE UP (ref 70–99)
GLUCOSE BLDC GLUCOMTR-MCNC: 99 MG/DL — SIGNIFICANT CHANGE UP (ref 70–99)
GLUCOSE SERPL-MCNC: 96 MG/DL — SIGNIFICANT CHANGE UP (ref 70–99)
HCT VFR BLD CALC: 46.1 % — SIGNIFICANT CHANGE UP (ref 42–52)
HGB BLD-MCNC: 15.2 G/DL — SIGNIFICANT CHANGE UP (ref 14–18)
MCHC RBC-ENTMCNC: 27.6 PG — SIGNIFICANT CHANGE UP (ref 27–31)
MCHC RBC-ENTMCNC: 33 G/DL — SIGNIFICANT CHANGE UP (ref 32–37)
MCV RBC AUTO: 83.8 FL — SIGNIFICANT CHANGE UP (ref 80–94)
NRBC # BLD: 0 /100 WBCS — SIGNIFICANT CHANGE UP (ref 0–0)
PLATELET # BLD AUTO: 252 K/UL — SIGNIFICANT CHANGE UP (ref 130–400)
POTASSIUM SERPL-MCNC: 4.5 MMOL/L — SIGNIFICANT CHANGE UP (ref 3.5–5)
POTASSIUM SERPL-SCNC: 4.5 MMOL/L — SIGNIFICANT CHANGE UP (ref 3.5–5)
PROT S FREE PPP-ACNC: 104 % NORMAL — SIGNIFICANT CHANGE UP (ref 70–150)
PROT SERPL-MCNC: 6.3 G/DL — SIGNIFICANT CHANGE UP (ref 6–8)
PTH-INTACT FLD-MCNC: 231 PG/ML — HIGH (ref 15–65)
RBC # BLD: 5.5 M/UL — SIGNIFICANT CHANGE UP (ref 4.7–6.1)
RBC # FLD: 12.6 % — SIGNIFICANT CHANGE UP (ref 11.5–14.5)
SODIUM SERPL-SCNC: 141 MMOL/L — SIGNIFICANT CHANGE UP (ref 135–146)
WBC # BLD: 6.95 K/UL — SIGNIFICANT CHANGE UP (ref 4.8–10.8)
WBC # FLD AUTO: 6.95 K/UL — SIGNIFICANT CHANGE UP (ref 4.8–10.8)

## 2020-04-18 PROCEDURE — 99232 SBSQ HOSP IP/OBS MODERATE 35: CPT

## 2020-04-18 RX ORDER — INSULIN GLARGINE 100 [IU]/ML
12 INJECTION, SOLUTION SUBCUTANEOUS AT BEDTIME
Refills: 0 | Status: DISCONTINUED | OUTPATIENT
Start: 2020-04-18 | End: 2020-04-19

## 2020-04-18 RX ORDER — LISINOPRIL 2.5 MG/1
10 TABLET ORAL
Refills: 0 | Status: DISCONTINUED | OUTPATIENT
Start: 2020-04-18 | End: 2020-04-20

## 2020-04-18 RX ORDER — CALCITRIOL 0.5 UG/1
0.25 CAPSULE ORAL DAILY
Refills: 0 | Status: DISCONTINUED | OUTPATIENT
Start: 2020-04-18 | End: 2020-04-22

## 2020-04-18 RX ORDER — NIFEDIPINE 30 MG
30 TABLET, EXTENDED RELEASE 24 HR ORAL
Refills: 0 | Status: DISCONTINUED | OUTPATIENT
Start: 2020-04-18 | End: 2020-04-19

## 2020-04-18 RX ADMIN — LISINOPRIL 10 MILLIGRAM(S): 2.5 TABLET ORAL at 22:57

## 2020-04-18 RX ADMIN — SODIUM CHLORIDE 1000 MILLILITER(S): 9 INJECTION INTRAMUSCULAR; INTRAVENOUS; SUBCUTANEOUS at 04:23

## 2020-04-18 RX ADMIN — HEPARIN SODIUM 5000 UNIT(S): 5000 INJECTION INTRAVENOUS; SUBCUTANEOUS at 05:16

## 2020-04-18 RX ADMIN — CLOPIDOGREL BISULFATE 75 MILLIGRAM(S): 75 TABLET, FILM COATED ORAL at 11:45

## 2020-04-18 RX ADMIN — HEPARIN SODIUM 5000 UNIT(S): 5000 INJECTION INTRAVENOUS; SUBCUTANEOUS at 14:37

## 2020-04-18 RX ADMIN — Medication 162 MILLIGRAM(S): at 11:45

## 2020-04-18 RX ADMIN — Medication 5 MILLIGRAM(S): at 00:11

## 2020-04-18 RX ADMIN — LISINOPRIL 10 MILLIGRAM(S): 2.5 TABLET ORAL at 11:45

## 2020-04-18 RX ADMIN — Medication 30 MILLIGRAM(S): at 06:46

## 2020-04-18 RX ADMIN — ATORVASTATIN CALCIUM 80 MILLIGRAM(S): 80 TABLET, FILM COATED ORAL at 22:03

## 2020-04-18 RX ADMIN — HEPARIN SODIUM 5000 UNIT(S): 5000 INJECTION INTRAVENOUS; SUBCUTANEOUS at 22:03

## 2020-04-18 RX ADMIN — Medication 30 MILLIGRAM(S): at 17:05

## 2020-04-18 RX ADMIN — CHLORHEXIDINE GLUCONATE 1 APPLICATION(S): 213 SOLUTION TOPICAL at 05:16

## 2020-04-18 RX ADMIN — Medication 10 MILLIGRAM(S): at 21:20

## 2020-04-18 RX ADMIN — PANTOPRAZOLE SODIUM 40 MILLIGRAM(S): 20 TABLET, DELAYED RELEASE ORAL at 08:28

## 2020-04-18 RX ADMIN — CALCITRIOL 0.25 MICROGRAM(S): 0.5 CAPSULE ORAL at 17:05

## 2020-04-18 NOTE — PROGRESS NOTE ADULT - SUBJECTIVE AND OBJECTIVE BOX
STROKE Progress Note:    1. 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    2. Today's Acute Problems: Scattered bilateral hemispheric infarcts (BG, corona radiata) compatible with probable cardioembolism    3. 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 Of note, he also reports of body aches "as if I had the flu," + chills, abdominal discomfort without N/V. He denies chest pain, cough, HA or dysuria. In ED, he was found to have /128, initial NIHSS 4. Initial CT head negative for intracranial bleed and no evidence of stroke. Patient was started on nicardipine drip but it was subsequently d/rodger per neurology request due to worsening left sided weakness and for sudden drop in SBP to ~170s. He was also found to have elevated creatinine at 4 (no baseline).     4-Yesterday's Plan:    - f/u serum ferritin, LDH, ESR, CRP, fibrinogen, D-dimer, CK, CBC with diff  - c/w  mg daily and Plavix 75 mg daily, and high dose statin  - increase Lisinopril to 5mg PO bid  - switch labetalol to 100mg tid  - f/u renal artery U/S  - Notify neuro team if SBP <130  - f/u hypercoagulable work up   - Per endocrinology, d/c'd insulin and started pioglitazone 30mg po daily, if insufficient may add sitagliptin 25mg po daily  - Per urology, may f/u outpatient for renal workup of left hydronephrosis  - per nephrology, continue renal diet, strict I's and O's, encourage PO intake  - CPAP at night for RAE    5. Last 24 hour updates: BP remained difficult to control. Nifedipine added back. Lisinopril 5 mg bid. Still requiring repeat Labetalol injections and BP remained in 180s-190s despite this.     6. Medications:   MEDICATIONS  (STANDING):  aspirin enteric coated 162 milliGRAM(s) Oral daily  atorvastatin 80 milliGRAM(s) Oral at bedtime  chlorhexidine 4% Liquid 1 Application(s) Topical <User Schedule>  clopidogrel Tablet 75 milliGRAM(s) Oral daily  dextrose 5%. 1000 milliLiter(s) (50 mL/Hr) IV Continuous <Continuous>  dextrose 50% Injectable 12.5 Gram(s) IV Push once  dextrose 50% Injectable 25 Gram(s) IV Push once  dextrose 50% Injectable 25 Gram(s) IV Push once  heparin  Injectable 5000 Unit(s) SubCutaneous every 8 hours  labetalol 100 milliGRAM(s) Oral three times a day  lisinopril 5 milliGRAM(s) Oral two times a day  pantoprazole    Tablet 40 milliGRAM(s) Oral before breakfast  pioglitazone 30 milliGRAM(s) Oral daily  senna 1 Tablet(s) Oral daily    MEDICATIONS  (PRN):  acetaminophen   Tablet .. 650 milliGRAM(s) Oral every 6 hours PRN Temp greater or equal to 38C (100.4F), Mild Pain (1 - 3)  dextrose 40% Gel 15 Gram(s) Oral once PRN Blood Glucose LESS THAN 70 milliGRAM(s)/deciliter  glucagon  Injectable 1 milliGRAM(s) IntraMuscular once PRN Glucose LESS THAN 70 milligrams/deciliter        Chest PT[ ]   Head of bed >35 [ ]   Out of bed to chair [X ]   PT/OT/SP Eval [ X]   Spirometry[ ]   DVT prophylaxis[ ]    8.Neuro:   Mental status: Awake, alert and oriented x 4. Attention and concentration intact. Fund of knowledge appropriate.  Language: Following commands. Naming, repetition, fluency, and comprehension intact. + Dysarthria, no aphasia.  Cranial nerves: B/l pupils equally round and reactive to light to 4->3 mm, visual fields full, no nystagmus, extraocular muscles intact, V1 through V3 intact bilaterally and symmetric, left facial droop, hearing intact to finger rub, palate elevation symmetric, tongue was midline, sternocleidomastoid/shoulder shrug strength bilaterally 5/5.    Motor: Normal bulk and tone, strength 5/5 in bilateral upper and lower extremities. No drift in all extremities.   Sensation: Intact to light touch, proprioception, and pinprick.  No neglect.   Coordination: No dysmetria on finger-to-nose and heel-to-shin.     NIH STROKE SCALE  Item	                                                        Score  1 a.	Level of Consciousness	               	0  1 b. LOC Questions	                                    0  1 c.	LOC Commands	                               	0  2.	Best Gaze	                                    0  3.	Visual	                                                0  4.	Facial Palsy	                                    2  5 a.	Motor Arm - Left	                        0  5 b.	Motor Arm - Right	                        0  6 a.	Motor Leg - Left	                        0  6 b.	Motor Leg - Right	                        0  7.	Limb Ataxia	                                    0  8.	Sensory	                                                0  9.	Language	                                    0  10.	Dysarthria	                                    1  11.	Extinction and Inattention  	            0  ________________________________________________________________________  TOTAL	                                                            4->3    mRS: baseline 0 ->1 No significant disability despite symptoms; able to carry out all usual duties and activities without assistance    Last CTH:  < from: CT Head No Cont (20 @ 08:41) >  IMPRESSION:   1. No midline shift, mass effect, or acute intracranial hemorrhage.    2. redemonstration of focal infarcts in bilateral basal ganglia and frontal corona radiata. Refer to MRI brain dated 4/15/2020 for full description of findings.    < end of copied text >  < from: CT Head No Cont (20 @ 13:14) >  IMPRESSION:   In comparison with the prior noncontrast CT scan of the brain dated 2020 time 10:27 AM:  Stable examination.  MRI of the brain is suggested for further evaluation not contraindicated in this patient.    < from: CT Brain Stroke Protocol (20 @ 10:35) >  IMPRESSION:  1.  No acute hemorrhage  2.  No acute territorial infarct  3.  Bilateral relatively symmetric diminished density within the white matter tracts of the corona radiata extending to external capsules. The appearance suggests ischemic change, but does not appear acute    Last CTA/CTP:  < from: CT Perfusion w/ Maps w/ IV Cont (20 @ 11:00) >  FINDINGS:  Aortic arch: There is a normal branching pattern. The origins of the great vessels are normal. The subclavian arteries are unremarkable.  Right carotid: The common carotid artery is normal. The bifurcation is normally patent. The cervical internal and external carotid arteries are normal.  Left carotid: The common carotid artery is unremarkable. The bifurcation is patent. The cervical internal and external carotid arteries are normal.    Anterior intracranial circulation:    Right ICA: The petrous, cavernous and supraclinoid portions of the ICA are normal. The ophthalmic artery is patent. There is a prominent posterior communicating supply to the posterior cerebral artery with hypoplastic P1. The M1, bifurcation and remaining branches of the middle cerebral artery are patent. The anterior cerebral arteryA1 segment is mildly hypoplastic.    Left ICA: The petrous, cavernous and supraclinoid portions of the ICA are patent. There may be mild narrowing of the para ophthalmic ICA. The ophthalmic artery and small posterior communicating arteries are patent. The middle cerebral artery, trifurcation and branches are patent. The anterior cerebral arteries patent.    The region of the anterior to indicating arteries normal.    Vertebral basilar vessels: The vertebral arteries are patent in the neck. The left vertebral artery is dominant. The right vertebral artery is small and terminates in a posterior inferior cerebellar artery. The left PICA is patent. The basilar artery, superior cerebellar and posterior cerebral arteries are patent.    The visualized brain parenchyma was evaluated on the routine head CT performed at same time.    Other findings: The submandibular and parotid glands are unremarkable. The thyroid gland is unremarkable.    The visualized upper aerodigestive tract is normal.    Therapid perfusion maps do not demonstrate evidence for brain ischemia using the Tmax > 6 sec and CBF < 30% mismatch volume criteria.    Images to the upper thorax demonstrates that the visualized pleural parenchyma and mediastinum are normal.    IMPRESSION:  1.  Patent carotid bifurcations.  2.  No large vessel occlusion  3.  No evidence for ischemia using rapid perfusion maps and the Tmax > 6 sec and CBF < 30% mismatch volume criteria.    Last MRI:   < from: MR Head No Cont (04.15.20 @ 09:36) >  FINDINGS:   There is restricted diffusion with corresponding T2/FLAIR hyperintense signal in the right basal ganglia extending to the right frontal corona radiata compatible with infarct.There are also additional foci of restricted diffusion in the right external capsule, in the right periatrial white matter, compatible with lacunar infarcts. There are foci of high signal in the left corona radiata on the B 1000 sequence with corresponding T2 and FLAIR hyperintense signal, but no dark signal on the ADC map, compatible with subacute infarcts. There are chronic right gangliocapsular infarcts. Scattered periventricular punctate T2 FLAIR hyperintensities consistent with microangiopathy.    Ventricles and sulci are normal in size and configuration. No extra axial collection. No mass effect or midline shift.  No evidence of hemorrhage on the gradient echo sequence. The major vessel flow voids are preserved at the skull base.  Cerebellar tonsils are in normal location.      No gross orbital mass. Mild mucosal thickening in bilateral ethmoid air cells and right frontal and left maxillary sinuses. Mastoid air cells are normal in signal. Bone marrow signal is unremarkable.    IMPRESSION: Bilateral focal/lacunar acute and subacute infarcts as detailed above compatible with cardioembolic infarcts. No hemorrhagic transformation.    Last TCD: NONE    Last EEG: NONE      9. Cardiovascular:   Vital Signs Last 24 Hrs  T(C): 35.6 (2020 11:34), Max: 36.4 (2020 21:09)  T(F): 96 (2020 11:34), Max: 97.5 (2020 21:09)  HR: 81 (2020 11:34) (77 - 90)  BP: 132/80 (2020 11:34) (132/80 - 184/89)  BP(mean): --  RR: 18 (2020 11:34) (16 - 18)  SpO2: 100% (2020 11:34) (97% - 100%)    Last Echo:   < from: Transthoracic Echocardiogram (04.15.20 @ 09:54) >  EXAM:  2-D ECHO (TTE) COMPLETE    PROCEDURE DATE:  04/15/2020      INTERPRETATION:  REPORT:    TRANSTHORACIC ECHOCARDIOGRAM REPORT      Patient Name:   TARUN DEL TORO Accession #: [id number]  Medical Rec #:  [id number]       Height:      62.0 in 157.5 cm  YOB: 1970      Weight:      68.0 lb 30.84 kg  Patient Age:    49 years       BSA:         1.21 m²  Patient Gender: M              BP:          190/98 mmHg       Date of Exam:        4/15/2020 9:54:22 AM  Referring Physician: [id number] ED UNASSIGNED  Sonographer:         Adela Coe  Reading Physician:   Gus Butler M.D.    Procedure:   2D Echo/Doppler/Color Doppler Complete and Saline Contrast (Bubble               Study).  Indications: I69.998 - Other Sequelae following unspecified Cerebrovascular D  Diagnosis:   Other forms of dyspnea - R06.09    Summary:   1. LV Ejection Fraction by Ortiz's Method with a biplane EF of 57 %.   2. Moderately increased LV wall thickness.   3. Spectral Doppler shows impaired relaxation pattern of left ventricular myocardial filling (Grade I diastolic dysfunction).   4. Mild mitral valve regurgitation.   5. Color flow doppler and intravenous injection of agitated saline demonstrates the presence of an intact intra atrial septum.    PHYSICIAN INTERPRETATION:  Left Ventricle: The left ventricular internal cavity size is normal. Left ventricular wall thickness is moderately increased. Spectral Doppler shows impaired relaxation pattern of left ventricular myocardial filling (Grade I diastolic dysfunction).  Right Ventricle: Normal right ventricular size and function.  Left Atrium: Mildly enlarged left atrium. Color flow doppler and intravenous injection of agitated saline demonstrates the presence of an intact intra atrial septum.  Right Atrium: Normal right atrial size.  Pericardium: There is no evidence of pericardial effusion.  Mitral Valve: No evidence of mitral stenosis. Mild mitral valve regurgitation is seen.  Tricuspid Valve: Mild tricuspid regurgitation isvisualized.  Aortic Valve: No evidence of aortic stenosis. Aortic valve thickening with normal leaflet opening. Trivial aortic valve regurgitation is seen.  Pulmonic Valve: Mild pulmonic valve regurgitation.  Aorta: The aortic root is normal in size and structure.  Venous: The inferior vena cava was normal sized, with respiratory size variation greater than 50%.  Shunts: Agitated saline contrast was given intravenously to evaluate for intracardiac shunting.       2D AND M-MODE MEASUREMENTS (normal ranges within parentheses):  Left                  Normal   Aorta/Left             Normal  Ventricle:                     Atrium:  IVSd (2D):  1.43 cm  (0.7-1.1) AoV Cusp       1.87  (1.5-2.6)  LVPWd (2D): 1.38 cm  (0.7-1.1) Separation:     cm  LVIDd (2D): 4.12 cm  (3.4-5.7) Left Atrium    3.86  (1.9-4.0)  LVIDs (2D): 2.77 cm            (Mmode):        cm  LV FS (2D):  32.8 %   (>25%)   LA Volume      34.3  IVSd        1.48 cm  (0.7-1.1) Index         ml/m²  (Mmode):                       Right  LVPWd       1.08 cm  (0.7-1.1) Ventricle:  (Mmode):                       RVd (Mmode):   1.44 cm  LVIDd       4.48 cm  (3.4-5.7) RVd (2D):      2.31 cm  (Mmode):  LVIDs       3.20 cm  (Mmode):  LV FS        28.6 %   (>25%)  (Mmode):  Relative      0.67    (<0.42)  Wall  Thickness  Rel. Wall     0.48    (<0.42)  Thickness  Mm  LV Mass      179.4  Index:        g/m²  Mmode    SPECTRAL DOPPLER ANALYSIS:  LV DIASTOLIC FUNCTION:  MV Peak E: 0.51 m/s Decel Time: 247 msec  MV Peak A: 0.79 m/s  E/A Ratio: 0.64    LVOT Vmax: 0.65 m/s  LVOT VTI:  0.15 m  LVOT Diam: 2.27 cm    Mitral Valve:  MV P1/2 Time: 71.55 msec  MV Area, PHT: 3.07 cm²    Tricuspid Valve and PA/RV Systolic Pressure: TR Max Velocity: 0.92 m/s RA Pressure: 3 mmHg RVSP/PASP: 6.4 mmHg    Pulmonic Valve:  PV Max Velocity: 1.06 m/s PV Max P.5 mmHg PV Mean PG:    Last EKG:  < from: 12 Lead ECG (20 @ 16:59) >  Ventricular Rate 83 BPM    Atrial Rate 83 BPM    P-R Interval 174 ms    QRS Duration 90 ms    Q-T Interval 382 ms    QTC Calculation(Bezet) 448 ms    P Axis 40 degrees    R Axis 79 degrees    T Axis 27 degrees    Diagnosis Line Normal sinus rhythm  Normal ECG    < end of copied text >      SBP target: >130    Enzymes/Trop:  Troponin T, Serum (20 @ 17:07)    Troponin T, Serum: 0.06: Critical value: ng/mL      10. Respiratory:     Chest Xray:  < from: Xray Chest 1 View- PORTABLE-Routine (20 @ 10:45) >  IMPRESSION:      No radiographic evidence of acute cardiopulmonary disease.    < end of copied text >      11.GI:    Prophyaxis: Protonix 40 mg daily  Bowel mvt: yes    Abd distension: No  LIVER FUNCTIONS - ( 2020 05:39 )  Alb: 3.4 g/dL / Pro: 5.7 g/dL / ALK PHOS: 68 U/L / ALT: 19 U/L / AST: 29 U/L / GGT: x                  12.Renal/Fluids/Electrolytes:        143  |  109  |  44<H>  ----------------------------<  78  4.6   |  20  |  5.1<HH>    Ca    8.6      2020 05:39  Phos  4.9     04-17  Mg     1.9     04-16    TPro  5.7<L>  /  Alb  3.4<L>  /  TBili  0.5  /  DBili  x   /  AST  29  /  ALT  19  /  AlkPhos  68            13.ID:   Vital Signs Last 24 Hrs  T(C): 35.6 (2020 11:34), Max: 36.4 (2020 21:09)  T(F): 96 (2020 11:34), Max: 97.5 (2020 21:09)  HR: 81 (2020 11:34) (77 - 90)  BP: 132/80 (2020 11:34) (132/80 - 184/89)  BP(mean): --  RR: 18 (2020 11:34) (16 - 18)  SpO2: 100% (2020 11:34) (97% - 100%)        Lines: Central[] Date inserted: Peripheral[x]    14. Hematology:                         14.0   9.11  )-----------( 247      ( 2020 05:39 )             41.8     -17    143  |  109  |  44<H>  ----------------------------<  78  4.6   |  20  |  5.1<HH>    Ca    8.6      2020 05:39  Phos  4.9     04-17  Mg     1.9     04-16    TPro  5.7<L>  /  Alb  3.4<L>  /  TBili  0.5  /  DBili  x   /  AST  29  /  ALT  19  /  AlkPhos  68  -17      DVT Prophylaxis Lovenox[ ] Heparin[ x ] Venodynes[ ] SCD's[ ]

## 2020-04-18 NOTE — PROGRESS NOTE ADULT - ASSESSMENT
15. Impression:  A 49 years old right handed man with uncontrolled DM (A1C 9.2), DLD, RAE, Aflutter who presents with slurred speech, left sided facial droop, and left sided hemiparesis, body aches and chills. In ED NIHSS 4, CTH was negative for hemorrhage, patient not tPA candidate as outside window. Patient with hypertensive urgency 220/110 and was started on Cardene drip but discontinued per Neuro d/t worsening left sided weakness. On brain MRI he is found to have acute right lacunar infarcts in R basal ganglia, L subacute lacunar infarct, R posterior commissure infarct, corresponding to both anterior and posterior circulation.   CT abd/pelvis showed Left UPJ obstruction of unclear chronicity - atrophic left kidney with severe hydronephrosis to the UPJ. No obstructions.  Of note he is with history of Aflutter and risk factors including DM, HTN, cannot exclude possible etiology of atypical embolic source and therefore further work up is warranted. Of note, patient had COVID PCR negative, remains afebrile but he is with persistent cough.     SUGGESTIONS:    Neuro:  - Continue DAPT  - Continue statin  - f/u hypercoagulable work up  - Serial neuro checks  - PT/OT     Cardiac:  - Continue Nifedipine  - Continue Lisinopril bid  - f/u BP measurements    Pulmonary:  - Continue HS CPAP for RAE  - Incentive spirometry    GI:  - No active issue    Renal/:  - Outpatient work up for left hydronephrosis  - Strict I&O, daily wt  - f/u renal artery US    Endo:  - Continue Lantus 16 untis HS and Lispro 4 units with each meal  - Appreciate endocrinology       17. Disposition:  -to home pending medical stabilization

## 2020-04-18 NOTE — PROGRESS NOTE ADULT - SUBJECTIVE AND OBJECTIVE BOX
Follow-up re glycemic control.      No new clinical issues.  Nephrology note revewed.  Pt on 16 units Lantus insulin aHS plus 4 units lisproo a.c.      O:  CAPILLARY BLOOD GLUCOSE  POCT Blood Glucose.: 99 mg/dL (18 Apr 2020 16:11)  POCT Blood Glucose.: 121 mg/dL (18 Apr 2020 11:31)  POCT Blood Glucose.: 81 mg/dL (18 Apr 2020 07:46)  POCT Blood Glucose.: 112 mg/dL (17 Apr 2020 20:33)    Basic Metabolic Panel (04.14.20 @ 16:00)    Sodium, Serum: 143 mmol/L    Potassium, Serum: 4.4 mmol/L    Chloride, Serum: 112 mmol/L    Carbon Dioxide, Serum: 19 mmol/L    Anion Gap, Serum: 12 mmol/L    Blood Urea Nitrogen, Serum: 41 mg/dL    Creatinine, Serum: 4.0 mg/dL    Glucose, Serum: 125 mg/dL    Calcium, Total Serum: 7.8 mg/dL    eGFR if Non : 17     Comprehensive Metabolic Panel in AM (04.18.20 @ 08:07)    Sodium, Serum: 141 mmol/L    Potassium, Serum: 4.5 mmol/L    Chloride, Serum: 107 mmol/L    Carbon Dioxide, Serum: 20 mmol/L    Anion Gap, Serum: 14 mmol/L    Blood Urea Nitrogen, Serum: 44 mg/dL    Creatinine, Serum: 5.0: Critical value: mg/dL    Glucose, Serum: 96 mg/dL    Calcium, Total Serum: 8.9 mg/dL    Protein Total, Serum: 6.3 g/dL    Albumin, Serum: 3.7 g/dL    Bilirubin Total, Serum: 0.6 mg/dL    Alkaline Phosphatase, Serum: 76 U/L    Aspartate Aminotransferase (AST/SGOT): 71 U/L    Alanine Aminotransferase (ALT/SGPT): 44 U/L    eGFR if Non : 13 Follow-up re glycemic control.      No new clinical issues.  Nephrology note revewed.  Pt on 16 units Lantus insulin qHS plus 4 units lispro insulin a.c.      O:  CAPILLARY BLOOD GLUCOSE  POCT Blood Glucose.: 99 mg/dL (18 Apr 2020 16:11)  POCT Blood Glucose.: 121 mg/dL (18 Apr 2020 11:31)  POCT Blood Glucose.: 81 mg/dL (18 Apr 2020 07:46)  POCT Blood Glucose.: 112 mg/dL (17 Apr 2020 20:33)    Basic Metabolic Panel (04.14.20 @ 16:00)    Sodium, Serum: 143 mmol/L    Potassium, Serum: 4.4 mmol/L    Chloride, Serum: 112 mmol/L    Carbon Dioxide, Serum: 19 mmol/L    Anion Gap, Serum: 12 mmol/L    Blood Urea Nitrogen, Serum: 41 mg/dL    Creatinine, Serum: 4.0 mg/dL    Glucose, Serum: 125 mg/dL    Calcium, Total Serum: 7.8 mg/dL    eGFR if Non : 17     Comprehensive Metabolic Panel in AM (04.18.20 @ 08:07)    Sodium, Serum: 141 mmol/L    Potassium, Serum: 4.5 mmol/L    Chloride, Serum: 107 mmol/L    Carbon Dioxide, Serum: 20 mmol/L    Anion Gap, Serum: 14 mmol/L    Blood Urea Nitrogen, Serum: 44 mg/dL    Creatinine, Serum: 5.0: Critical value: mg/dL    Glucose, Serum: 96 mg/dL    Calcium, Total Serum: 8.9 mg/dL    Protein Total, Serum: 6.3 g/dL    Albumin, Serum: 3.7 g/dL    Bilirubin Total, Serum: 0.6 mg/dL    Alkaline Phosphatase, Serum: 76 U/L    Aspartate Aminotransferase (AST/SGOT): 71 U/L    Alanine Aminotransferase (ALT/SGPT): 44 U/L    eGFR if Non : 13

## 2020-04-18 NOTE — PROGRESS NOTE ADULT - SUBJECTIVE AND OBJECTIVE BOX
seen and examined  24 h  events noted         PAST HISTORY  --------------------------------------------------------------------------------  No significant changes to PMH, PSH, FHx, SHx, unless otherwise noted    ALLERGIES & MEDICATIONS  --------------------------------------------------------------------------------  Allergies    Allergy Status Unknown    Intolerances      Standing Inpatient Medications  aspirin enteric coated 162 milliGRAM(s) Oral daily  atorvastatin 80 milliGRAM(s) Oral at bedtime  chlorhexidine 4% Liquid 1 Application(s) Topical <User Schedule>  clopidogrel Tablet 75 milliGRAM(s) Oral daily  dextrose 5%. 1000 milliLiter(s) IV Continuous <Continuous>  dextrose 50% Injectable 12.5 Gram(s) IV Push once  dextrose 50% Injectable 25 Gram(s) IV Push once  dextrose 50% Injectable 25 Gram(s) IV Push once  heparin  Injectable 5000 Unit(s) SubCutaneous every 8 hours  insulin glargine Injectable (LANTUS) 16 Unit(s) SubCutaneous at bedtime  insulin lispro Injectable (HumaLOG) 4 Unit(s) SubCutaneous before breakfast  insulin lispro Injectable (HumaLOG) 4 Unit(s) SubCutaneous before lunch  insulin lispro Injectable (HumaLOG) 4 Unit(s) SubCutaneous before dinner  lisinopril 10 milliGRAM(s) Oral two times a day  NIFEdipine XL 30 milliGRAM(s) Oral every 12 hours  pantoprazole    Tablet 40 milliGRAM(s) Oral before breakfast  senna 1 Tablet(s) Oral daily    PRN Inpatient Medications  acetaminophen   Tablet .. 650 milliGRAM(s) Oral every 6 hours PRN  dextrose 40% Gel 15 Gram(s) Oral once PRN  glucagon  Injectable 1 milliGRAM(s) IntraMuscular once PRN  labetalol Injectable 10 milliGRAM(s) IV Push every 6 hours PRN          VITALS/PHYSICAL EXAM  --------------------------------------------------------------------------------  T(C): 35.8 (04-18-20 @ 05:41), Max: 36.2 (04-17-20 @ 07:54)  HR: 69 (04-18-20 @ 06:30) (63 - 90)  BP: 173/97 (04-18-20 @ 06:30) (127/73 - 200/105)  RR: 18 (04-18-20 @ 06:30) (16 - 18)  SpO2: 100% (04-17-20 @ 14:55) (97% - 100%)  Wt(kg): --        Physical Exam:  	Gen: on BIPAP  	Pulm:decrease BS  B/L  	CV:  S1S2; no rub  	Abd: +distended  	    LABS/STUDIES  --------------------------------------------------------------------------------              14.0   9.11  >-----------<  247      [04-17-20 @ 05:39]              41.8     143  |  109  |  44  ----------------------------<  78      [04-17-20 @ 05:39]  4.6   |  20  |  5.1        Ca     8.6     [04-17-20 @ 05:39]      Phos  4.9     [04-17-20 @ 05:39]    TPro  5.7  /  Alb  3.4  /  TBili  0.5  /  DBili  x   /  AST  29  /  ALT  19  /  AlkPhos  68  [04-17-20 @ 05:39]          Creatinine Trend:  SCr 5.1 [04-17 @ 05:39]  SCr 5.1 [04-16 @ 05:26]  SCr 4.0 [04-14 @ 16:00]  SCr 4.4 [04-14 @ 06:00]  SCr 4.2 [04-13 @ 10:20]    Urinalysis - [04-13-20 @ 22:50]      Color Yellow / Appearance Clear / SG 1.036 / pH 6.5      Gluc 500 mg/dL / Ketone Trace  / Bili Negative / Urobili <2 mg/dL       Blood Moderate / Protein >600 mg/dL / Leuk Est Negative / Nitrite Negative      RBC 3 / WBC 4 / Hyaline 3 / Gran Few / Sq Epi  / Non Sq Epi 3 / Bacteria Negative    Urine Creatinine 148      [04-15-20 @ 11:40]  Urine Sodium 82.0      [04-15-20 @ 11:40]  Urine Potassium 58      [04-15-20 @ 11:40]  Urine Chloride 79      [04-15-20 @ 11:40]    Ferritin 739      [04-15-20 @ 20:14]  PTH -- (Ca 8.6)      [04-17-20 @ 05:39]   231  HbA1c 9.2      [04-13-20 @ 16:00]  TSH 3.75      [04-13-20 @ 16:00]  Lipid: chol 266, , HDL 35,       [04-14-20 @ 06:00]      KENYON: titer Negative, pattern --      [04-14-20 @ 12:35] seen and examined  24 h  events noted         PAST HISTORY  --------------------------------------------------------------------------------  No significant changes to PMH, PSH, FHx, SHx, unless otherwise noted    ALLERGIES & MEDICATIONS  --------------------------------------------------------------------------------  Allergies    Allergy Status Unknown    Intolerances      Standing Inpatient Medications  aspirin enteric coated 162 milliGRAM(s) Oral daily  atorvastatin 80 milliGRAM(s) Oral at bedtime  chlorhexidine 4% Liquid 1 Application(s) Topical <User Schedule>  clopidogrel Tablet 75 milliGRAM(s) Oral daily  dextrose 5%. 1000 milliLiter(s) IV Continuous <Continuous>  dextrose 50% Injectable 12.5 Gram(s) IV Push once  dextrose 50% Injectable 25 Gram(s) IV Push once  dextrose 50% Injectable 25 Gram(s) IV Push once  heparin  Injectable 5000 Unit(s) SubCutaneous every 8 hours  insulin glargine Injectable (LANTUS) 16 Unit(s) SubCutaneous at bedtime  insulin lispro Injectable (HumaLOG) 4 Unit(s) SubCutaneous before breakfast  insulin lispro Injectable (HumaLOG) 4 Unit(s) SubCutaneous before lunch  insulin lispro Injectable (HumaLOG) 4 Unit(s) SubCutaneous before dinner  lisinopril 10 milliGRAM(s) Oral two times a day  NIFEdipine XL 30 milliGRAM(s) Oral every 12 hours  pantoprazole    Tablet 40 milliGRAM(s) Oral before breakfast  senna 1 Tablet(s) Oral daily    PRN Inpatient Medications  acetaminophen   Tablet .. 650 milliGRAM(s) Oral every 6 hours PRN  dextrose 40% Gel 15 Gram(s) Oral once PRN  glucagon  Injectable 1 milliGRAM(s) IntraMuscular once PRN  labetalol Injectable 10 milliGRAM(s) IV Push every 6 hours PRN  < from: CT Abdomen and Pelvis No Cont (04.15.20 @ 13:48) >  1.  Left UPJ obstruction of unclear chronicity - atrophic left kidney with severe hydronephrosis to the UPJ. No obstructing calculus.   2.  Nonobstructing bilateral renal calculi.  3.  Vicarious excretion of contrast in this patient with history of acute kidney injury.    < end of copied text >          VITALS/PHYSICAL EXAM  --------------------------------------------------------------------------------  T(C): 35.8 (04-18-20 @ 05:41), Max: 36.2 (04-17-20 @ 07:54)  HR: 69 (04-18-20 @ 06:30) (63 - 90)  BP: 173/97 (04-18-20 @ 06:30) (127/73 - 200/105)  RR: 18 (04-18-20 @ 06:30) (16 - 18)  SpO2: 100% (04-17-20 @ 14:55) (97% - 100%)  Wt(kg): --        Physical Exam:  	Gen: on BIPAP  	Pulm:decrease BS  B/L  	CV:  S1S2; no rub  	Abd: +distended  	    LABS/STUDIES  --------------------------------------------------------------------------------              14.0   9.11  >-----------<  247      [04-17-20 @ 05:39]              41.8     143  |  109  |  44  ----------------------------<  78      [04-17-20 @ 05:39]  4.6   |  20  |  5.1        Ca     8.6     [04-17-20 @ 05:39]      Phos  4.9     [04-17-20 @ 05:39]    TPro  5.7  /  Alb  3.4  /  TBili  0.5  /  DBili  x   /  AST  29  /  ALT  19  /  AlkPhos  68  [04-17-20 @ 05:39]          Creatinine Trend:  SCr 5.1 [04-17 @ 05:39]  SCr 5.1 [04-16 @ 05:26]  SCr 4.0 [04-14 @ 16:00]  SCr 4.4 [04-14 @ 06:00]  SCr 4.2 [04-13 @ 10:20]    Urinalysis - [04-13-20 @ 22:50]      Color Yellow / Appearance Clear / SG 1.036 / pH 6.5      Gluc 500 mg/dL / Ketone Trace  / Bili Negative / Urobili <2 mg/dL       Blood Moderate / Protein >600 mg/dL / Leuk Est Negative / Nitrite Negative      RBC 3 / WBC 4 / Hyaline 3 / Gran Few / Sq Epi  / Non Sq Epi 3 / Bacteria Negative    Urine Creatinine 148      [04-15-20 @ 11:40]  Urine Sodium 82.0      [04-15-20 @ 11:40]  Urine Potassium 58      [04-15-20 @ 11:40]  Urine Chloride 79      [04-15-20 @ 11:40]    Ferritin 739      [04-15-20 @ 20:14]  PTH -- (Ca 8.6)      [04-17-20 @ 05:39]   231  HbA1c 9.2      [04-13-20 @ 16:00]  TSH 3.75      [04-13-20 @ 16:00]  Lipid: chol 266, , HDL 35,       [04-14-20 @ 06:00]      KENYON: titer Negative, pattern --      [04-14-20 @ 12:35]

## 2020-04-18 NOTE — PROGRESS NOTE ADULT - ATTENDING COMMENTS
type 2 diabetes, can be managed by diet and exercise, but if needs meds can try glp-1 agonist therapy as an outpatient as most of them are not renally metabolized, and can help lose weight as this is the key.
PT seen and examined  above note reviewed  needs UA urine prot/cr ratio  renal US   Hgb surprisingly good for  this all being chronic  check KENYON ANca AntiGBM  C3 C4  check PTH, phos vit D  will likely need Renal bx, complicated by recent  start  for acute stroke    will follow  no indication for HD
Patient examined this am at which time SBP was slightly above 150.  He was resting comfortably. Mild dysarthria was present in addition to moderate left facial droop yielding an NIH score of 3.      Overnight elevated, then dropped low following additional doses of antihypertensives so given NS bolus.      Impression:  1.  Multifocal strokes raising concern for cardioembolism.  No PFO on bubble study.  Current neurologic exam stable.  2.  Difficult to control hypertension will likely need further adjustment of medications.    Recommend:  1.  Lisinopril increase to 10 mg q12 hours 12 and midnight.  2.  nifedipine XR 30 mg q12 hours at 6 am and 6 p.m.  May increase from 60 mg/day to 90 mg/day according to renal if further adjustment needed.  3.  Labetolol 10 mg IVP q6 prn SBP > 150 mm Hg.  4.  Given 500 cc NS x 1 if SBP < 130.  5.  Continue dual antiplatelet therapy but once BP is under improved control can switch aspirin/plavix to renal dose of Eliqui 2.5 mg bid to lower risk for additional strokes given patient hx of atrial flutter, DM, obesity, HTN, and recent stroke.
Pt left sided weakness worsened when placed on low dose cardene so it was discontinued.   Recommend aspirin 162 mg /day and atorvastatin 80 mg/day in addition to DVT prophylaxis, PT, OT, Speech.  If rules out for Covid-19 then may go to stroke unit.  Telemetry and echocardiogram are recommended.
A 49 years old right handed man with uncontrolled DM (A1C 9.2), DLD, RAE, Aflutter, and severe left hydronephrosis presented with slurred speech, left sided facial droop, and left sided hemiparesis, body aches and chills when he was found to have acute ischemic infarction in the right MCA and tip of basilar branches territory, likely due to embolic event. This event can be due to complicated chronic Atrial Flutter.  Also medically his BP is very labile as well as his blood sugar, being on medications in trying to control them. For his hydronephrosis he is being seen and managed by Nephrology, who recommended no intervention at this point and f/up as an out patient.  Also for his diabetes control Endocrinology was consulted and their recommendations is followed.  In regard to stroke prevention he benefits more from anticoagulation, however this will be safe once his BP is better controlled (currently fluctuating between low 100s to about 200 SBP).
A 49 years old right handed man with uncontrolled DM (A1C 9.2), DLD, RAE, Aflutter, and severe left hydronephrosis presented with slurred speech, left sided facial droop, and left sided hemiparesis, body aches and chills when he was found to have acute ischemic infarction in the right MCA and tip of basilar branches territory, likely due to embolic event. This event can be due to complicated chronic Atrial Flutter.  Also medically his BP is very labile as well as his blood sugar, being on medications in trying to control them. For his hydronephrosis he is being seen and managed by Nephrology, who recommended no intervention at this point and f/up as an out patient.  Also for his diabetes control Endocrinology was consulted and their recommendations is followed.  In regard to stroke prevention he benefits more from anticoagulation, however this will be safe once his BP is better controlled (currently fluctuating between low 100s to about 200 SBP). Prelim hypercoagulable work up negative. His COVID PCR negative.
Seen and examined with the pulmonary fellow at the bed side.  Impression and plan as outlined above.
A 49 years old right handed man with uncontrolled DM (A1C 9.2), DLD, RAE, Aflutter, and severe left hydronephrosis presented with slurred speech, left sided facial droop, and left sided hemiparesis, body aches and chills when he was found to have acute ischemic infarction in the right MCA and tip of basilar branches territory, likely due to embolic event. This event can be due to complicated chronic Atrial Flutter.  Also medically his BP is very labile as well as his blood sugar, being on medications in trying to control them. For his hydronephrosis he is being seen and managed by Nephrology, who recommended no intervention at this point and f/up as an out patient.  Also for his diabetes control Endocrinology was consulted and their recommendations is followed.  In regard to stroke prevention he benefits more from anticoagulation, however this will be safe once his BP is better controlled (currently fluctuating between low 100s to about 200 SBP).

## 2020-04-18 NOTE — PROGRESS NOTE ADULT - ASSESSMENT
48 y/o M with JORGE in hospital for slurred speech, left side facial droop, SOB, chills but afebrile, body aches.  PMH DM, RAE, Proteinuria    # JORGE: no baseline creatinine available. last OP labs ~ 5 years ago. poor compliance with meds./ most likely JORGE on CKD obstructive in nature and progressing over time as evidenced by CT findings   # etiology most likely chronic left obstruction with ATN now   # CT noted, followed by , lasix scan   # no CRISTÓBAL   # ph at [facility], pth noted start calcitriol 0.25 q 24  # check pr/cr ratio  # h/h at goal, no KATARINA   # if BP remains elevated increase nifedipine xl to 90 q 24  # slurred speech, facial drooping, weakness improved, followed by neurology   # no acute indication for RRT   # will follow

## 2020-04-18 NOTE — PROGRESS NOTE ADULT - ASSESSMENT
Glycemic control fairly "tight".  Target range for  inpatient glucose such as this is 140-180i.  Relatively low insulin doses likely related to renal insufficiency.    Suggest: decrease Lantus insulin dose to 12 units qHS, continue lispro at 4 units a.c.  Will follow. Glycemic control fairly "tight".  Target range for  inpatient glucose such as this is 140-180 mg/dL.  Relatively low insulin doses likely related to renal insufficiency.    Suggest: decrease Lantus insulin dose to 12 units qHS, continue lispro at 4 units a.c.  Will follow.

## 2020-04-19 LAB
ALBUMIN SERPL ELPH-MCNC: 3.2 G/DL — LOW (ref 3.5–5.2)
ALP SERPL-CCNC: 74 U/L — SIGNIFICANT CHANGE UP (ref 30–115)
ALT FLD-CCNC: 93 U/L — HIGH (ref 0–41)
ANION GAP SERPL CALC-SCNC: 14 MMOL/L — SIGNIFICANT CHANGE UP (ref 7–14)
AST SERPL-CCNC: 125 U/L — HIGH (ref 0–41)
BILIRUB SERPL-MCNC: 0.4 MG/DL — SIGNIFICANT CHANGE UP (ref 0.2–1.2)
BUN SERPL-MCNC: 44 MG/DL — HIGH (ref 10–20)
CALCIUM SERPL-MCNC: 8.6 MG/DL — SIGNIFICANT CHANGE UP (ref 8.5–10.1)
CHLORIDE SERPL-SCNC: 107 MMOL/L — SIGNIFICANT CHANGE UP (ref 98–110)
CO2 SERPL-SCNC: 19 MMOL/L — SIGNIFICANT CHANGE UP (ref 17–32)
CREAT SERPL-MCNC: 4.7 MG/DL — CRITICAL HIGH (ref 0.7–1.5)
GLUCOSE BLDC GLUCOMTR-MCNC: 76 MG/DL — SIGNIFICANT CHANGE UP (ref 70–99)
GLUCOSE BLDC GLUCOMTR-MCNC: 80 MG/DL — SIGNIFICANT CHANGE UP (ref 70–99)
GLUCOSE BLDC GLUCOMTR-MCNC: 93 MG/DL — SIGNIFICANT CHANGE UP (ref 70–99)
GLUCOSE BLDC GLUCOMTR-MCNC: 99 MG/DL — SIGNIFICANT CHANGE UP (ref 70–99)
GLUCOSE SERPL-MCNC: 92 MG/DL — SIGNIFICANT CHANGE UP (ref 70–99)
POTASSIUM SERPL-MCNC: 4.8 MMOL/L — SIGNIFICANT CHANGE UP (ref 3.5–5)
POTASSIUM SERPL-SCNC: 4.8 MMOL/L — SIGNIFICANT CHANGE UP (ref 3.5–5)
PROT SERPL-MCNC: 5.5 G/DL — LOW (ref 6–8)
SODIUM SERPL-SCNC: 140 MMOL/L — SIGNIFICANT CHANGE UP (ref 135–146)

## 2020-04-19 PROCEDURE — 99232 SBSQ HOSP IP/OBS MODERATE 35: CPT

## 2020-04-19 RX ORDER — SODIUM CHLORIDE 9 MG/ML
500 INJECTION INTRAMUSCULAR; INTRAVENOUS; SUBCUTANEOUS
Refills: 0 | Status: DISCONTINUED | OUTPATIENT
Start: 2020-04-19 | End: 2020-04-22

## 2020-04-19 RX ORDER — INSULIN LISPRO 100/ML
VIAL (ML) SUBCUTANEOUS
Refills: 0 | Status: DISCONTINUED | OUTPATIENT
Start: 2020-04-19 | End: 2020-04-22

## 2020-04-19 RX ORDER — NIFEDIPINE 30 MG
30 TABLET, EXTENDED RELEASE 24 HR ORAL
Refills: 0 | Status: DISCONTINUED | OUTPATIENT
Start: 2020-04-19 | End: 2020-04-22

## 2020-04-19 RX ORDER — NIFEDIPINE 30 MG
60 TABLET, EXTENDED RELEASE 24 HR ORAL
Refills: 0 | Status: DISCONTINUED | OUTPATIENT
Start: 2020-04-19 | End: 2020-04-22

## 2020-04-19 RX ORDER — NIFEDIPINE 30 MG
60 TABLET, EXTENDED RELEASE 24 HR ORAL ONCE
Refills: 0 | Status: COMPLETED | OUTPATIENT
Start: 2020-04-19 | End: 2020-04-19

## 2020-04-19 RX ORDER — NIFEDIPINE 30 MG
60 TABLET, EXTENDED RELEASE 24 HR ORAL DAILY
Refills: 0 | Status: DISCONTINUED | OUTPATIENT
Start: 2020-04-20 | End: 2020-04-19

## 2020-04-19 RX ORDER — INSULIN GLARGINE 100 [IU]/ML
10 INJECTION, SOLUTION SUBCUTANEOUS AT BEDTIME
Refills: 0 | Status: DISCONTINUED | OUTPATIENT
Start: 2020-04-19 | End: 2020-04-22

## 2020-04-19 RX ORDER — NIFEDIPINE 30 MG
30 TABLET, EXTENDED RELEASE 24 HR ORAL ONCE
Refills: 0 | Status: DISCONTINUED | OUTPATIENT
Start: 2020-04-19 | End: 2020-04-19

## 2020-04-19 RX ORDER — LABETALOL HCL 100 MG
5 TABLET ORAL ONCE
Refills: 0 | Status: COMPLETED | OUTPATIENT
Start: 2020-04-19 | End: 2020-04-19

## 2020-04-19 RX ADMIN — Medication 162 MILLIGRAM(S): at 12:24

## 2020-04-19 RX ADMIN — HEPARIN SODIUM 5000 UNIT(S): 5000 INJECTION INTRAVENOUS; SUBCUTANEOUS at 22:35

## 2020-04-19 RX ADMIN — CHLORHEXIDINE GLUCONATE 1 APPLICATION(S): 213 SOLUTION TOPICAL at 05:48

## 2020-04-19 RX ADMIN — Medication 5 MILLIGRAM(S): at 02:53

## 2020-04-19 RX ADMIN — Medication 10 MILLIGRAM(S): at 12:15

## 2020-04-19 RX ADMIN — LISINOPRIL 10 MILLIGRAM(S): 2.5 TABLET ORAL at 12:20

## 2020-04-19 RX ADMIN — SENNA PLUS 1 TABLET(S): 8.6 TABLET ORAL at 12:21

## 2020-04-19 RX ADMIN — ATORVASTATIN CALCIUM 80 MILLIGRAM(S): 80 TABLET, FILM COATED ORAL at 22:35

## 2020-04-19 RX ADMIN — LISINOPRIL 10 MILLIGRAM(S): 2.5 TABLET ORAL at 22:36

## 2020-04-19 RX ADMIN — CLOPIDOGREL BISULFATE 75 MILLIGRAM(S): 75 TABLET, FILM COATED ORAL at 12:20

## 2020-04-19 RX ADMIN — Medication 60 MILLIGRAM(S): at 18:59

## 2020-04-19 RX ADMIN — HEPARIN SODIUM 5000 UNIT(S): 5000 INJECTION INTRAVENOUS; SUBCUTANEOUS at 05:48

## 2020-04-19 RX ADMIN — Medication 30 MILLIGRAM(S): at 05:48

## 2020-04-19 RX ADMIN — HEPARIN SODIUM 5000 UNIT(S): 5000 INJECTION INTRAVENOUS; SUBCUTANEOUS at 13:56

## 2020-04-19 RX ADMIN — CALCITRIOL 0.25 MICROGRAM(S): 0.5 CAPSULE ORAL at 12:24

## 2020-04-19 RX ADMIN — PANTOPRAZOLE SODIUM 40 MILLIGRAM(S): 20 TABLET, DELAYED RELEASE ORAL at 12:21

## 2020-04-19 NOTE — PROGRESS NOTE ADULT - SUBJECTIVE AND OBJECTIVE BOX
Neurology Progress Note    Interval History:      Patient seen in f/u today for multifocal cerebral infarcts.  He notes increase BP overnight requiring 15 mg labetolol.    Vital Signs Last 24 Hrs  T(C): 36.8 (19 Apr 2020 05:25), Max: 36.8 (19 Apr 2020 05:25)  T(F): 98.3 (19 Apr 2020 05:25), Max: 98.3 (19 Apr 2020 05:25)  HR: 83 (19 Apr 2020 11:17) (68 - 88)  BP: 194/88 (19 Apr 2020 11:17) (160/87 - 194/88)  BP(mean): --  RR: 18 (19 Apr 2020 05:25) (18 - 18)  SpO2: --  Lungs:  CTA bilaterally.  Cor: RRR -M  Abd: soft NTND + BS  Extr: -c/c/e    Neurological Exam:   Mental status: Awake, alert and oriented x3.  Speech, language intact.   Attention/concentration intact.  mild dysarthria, no aphasia.  Fund of knowledge appropriate.    Cranial nerves: Pupils equall, visual fields full, no nystagmus, extraocular muscles intact, V1 through V3 intact bilaterally and symmetric, mild left facial asymmetry, hearing intact, palate elevation symmetric, tongue was midline.  Motor:  MRC grading 5/5 RUE/RLE 4+/5 LUE/LLE.  Normal tone and bulk.  No abnormal movements.    Sensation: Intact to light touch and pain upper and lower extremities.  Coordination: No dysmetria on finger-to-nose and heel-to-shin.      NIH Stroke score = 3 (2 for facial droop, 1 for dysarthria)    MEDICATIONS  (STANDING):  aspirin enteric coated 162 milliGRAM(s) Oral daily  atorvastatin 80 milliGRAM(s) Oral at bedtime  calcitriol   Capsule 0.25 MICROGram(s) Oral daily  chlorhexidine 4% Liquid 1 Application(s) Topical <User Schedule>  clopidogrel Tablet 75 milliGRAM(s) Oral daily  dextrose 5%. 1000 milliLiter(s) (50 mL/Hr) IV Continuous <Continuous>  dextrose 50% Injectable 12.5 Gram(s) IV Push once  dextrose 50% Injectable 25 Gram(s) IV Push once  dextrose 50% Injectable 25 Gram(s) IV Push once  heparin  Injectable 5000 Unit(s) SubCutaneous every 8 hours  insulin glargine Injectable (LANTUS) 10 Unit(s) SubCutaneous at bedtime  insulin lispro (HumaLOG) corrective regimen sliding scale   SubCutaneous three times a day before meals  lisinopril 10 milliGRAM(s) Oral <User Schedule>  NIFEdipine XL 30 milliGRAM(s) Oral once  NIFEdipine XL 60 milliGRAM(s) Oral daily  pantoprazole    Tablet 40 milliGRAM(s) Oral before breakfast  senna 1 Tablet(s) Oral daily    Labs:  CBC Full  -  ( 18 Apr 2020 08:07 )  WBC Count : 6.95 K/uL  RBC Count : 5.50 M/uL  Hemoglobin : 15.2 g/dL  Hematocrit : 46.1 %  Platelet Count - Automated : 252 K/uL  Mean Cell Volume : 83.8 fL  Mean Cell Hemoglobin : 27.6 pg  Mean Cell Hemoglobin Concentration : 33.0 g/dL  Auto Neutrophil # : x  Auto Lymphocyte # : x  Auto Monocyte # : x  Auto Eosinophil # : x  Auto Basophil # : x  Auto Neutrophil % : x  Auto Lymphocyte % : x  Auto Monocyte % : x  Auto Eosinophil % : x  Auto Basophil % : x    04-19    140  |  107  |  44<H>  ----------------------------<  92  4.8   |  19  |  4.7<HH>    Ca    8.6      19 Apr 2020 06:33    TPro  5.5<L>  /  Alb  3.2<L>  /  TBili  0.4  /  DBili  x   /  AST  125<H>  /  ALT  93<H>  /  AlkPhos  74  04-19    LIVER FUNCTIONS - ( 19 Apr 2020 06:33 )  Alb: 3.2 g/dL / Pro: 5.5 g/dL / ALK PHOS: 74 U/L / ALT: 93 U/L / AST: 125 U/L / GGT: x           Hemoglobin A1C, Whole Blood: 9.2: Method: Immunoassay  Lipid Profile in AM (04.14.20 @ 06:00)    Total Cholesterol/HDL Ratio Measurement: 7.6 Ratio    Cholesterol, Serum: 266 mg/dL    Triglycerides, Serum: 200 mg/dL    HDL Cholesterol, Serum: 35: HDL Levels >/= 60 mg/dL are considered beneficial and a "negative" risk  factor.  Effective 08/15/2018: New reference range and interpretive comment. mg/dL    Direct LDL: 199: LDL Cholesterol (mg/dL) --- Interpretive Comment (for adults 18 and over)        Assessment:  This is a 49y Male w/ h/o DM, HTN, RAE, atrial flutter and new bilateral BG strokes.    DM, HT, RAE all suboptimally treated up to hospitalization.      Plan:  1.  For secondary stroke prevention continue dual antiplatelet therapy with aspirin 162 mg/day and clopidogrel 75 mg/day.  However once BP gets under better control can switch him over to eliquis 2.5 mg po bid.  2.  Continue aggressive treatment of hypertension targeting range of 130-150.  To this end will increase evening nifedipine to 60 mg XL even though he had trouble tolerating it in the morning at this dose.  Am nifedipine will remain at 30 mg XL.  If BP drops will need to give saline boluses.  Will continue amlodipine at 10 mg po bid.  3.  DM:  on sliding scale.  4.  Hyperlipidemia:  Continue high intensity statin.  5.  Continue CPAP to treat RAE.  6.  Heart and brain health diet and exercise.

## 2020-04-19 NOTE — CHART NOTE - NSCHARTNOTEFT_GEN_A_CORE
Spoke to Dr. marte, neurology  He would recommened nifedpine XL 30mg at 6am and nifedipine XL 60mg at 6pm for better BP control  Changes made.

## 2020-04-19 NOTE — CHART NOTE - NSCHARTNOTEFT_GEN_A_CORE
Neurology:  Discussed with RN, patients blood pressure is elevated. She made MD aware and Labetalol is to be given.   Examined patient Still L facial asymmetry, LUE/ LLE clumsiness NIHSS 3    Gregor Calvert NP  [phone]

## 2020-04-20 LAB
ALBUMIN SERPL ELPH-MCNC: 3.3 G/DL — LOW (ref 3.5–5.2)
ALP SERPL-CCNC: 96 U/L — SIGNIFICANT CHANGE UP (ref 30–115)
ALT FLD-CCNC: 150 U/L — HIGH (ref 0–41)
ANION GAP SERPL CALC-SCNC: 16 MMOL/L — HIGH (ref 7–14)
AST SERPL-CCNC: 169 U/L — HIGH (ref 0–41)
BILIRUB SERPL-MCNC: 0.3 MG/DL — SIGNIFICANT CHANGE UP (ref 0.2–1.2)
BUN SERPL-MCNC: 43 MG/DL — HIGH (ref 10–20)
CALCIUM SERPL-MCNC: 8.5 MG/DL — SIGNIFICANT CHANGE UP (ref 8.5–10.1)
CHLORIDE SERPL-SCNC: 103 MMOL/L — SIGNIFICANT CHANGE UP (ref 98–110)
CO2 SERPL-SCNC: 20 MMOL/L — SIGNIFICANT CHANGE UP (ref 17–32)
CREAT ?TM UR-MCNC: 104 MG/DL — SIGNIFICANT CHANGE UP
CREAT ?TM UR-MCNC: 24 MG/DL — SIGNIFICANT CHANGE UP
CREAT SERPL-MCNC: 4.5 MG/DL — CRITICAL HIGH (ref 0.7–1.5)
GLUCOSE BLDC GLUCOMTR-MCNC: 102 MG/DL — HIGH (ref 70–99)
GLUCOSE BLDC GLUCOMTR-MCNC: 109 MG/DL — HIGH (ref 70–99)
GLUCOSE BLDC GLUCOMTR-MCNC: 111 MG/DL — HIGH (ref 70–99)
GLUCOSE BLDC GLUCOMTR-MCNC: 85 MG/DL — SIGNIFICANT CHANGE UP (ref 70–99)
GLUCOSE BLDC GLUCOMTR-MCNC: 93 MG/DL — SIGNIFICANT CHANGE UP (ref 70–99)
GLUCOSE SERPL-MCNC: 82 MG/DL — SIGNIFICANT CHANGE UP (ref 70–99)
HCT VFR BLD CALC: 40.9 % — LOW (ref 42–52)
HGB BLD-MCNC: 13.8 G/DL — LOW (ref 14–18)
MCHC RBC-ENTMCNC: 28 PG — SIGNIFICANT CHANGE UP (ref 27–31)
MCHC RBC-ENTMCNC: 33.7 G/DL — SIGNIFICANT CHANGE UP (ref 32–37)
MCV RBC AUTO: 83 FL — SIGNIFICANT CHANGE UP (ref 80–94)
NRBC # BLD: 0 /100 WBCS — SIGNIFICANT CHANGE UP (ref 0–0)
PLATELET # BLD AUTO: 202 K/UL — SIGNIFICANT CHANGE UP (ref 130–400)
POTASSIUM SERPL-MCNC: 4.6 MMOL/L — SIGNIFICANT CHANGE UP (ref 3.5–5)
POTASSIUM SERPL-SCNC: 4.6 MMOL/L — SIGNIFICANT CHANGE UP (ref 3.5–5)
PROT ?TM UR-MCNC: 145 MG/DLG/24H — SIGNIFICANT CHANGE UP
PROT ?TM UR-MCNC: >564 MG/DLG/24H — SIGNIFICANT CHANGE UP
PROT SERPL-MCNC: 5.6 G/DL — LOW (ref 6–8)
PROT/CREAT UR-RTO: 6 RATIO — HIGH (ref 0–0.2)
PROT/CREAT UR-RTO: >5.4 RATIO — HIGH (ref 0–0.2)
RBC # BLD: 4.93 M/UL — SIGNIFICANT CHANGE UP (ref 4.7–6.1)
RBC # FLD: 12.5 % — SIGNIFICANT CHANGE UP (ref 11.5–14.5)
SODIUM SERPL-SCNC: 139 MMOL/L — SIGNIFICANT CHANGE UP (ref 135–146)
WBC # BLD: 6.13 K/UL — SIGNIFICANT CHANGE UP (ref 4.8–10.8)
WBC # FLD AUTO: 6.13 K/UL — SIGNIFICANT CHANGE UP (ref 4.8–10.8)

## 2020-04-20 PROCEDURE — 99232 SBSQ HOSP IP/OBS MODERATE 35: CPT

## 2020-04-20 RX ORDER — LISINOPRIL 2.5 MG/1
10 TABLET ORAL EVERY 12 HOURS
Refills: 0 | Status: DISCONTINUED | OUTPATIENT
Start: 2020-04-20 | End: 2020-04-22

## 2020-04-20 RX ORDER — LABETALOL HCL 100 MG
100 TABLET ORAL EVERY 6 HOURS
Refills: 0 | Status: DISCONTINUED | OUTPATIENT
Start: 2020-04-20 | End: 2020-04-21

## 2020-04-20 RX ORDER — LABETALOL HCL 100 MG
200 TABLET ORAL EVERY 12 HOURS
Refills: 0 | Status: DISCONTINUED | OUTPATIENT
Start: 2020-04-20 | End: 2020-04-20

## 2020-04-20 RX ORDER — LABETALOL HCL 100 MG
100 TABLET ORAL ONCE
Refills: 0 | Status: COMPLETED | OUTPATIENT
Start: 2020-04-20 | End: 2020-04-20

## 2020-04-20 RX ORDER — LABETALOL HCL 100 MG
10 TABLET ORAL ONCE
Refills: 0 | Status: DISCONTINUED | OUTPATIENT
Start: 2020-04-20 | End: 2020-04-20

## 2020-04-20 RX ADMIN — Medication 100 MILLIGRAM(S): at 01:10

## 2020-04-20 RX ADMIN — Medication 100 MILLIGRAM(S): at 23:33

## 2020-04-20 RX ADMIN — Medication 100 MILLIGRAM(S): at 12:46

## 2020-04-20 RX ADMIN — Medication 60 MILLIGRAM(S): at 17:21

## 2020-04-20 RX ADMIN — LISINOPRIL 10 MILLIGRAM(S): 2.5 TABLET ORAL at 12:46

## 2020-04-20 RX ADMIN — HEPARIN SODIUM 5000 UNIT(S): 5000 INJECTION INTRAVENOUS; SUBCUTANEOUS at 21:24

## 2020-04-20 RX ADMIN — Medication 162 MILLIGRAM(S): at 12:46

## 2020-04-20 RX ADMIN — HEPARIN SODIUM 5000 UNIT(S): 5000 INJECTION INTRAVENOUS; SUBCUTANEOUS at 14:12

## 2020-04-20 RX ADMIN — CLOPIDOGREL BISULFATE 75 MILLIGRAM(S): 75 TABLET, FILM COATED ORAL at 12:46

## 2020-04-20 RX ADMIN — ATORVASTATIN CALCIUM 80 MILLIGRAM(S): 80 TABLET, FILM COATED ORAL at 21:24

## 2020-04-20 RX ADMIN — PANTOPRAZOLE SODIUM 40 MILLIGRAM(S): 20 TABLET, DELAYED RELEASE ORAL at 06:27

## 2020-04-20 RX ADMIN — LISINOPRIL 10 MILLIGRAM(S): 2.5 TABLET ORAL at 21:24

## 2020-04-20 RX ADMIN — HEPARIN SODIUM 5000 UNIT(S): 5000 INJECTION INTRAVENOUS; SUBCUTANEOUS at 06:25

## 2020-04-20 RX ADMIN — Medication 30 MILLIGRAM(S): at 06:27

## 2020-04-20 RX ADMIN — CALCITRIOL 0.25 MICROGRAM(S): 0.5 CAPSULE ORAL at 12:46

## 2020-04-20 RX ADMIN — Medication 100 MILLIGRAM(S): at 17:37

## 2020-04-20 RX ADMIN — SENNA PLUS 1 TABLET(S): 8.6 TABLET ORAL at 12:46

## 2020-04-20 NOTE — PROGRESS NOTE ADULT - SUBJECTIVE AND OBJECTIVE BOX
TARUN SARREAL 49y Male  MRN#: [id number]     SUBJECTIVE  Patient is a 49y old Male who presents with a chief complaint of Slurred speech and left facial droop (19 Apr 2020 14:34)  Currently admitted to medicine with the primary diagnosis of Stroke  Today is hospital day 7d, and this morning he is well. Has no issues or complaints    OBJECTIVE  PAST MEDICAL & SURGICAL HISTORY  Proteinuria  RAE (obstructive sleep apnea)  DM (diabetes mellitus)    ALLERGIES:  Allergy Status Unknown    MEDICATIONS:  STANDING MEDICATIONS  aspirin enteric coated 162 milliGRAM(s) Oral daily  atorvastatin 80 milliGRAM(s) Oral at bedtime  calcitriol   Capsule 0.25 MICROGram(s) Oral daily  chlorhexidine 4% Liquid 1 Application(s) Topical <User Schedule>  clopidogrel Tablet 75 milliGRAM(s) Oral daily  dextrose 5%. 1000 milliLiter(s) IV Continuous <Continuous>  dextrose 50% Injectable 12.5 Gram(s) IV Push once  dextrose 50% Injectable 25 Gram(s) IV Push once  dextrose 50% Injectable 25 Gram(s) IV Push once  heparin  Injectable 5000 Unit(s) SubCutaneous every 8 hours  insulin glargine Injectable (LANTUS) 10 Unit(s) SubCutaneous at bedtime  insulin lispro (HumaLOG) corrective regimen sliding scale   SubCutaneous three times a day before meals  lisinopril 10 milliGRAM(s) Oral <User Schedule>  NIFEdipine XL 60 milliGRAM(s) Oral <User Schedule>  NIFEdipine XL 30 milliGRAM(s) Oral <User Schedule>  pantoprazole    Tablet 40 milliGRAM(s) Oral before breakfast  senna 1 Tablet(s) Oral daily    PRN MEDICATIONS  acetaminophen   Tablet .. 650 milliGRAM(s) Oral every 6 hours PRN  dextrose 40% Gel 15 Gram(s) Oral once PRN  glucagon  Injectable 1 milliGRAM(s) IntraMuscular once PRN  labetalol Injectable 10 milliGRAM(s) IV Push every 6 hours PRN  sodium chloride 0.9% Bolus 500 milliLiter(s) IV Bolus <User Schedule> PRN      VITAL SIGNS: Last 24 Hours  T(C): 37.1 (20 Apr 2020 04:05), Max: 37.1 (20 Apr 2020 04:05)  T(F): 98.8 (20 Apr 2020 04:05), Max: 98.8 (20 Apr 2020 04:05)  HR: 84 (20 Apr 2020 06:23) (74 - 86)  BP: 139/71 (20 Apr 2020 06:23) (119/60 - 194/88)  BP(mean): --  RR: 18 (19 Apr 2020 20:15) (18 - 18)  SpO2: 97% (19 Apr 2020 20:05) (97% - 97%)    LABS:                        13.8   6.13  )-----------( 202      ( 20 Apr 2020 05:52 )             40.9     04-19    140  |  107  |  44<H>  ----------------------------<  92  4.8   |  19  |  4.7<HH>    Ca    8.6      19 Apr 2020 06:33    TPro  5.5<L>  /  Alb  3.2<L>  /  TBili  0.4  /  DBili  x   /  AST  125<H>  /  ALT  93<H>  /  AlkPhos  74  04-19    RADIOLOGY:    PHYSICAL EXAM:    GENERAL: NAD, well-developed, AAOx3  HEENT:  Atraumatic, Normocephalic. EOMI, PERRLA, conjunctiva and sclera clear, No JVD  PULMONARY: Clear to auscultation bilaterally; No wheeze  CARDIOVASCULAR: Regular rate and rhythm; No murmurs, rubs, or gallops  GASTROINTESTINAL: Soft, Nontender, Nondistended; Bowel sounds present  MUSCULOSKELETAL:  2+ Peripheral Pulses, No clubbing, cyanosis, or edema  NEUROLOGY: 5/5 RUE strength, 4/5 LUE stregth. 5/5 BLE    SKIN: No rashes or lesions      ASSESSMENT & PLAN  A 49 years old right handed gentleman, with past medical history of poorly controlled DM (A1C 9.2) and not on meds, hypercholesterolemia, DLD, RAE not on CPAP, AFlutter who presents with slurred speech, left sided facial droop, and left sided hemiparesis.     Hospital course  In ED NIHSS 4, CTH was negative for hemorrhage, patient not tPA candidate as outside window.     On brain MRI he is found to have acute right lacunar infarcts in R basal ganglia, L subacute lacunar infarct, R posterior commissure infarct, corresponding to both anterior and posterior circulation.     He was also noted to have a Cr=4.7 on admission with no previous baseline. CT abd/pelvis showed Left UPJ obstruction of unclear chronicity - atrophic left kidney with severe hydronephrosis to the UPJ. No obstructions.    During his hospital stay he was started on aspirin and statin for his stroke. He was also started on medications for appropriate blood pressure control. His weakness improved during his hospital stay. He was seen by s&s.        DVT prophylaxis:   Diet: Diet, Dysphagia 3 Soft-Thin Liquids: renal restrictions  Dispo: fahad  Ambulation: as tolerated TARUN SARREAL 49y Male  MRN#: [id number]     SUBJECTIVE  Patient is a 49y old Male who presents with a chief complaint of Slurred speech and left facial droop (19 Apr 2020 14:34)  Currently admitted to medicine with the primary diagnosis of Stroke  Today is hospital day 7d, and this morning he is well. Has no issues or complaints    OBJECTIVE  PAST MEDICAL & SURGICAL HISTORY  Proteinuria  RAE (obstructive sleep apnea)  DM (diabetes mellitus)    ALLERGIES:  Allergy Status Unknown    MEDICATIONS:  STANDING MEDICATIONS  aspirin enteric coated 162 milliGRAM(s) Oral daily  atorvastatin 80 milliGRAM(s) Oral at bedtime  calcitriol   Capsule 0.25 MICROGram(s) Oral daily  chlorhexidine 4% Liquid 1 Application(s) Topical <User Schedule>  clopidogrel Tablet 75 milliGRAM(s) Oral daily  dextrose 5%. 1000 milliLiter(s) IV Continuous <Continuous>  dextrose 50% Injectable 12.5 Gram(s) IV Push once  dextrose 50% Injectable 25 Gram(s) IV Push once  dextrose 50% Injectable 25 Gram(s) IV Push once  heparin  Injectable 5000 Unit(s) SubCutaneous every 8 hours  insulin glargine Injectable (LANTUS) 10 Unit(s) SubCutaneous at bedtime  insulin lispro (HumaLOG) corrective regimen sliding scale   SubCutaneous three times a day before meals  lisinopril 10 milliGRAM(s) Oral <User Schedule>  NIFEdipine XL 60 milliGRAM(s) Oral <User Schedule>  NIFEdipine XL 30 milliGRAM(s) Oral <User Schedule>  pantoprazole    Tablet 40 milliGRAM(s) Oral before breakfast  senna 1 Tablet(s) Oral daily    PRN MEDICATIONS  acetaminophen   Tablet .. 650 milliGRAM(s) Oral every 6 hours PRN  dextrose 40% Gel 15 Gram(s) Oral once PRN  glucagon  Injectable 1 milliGRAM(s) IntraMuscular once PRN  labetalol Injectable 10 milliGRAM(s) IV Push every 6 hours PRN  sodium chloride 0.9% Bolus 500 milliLiter(s) IV Bolus <User Schedule> PRN      VITAL SIGNS: Last 24 Hours  T(C): 37.1 (20 Apr 2020 04:05), Max: 37.1 (20 Apr 2020 04:05)  T(F): 98.8 (20 Apr 2020 04:05), Max: 98.8 (20 Apr 2020 04:05)  HR: 84 (20 Apr 2020 06:23) (74 - 86)  BP: 139/71 (20 Apr 2020 06:23) (119/60 - 194/88)  BP(mean): --  RR: 18 (19 Apr 2020 20:15) (18 - 18)  SpO2: 97% (19 Apr 2020 20:05) (97% - 97%)    LABS:                        13.8   6.13  )-----------( 202      ( 20 Apr 2020 05:52 )             40.9     04-19    140  |  107  |  44<H>  ----------------------------<  92  4.8   |  19  |  4.7<HH>    Ca    8.6      19 Apr 2020 06:33    TPro  5.5<L>  /  Alb  3.2<L>  /  TBili  0.4  /  DBili  x   /  AST  125<H>  /  ALT  93<H>  /  AlkPhos  74  04-19    RADIOLOGY:    PHYSICAL EXAM:    GENERAL: NAD, well-developed, AAOx3  HEENT:  Atraumatic, Normocephalic. EOMI, PERRLA, conjunctiva and sclera clear, No JVD  PULMONARY: Clear to auscultation bilaterally; No wheeze  CARDIOVASCULAR: Regular rate and rhythm; No murmurs, rubs, or gallops  GASTROINTESTINAL: Soft, Nontender, Nondistended; Bowel sounds present  MUSCULOSKELETAL:  2+ Peripheral Pulses, No clubbing, cyanosis, or edema  NEUROLOGY: 5/5 RUE strength, 4/5 LUE stregth. 5/5 BLE    SKIN: No rashes or lesions      ASSESSMENT & PLAN  A 49 years old right handed gentleman, with past medical history of poorly controlled DM (A1C 9.2) and not on meds, hypercholesterolemia, DLD, RAE not on CPAP, AFlutter who presents with slurred speech, left sided facial droop, and left sided hemiparesis.     Hospital course  In ED NIHSS 4, CTH was negative for hemorrhage, patient not tPA candidate as outside window.     On brain MRI he is found to have acute right lacunar infarcts in R basal ganglia, L subacute lacunar infarct, R posterior commissure infarct, corresponding to both anterior and posterior circulation.     He was also noted to have a Cr=4.7 on admission with no previous baseline. CT abd/pelvis showed Left UPJ obstruction of unclear chronicity - atrophic left kidney with severe hydronephrosis to the UPJ. No obstructions.    During his hospital stay he was started on aspirin and statin for his stroke. He was also started on medications for appropriate blood pressure control. His weakness improved during his hospital stay. He was seen by s&s.    # Acute stroke   Right lacunar infarcts in R basal ganglia, L subacute lacunar infarct, R posterior commissure infarct,  - For secondary stroke prevention continue dual antiplatelet therapy with aspirin 162 mg/day and clopidogrel 75 mg/day.    - However once BP gets under better control can switch him over to eliquis 2.5 mg po bid.  - Continue aggressive treatment of hypertension targeting range of 130-150. Currently on lisinopril 10mg BID, Nifedipine XL 30mg/60mg    # JORGE on CKD III vs CKD III  CT abd/pelvis showed Left UPJ obstruction of unclear chronicity - atrophic left kidney with severe hydronephrosis to the UPJ. No obstructions.  - seen by urology --> no acute intervention   -     # DMII  alk0m=2.2  - on lantus 10 and sliding scale  - FS well controlled  .  # Hyperlipidemia  - Continue high intensity statin.    # RAE  - on CPAP at night    DVT prophylaxis: heparin SQ  Diet: Diet, Dysphagia 3 Soft-Thin Liquids: renal restrictions  Dispo: fahad  Ambulation: as tolerated TARUN SARREAL 49y Male  MRN#: [id number]     SUBJECTIVE  Patient is a 49y old Male who presents with a chief complaint of Slurred speech and left facial droop (19 Apr 2020 14:34)  Currently admitted to medicine with the primary diagnosis of Stroke  Today is hospital day 7d, and this morning he is well. Has no issues or complaints    OBJECTIVE  PAST MEDICAL & SURGICAL HISTORY  Proteinuria  RAE (obstructive sleep apnea)  DM (diabetes mellitus)    ALLERGIES:  Allergy Status Unknown    MEDICATIONS:  STANDING MEDICATIONS  aspirin enteric coated 162 milliGRAM(s) Oral daily  atorvastatin 80 milliGRAM(s) Oral at bedtime  calcitriol   Capsule 0.25 MICROGram(s) Oral daily  chlorhexidine 4% Liquid 1 Application(s) Topical <User Schedule>  clopidogrel Tablet 75 milliGRAM(s) Oral daily  dextrose 5%. 1000 milliLiter(s) IV Continuous <Continuous>  dextrose 50% Injectable 12.5 Gram(s) IV Push once  dextrose 50% Injectable 25 Gram(s) IV Push once  dextrose 50% Injectable 25 Gram(s) IV Push once  heparin  Injectable 5000 Unit(s) SubCutaneous every 8 hours  insulin glargine Injectable (LANTUS) 10 Unit(s) SubCutaneous at bedtime  insulin lispro (HumaLOG) corrective regimen sliding scale   SubCutaneous three times a day before meals  lisinopril 10 milliGRAM(s) Oral <User Schedule>  NIFEdipine XL 60 milliGRAM(s) Oral <User Schedule>  NIFEdipine XL 30 milliGRAM(s) Oral <User Schedule>  pantoprazole    Tablet 40 milliGRAM(s) Oral before breakfast  senna 1 Tablet(s) Oral daily    PRN MEDICATIONS  acetaminophen   Tablet .. 650 milliGRAM(s) Oral every 6 hours PRN  dextrose 40% Gel 15 Gram(s) Oral once PRN  glucagon  Injectable 1 milliGRAM(s) IntraMuscular once PRN  labetalol Injectable 10 milliGRAM(s) IV Push every 6 hours PRN  sodium chloride 0.9% Bolus 500 milliLiter(s) IV Bolus <User Schedule> PRN      VITAL SIGNS: Last 24 Hours  T(C): 37.1 (20 Apr 2020 04:05), Max: 37.1 (20 Apr 2020 04:05)  T(F): 98.8 (20 Apr 2020 04:05), Max: 98.8 (20 Apr 2020 04:05)  HR: 84 (20 Apr 2020 06:23) (74 - 86)  BP: 139/71 (20 Apr 2020 06:23) (119/60 - 194/88)  BP(mean): --  RR: 18 (19 Apr 2020 20:15) (18 - 18)  SpO2: 97% (19 Apr 2020 20:05) (97% - 97%)    LABS:                        13.8   6.13  )-----------( 202      ( 20 Apr 2020 05:52 )             40.9     04-20    139  |  103  |  43<H>  ----------------------------<  82  4.6   |  20  |  x     Ca    8.5      20 Apr 2020 05:52    TPro  5.6<L>  /  Alb  3.3<L>  /  TBili  0.3  /  DBili  x   /  AST  169<H>  /  ALT  150<H>  /  AlkPhos  96  04-20      RADIOLOGY:    PHYSICAL EXAM:    GENERAL: NAD, well-developed, AAOx3  HEENT:  Atraumatic, Normocephalic. EOMI, PERRLA, conjunctiva and sclera clear, No JVD  PULMONARY: Clear to auscultation bilaterally; No wheeze  CARDIOVASCULAR: Regular rate and rhythm; No murmurs, rubs, or gallops  GASTROINTESTINAL: Soft, Nontender, Nondistended; Bowel sounds present  MUSCULOSKELETAL:  2+ Peripheral Pulses, No clubbing, cyanosis, or edema  NEUROLOGY: 5/5 RUE strength, 4/5 LUE stregth. 5/5 BLE    SKIN: No rashes or lesions      ASSESSMENT & PLAN  A 49 years old right handed gentleman, with past medical history of poorly controlled DM (A1C 9.2) and not on meds, hypercholesterolemia, DLD, RAE not on CPAP, AFlutter who presents with slurred speech, left sided facial droop, and left sided hemiparesis.     Hospital course  In ED NIHSS 4, CTH was negative for hemorrhage, patient not tPA candidate as outside window.     On brain MRI he is found to have acute right lacunar infarcts in R basal ganglia, L subacute lacunar infarct, R posterior commissure infarct, corresponding to both anterior and posterior circulation.     He was also noted to have a Cr=4.7 on admission with no previous baseline. CT abd/pelvis showed Left UPJ obstruction of unclear chronicity - atrophic left kidney with severe hydronephrosis to the UPJ. No obstructions.    During his hospital stay he was started on aspirin and statin for his stroke. He was also started on medications for appropriate blood pressure control. His weakness improved during his hospital stay. He was seen by s&s.    # Acute stroke   Right lacunar infarcts in R basal ganglia, L subacute lacunar infarct, R posterior commissure infarct,  - For secondary stroke prevention continue dual antiplatelet therapy with aspirin 162 mg/day and clopidogrel 75 mg/day.    - However once BP gets under better control can switch him over to eliquis 2.5 mg po bid.  - Continue aggressive treatment of hypertension targeting range of 130-150. Currently on lisinopril 10mg BID, Nifedipine XL 30mg/60mg    # JORGE on CKD III vs CKD III  CT abd/pelvis showed Left UPJ obstruction of unclear chronicity - atrophic left kidney with severe hydronephrosis to the UPJ. No obstructions.  - seen by urology --> no acute intervention   -     # DMII  plo6t=4.2  - on lantus 10 and sliding scale  - FS well controlled  .  # Hyperlipidemia  - Continue high intensity statin.    # RAE  - on CPAP at night    DVT prophylaxis: heparin SQ  Diet: Diet, Dysphagia 3 Soft-Thin Liquids: renal restrictions  Dispo: fahad  Ambulation: as tolerated TARUN SARREAL 49y Male  MRN#: [id number]     SUBJECTIVE  Patient is a 49y old Male who presents with a chief complaint of Slurred speech and left facial droop (19 Apr 2020 14:34)  Currently admitted to medicine with the primary diagnosis of Stroke  Today is hospital day 7d, and this morning he is well. Has no issues or complaints    OBJECTIVE  PAST MEDICAL & SURGICAL HISTORY  Proteinuria  RAE (obstructive sleep apnea)  DM (diabetes mellitus)    ALLERGIES:  Allergy Status Unknown    MEDICATIONS:  STANDING MEDICATIONS  aspirin enteric coated 162 milliGRAM(s) Oral daily  atorvastatin 80 milliGRAM(s) Oral at bedtime  calcitriol   Capsule 0.25 MICROGram(s) Oral daily  chlorhexidine 4% Liquid 1 Application(s) Topical <User Schedule>  clopidogrel Tablet 75 milliGRAM(s) Oral daily  dextrose 5%. 1000 milliLiter(s) IV Continuous <Continuous>  dextrose 50% Injectable 12.5 Gram(s) IV Push once  dextrose 50% Injectable 25 Gram(s) IV Push once  dextrose 50% Injectable 25 Gram(s) IV Push once  heparin  Injectable 5000 Unit(s) SubCutaneous every 8 hours  insulin glargine Injectable (LANTUS) 10 Unit(s) SubCutaneous at bedtime  insulin lispro (HumaLOG) corrective regimen sliding scale   SubCutaneous three times a day before meals  lisinopril 10 milliGRAM(s) Oral <User Schedule>  NIFEdipine XL 60 milliGRAM(s) Oral <User Schedule>  NIFEdipine XL 30 milliGRAM(s) Oral <User Schedule>  pantoprazole    Tablet 40 milliGRAM(s) Oral before breakfast  senna 1 Tablet(s) Oral daily    PRN MEDICATIONS  acetaminophen   Tablet .. 650 milliGRAM(s) Oral every 6 hours PRN  dextrose 40% Gel 15 Gram(s) Oral once PRN  glucagon  Injectable 1 milliGRAM(s) IntraMuscular once PRN  labetalol Injectable 10 milliGRAM(s) IV Push every 6 hours PRN  sodium chloride 0.9% Bolus 500 milliLiter(s) IV Bolus <User Schedule> PRN      VITAL SIGNS: Last 24 Hours  T(C): 37.1 (20 Apr 2020 04:05), Max: 37.1 (20 Apr 2020 04:05)  T(F): 98.8 (20 Apr 2020 04:05), Max: 98.8 (20 Apr 2020 04:05)  HR: 84 (20 Apr 2020 06:23) (74 - 86)  BP: 139/71 (20 Apr 2020 06:23) (119/60 - 194/88)  BP(mean): --  RR: 18 (19 Apr 2020 20:15) (18 - 18)  SpO2: 97% (19 Apr 2020 20:05) (97% - 97%)    LABS:                        13.8   6.13  )-----------( 202      ( 20 Apr 2020 05:52 )             40.9     04-20    139  |  103  |  43<H>  ----------------------------<  82  4.6   |  20  |  x     Ca    8.5      20 Apr 2020 05:52    TPro  5.6<L>  /  Alb  3.3<L>  /  TBili  0.3  /  DBili  x   /  AST  169<H>  /  ALT  150<H>  /  AlkPhos  96  04-20      RADIOLOGY:    PHYSICAL EXAM:    GENERAL: NAD, well-developed, AAOx3  HEENT:  Atraumatic, Normocephalic. EOMI, PERRLA, conjunctiva and sclera clear, No JVD  PULMONARY: Clear to auscultation bilaterally; No wheeze  CARDIOVASCULAR: Regular rate and rhythm; No murmurs, rubs, or gallops  GASTROINTESTINAL: Soft, Nontender, Nondistended; Bowel sounds present  MUSCULOSKELETAL:  2+ Peripheral Pulses, No clubbing, cyanosis, or edema  NEUROLOGY: 5/5 RUE strength, 4/5 LUE stregth. 5/5 BLE    SKIN: No rashes or lesions      ASSESSMENT & PLAN  A 49 years old right handed gentleman, with past medical history of poorly controlled DM (A1C 9.2) and not on meds, hypercholesterolemia, DLD, RAE not on CPAP, AFlutter who presents with slurred speech, left sided facial droop, and left sided hemiparesis.     Hospital course  In ED NIHSS 4, CTH was negative for hemorrhage, patient not tPA candidate as outside window.     On brain MRI he is found to have acute right lacunar infarcts in R basal ganglia, L subacute lacunar infarct, R posterior commissure infarct, corresponding to both anterior and posterior circulation.     He was also noted to have a Cr=4.7 on admission with no previous baseline. CT abd/pelvis showed Left UPJ obstruction of unclear chronicity - atrophic left kidney with severe hydronephrosis to the UPJ. No obstructions.    During his hospital stay he was started on aspirin and statin for his stroke. He was also started on medications for appropriate blood pressure control. His weakness improved during his hospital stay. He was seen by s&s.    # Acute stroke   Right lacunar infarcts in R basal ganglia, L subacute lacunar infarct, R posterior commissure infarct,  - For secondary stroke prevention continue dual antiplatelet therapy with aspirin 162 mg/day and clopidogrel 75 mg/day.    - However once BP gets under better control can switch him over to eliquis 2.5 mg po bid.  - Continue aggressive treatment of hypertension targeting range of 130-150. Will d/c lisinopril. Continue nifedipine XL 30/60mg. Start labetalol,     # JORGE on CKD III vs CKD III  CT abd/pelvis showed Left UPJ obstruction of unclear chronicity - atrophic left kidney with severe hydronephrosis to the UPJ. No obstructions.  - seen by urology --> no acute intervention   - lasix scan today to evaluate function of kidneys     # DMII  tgr9x=4.2  - on lantus 10 and sliding scale  - FS well controlled  .  # Hyperlipidemia  - Continue high intensity statin.    # RAE  - on CPAP at night    DVT prophylaxis: heparin SQ  Diet: Diet, Dysphagia 3 Soft-Thin Liquids: renal restrictions  Dispo: fahad  Ambulation: as tolerated

## 2020-04-20 NOTE — PROGRESS NOTE ADULT - NSHPATTENDINGPLANDISCUSS_GEN_ALL_CORE
resident on-call.
OUr Neurovascular Team.
ICU team
our team during multidiciplinary round in the morning and sign out round in the afternoon.
OUR MULTIDICIPLINARY STROKE TEAM DURING MORNING AND SIGN OUT ROUND IN THE AFTERNOON.
our stroke team during multidiciplinary round in the morning and sign out round in the afternoon

## 2020-04-20 NOTE — PROGRESS NOTE ADULT - ASSESSMENT
A 49 years old right handed man with uncontrolled DM (A1C 9.2), DLD, RAE, Aflutter, unclear chronicity of severe left hydronephrosis who presents with slurred speech, left sided facial droop, and left sided hemiparesis, body aches and chills is found to have acute right lacunar infarcts in R basal ganglia, L subacute lacunar infarct, R posterior commissure infarct, corresponding to both anterior and posterior circulation. Etiology likely to be atypical embolic source, and anticoagulation is warranted once BP remain lauren to minimize hemorrhagic conversion. Prelim hypercoagulable work up negative. His COVID PCR negative.     PLAN:  - c/w  mg daily and Plavix 75 mg daily, and high dose statin  - c/w Lisinopril 10 mg PO Q12h (12pm/12am)  - Start labetalol 100 mg PO Q6h   - c/w Nifedipine XL 30 mg QAM (6am) / 60 mg QPM (6pm)  - Notify neuro team if SBP <130  - On d/c insulin lantus and linagliptin 5 mg QD  - Per urology, may f/u outpatient for renal workup of left hydronephrosis  - per Nephrology, start calcitriol 0.25 mg qd, recommend increase nifedipine XL 90 mg daily if BP remain elevated, f/u protein/creat ratio urine, continue renal diet, monitor I/Os, encourage PO hydration  - CPAP at night for RAE    Disposition: Home    Case discussed and patient seen with attending physician   Apple Baer NP  [id number] A 49 years old right handed man with uncontrolled DM (A1C 9.2), DLD, RAE, Aflutter, unclear chronicity of severe left hydronephrosis who presents with slurred speech, left sided facial droop, and left sided hemiparesis, body aches and chills is found to have acute right lacunar infarcts in R basal ganglia, L subacute lacunar infarct, R posterior commissure infarct, corresponding to both anterior and posterior circulation. Etiology likely to be atypical embolic source, and anticoagulation is warranted once BP remain lauren to minimize hemorrhagic conversion. Prelim hypercoagulable work up negative. His COVID PCR negative.     PLAN:  - c/w  mg daily and Plavix 75 mg daily, and high dose statin  - c/w Lisinopril 10 mg PO Q12h (12pm/12am)  - Start labetalol 100 mg PO Q6h   - c/w Nifedipine XL 30 mg QAM (6am) / 60 mg QPM (6pm)  - Notify neuro team if SBP <130  - On d/c insulin lantus and linagliptin 5 mg QD  - Per urology, may f/u outpatient for renal workup of left hydronephrosis  - per Nephrology, start calcitriol 0.25 mg qd, recommend increase nifedipine XL 90 mg daily if BP remain elevated, f/u protein/creat ratio urine, continue renal diet, monitor I/Os, encourage PO hydration  - CPAP at night for RAE    Disposition: Home once medically stable.    Case discussed and patient seen with attending physician   Apple Baer, CADEN  [phone]

## 2020-04-20 NOTE — PROGRESS NOTE ADULT - SUBJECTIVE AND OBJECTIVE BOX
STROKE PROGRESS NOTE:     1. 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    2. Today's Acute Problems: HTN and scattered bilateral hemispheric infarcts (BG, corona radiata) compatible with probable cardioembolism    3. 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 Of note, he also reports of body aches "as if I had the flu," + chills, abdominal discomfort without N/V. He denies chest pain, cough, HA or dysuria. In ED, he was found to have /128, initial NIHSS 4. Initial CT head negative for intracranial bleed and no evidence of stroke. Patient was started on nicardipine drip but it was subsequently d/rodger per neurology request due to worsening left sided weakness and for sudden drop in SBP to ~170s. He was also found to have elevated creatinine at 4 (no baseline).     4-Yesterday's Plan:  -  For secondary stroke prevention continue dual antiplatelet therapy with aspirin 162 mg/day and clopidogrel 75 mg/day.  However once BP gets under better control can switch him over to eliquis 2.5 mg po bid.  - Continue aggressive treatment of hypertension targeting range of 130-150.  To this end will increase evening nifedipine to 60 mg XL even though he had trouble tolerating it in the morning at this dose.  Am nifedipine will remain at 30 mg XL.  If BP drops will need to give saline boluses.  Will continue amlodipine at 10 mg po bid.  - DM:  on sliding scale.  - Hyperlipidemia:  Continue high intensity statin.  - Continue CPAP to treat RAE.  - Heart and brain health diet and exercise.    5. Last 24 hour updates: No new focal neurological complaints, continues to have left facial droop and dysarthria. BP labile despite BP med adjustment.     6. Medications:   aspirin enteric coated 162 milliGRAM(s) Oral daily  atorvastatin 80 milliGRAM(s) Oral at bedtime  calcitriol   Capsule 0.25 MICROGram(s) Oral daily  chlorhexidine 4% Liquid 1 Application(s) Topical <User Schedule>  clopidogrel Tablet 75 milliGRAM(s) Oral daily  dextrose 5%. 1000 milliLiter(s) (50 mL/Hr) IV Continuous <Continuous>  dextrose 50% Injectable 12.5 Gram(s) IV Push once  dextrose 50% Injectable 25 Gram(s) IV Push once  dextrose 50% Injectable 25 Gram(s) IV Push once  heparin  Injectable 5000 Unit(s) SubCutaneous every 8 hours  insulin glargine Injectable (LANTUS) 10 Unit(s) SubCutaneous at bedtime  insulin lispro (HumaLOG) corrective regimen sliding scale   SubCutaneous three times a day before meals  labetalol 100 milliGRAM(s) Oral every 6 hours  lisinopril 10 milliGRAM(s) Oral every 12 hours  NIFEdipine XL 60 milliGRAM(s) Oral <User Schedule>  NIFEdipine XL 30 milliGRAM(s) Oral <User Schedule>  pantoprazole    Tablet 40 milliGRAM(s) Oral before breakfast  senna 1 Tablet(s) Oral daily    7. Ancillary Management:   Chest PT[ ]   Head of bed >35 [ ]   Out of bed to chair [ X]   PT/OT/SP Eval [X ]   Spirometry[ ]   DVT prophalaxis[X ] with Heparin 5000 units SQ q8h    8.Neuro:   Mental status: Awake, alert and oriented x 4. Attention and concentration intact. Fund of knowledge appropriate.  Language: Naming, repetition, fluency, and comprehension intact. + dysarthria, no aphasia.  Cranial nerves: Pupils equally round and reactive to light, visual fields full, no nystagmus, extraocular muscles intact, V1 through V3 intact bilaterally and symmetric, left facial droop    Motor: Normal bulk and tone, strength 5/5 in bilateral upper and lower extremities.   strength 5/5.  Rapid alternating movements intact and symmetric.   Sensation: Intact to light touch, proprioception, and pinprick.  No neglect.   Coordination: No dysmetria on finger-to-nose and heel-to-shin.  No clumsiness.  Reflexes: 2+ in upper and lower extremities, downgoing toes bilaterally  Gait: Narrow and steady. No ataxia.     NIH STROKE SCALE  Item	                                                        Score  1 a.	Level of Consciousness	               	0  1 b. LOC Questions	                                    0  1 c.	LOC Commands	                               	0  2.	Best Gaze	                                    0  3.	Visual	                                                0  4.	Facial Palsy	                                    1  5 a.	Motor Arm - Left	                        0  5 b.	Motor Arm - Right	                        0  6 a.	Motor Leg - Left	                        0  6 b.	Motor Leg - Right	                        0  7.	Limb Ataxia	                                    0  8.	Sensory	                                                0  9.	Language	                                     0  10.	Dysarthria	                                    1  11.	Extinction and Inattention  	            0  ______________________________________________________________________  TOTAL	                                                        2    mRS: baseline 0 ->1 No significant disability despite symptoms; able to carry out all usual duties and activities without assistance    Last CTH:  < from: CT Head No Cont (04.16.20 @ 08:41) >  IMPRESSION:   1. No midline shift, mass effect, or acute intracranial hemorrhage.  2. redemonstration of focal infarcts in bilateral basal ganglia and frontal corona radiata. Refer to MRI brain dated 4/15/2020 for full description of findings.    < end of copied text >  < from: CT Head No Cont (04.13.20 @ 13:14) >  IMPRESSION:   In comparison with the prior noncontrast CT scan of the brain dated April 13, 2020 time 10:27 AM:  Stable examination.  MRI of the brain is suggested for further evaluation not contraindicated in this patient.    < from: CT Brain Stroke Protocol (04.13.20 @ 10:35) >  IMPRESSION:  1.  No acute hemorrhage  2.  No acute territorial infarct  3.  Bilateral relatively symmetric diminished density within the white matter tracts of the corona radiata extending to external capsules. The appearance suggests ischemic change, but does not appear acute    Last CTA/CTP:  < from: CT Perfusion w/ Maps w/ IV Cont (04.13.20 @ 11:00) >  FINDINGS:  Aortic arch: There is a normal branching pattern. The origins of the great vessels are normal. The subclavian arteries are unremarkable.  Right carotid: The common carotid artery is normal. The bifurcation is normally patent. The cervical internal and external carotid arteries are normal.  Left carotid: The common carotid artery is unremarkable. The bifurcation is patent. The cervical internal and external carotid arteries are normal.    Anterior intracranial circulation:    Right ICA: The petrous, cavernous and supraclinoid portions of the ICA are normal. The ophthalmic artery is patent. There is a prominent posterior communicating supply to the posterior cerebral artery with hypoplastic P1. The M1, bifurcation and remaining branches of the middle cerebral artery are patent. The anterior cerebral arteryA1 segment is mildly hypoplastic.    Left ICA: The petrous, cavernous and supraclinoid portions of the ICA are patent. There may be mild narrowing of the para ophthalmic ICA. The ophthalmic artery and small posterior communicating arteries are patent. The middle cerebral artery, trifurcation and branches are patent. The anterior cerebral arteries patent.    The region of the anterior to indicating arteries normal.    Vertebral basilar vessels: The vertebral arteries are patent in the neck. The left vertebral artery is dominant. The right vertebral artery is small and terminates in a posterior inferior cerebellar artery. The left PICA is patent. The basilar artery, superior cerebellar and posterior cerebral arteries are patent.    The visualized brain parenchyma was evaluated on the routine head CT performed at same time.    Other findings: The submandibular and parotid glands are unremarkable. The thyroid gland is unremarkable.    The visualized upper aerodigestive tract is normal.    Therapid perfusion maps do not demonstrate evidence for brain ischemia using the Tmax > 6 sec and CBF < 30% mismatch volume criteria.    Images to the upper thorax demonstrates that the visualized pleural parenchyma and mediastinum are normal.    IMPRESSION:  1.  Patent carotid bifurcations.  2.  No large vessel occlusion  3.  No evidence for ischemia using rapid perfusion maps and the Tmax > 6 sec and CBF < 30% mismatch volume criteria.    Last MRI:   < from: MR Head No Cont (04.15.20 @ 09:36) >  FINDINGS:   There is restricted diffusion with corresponding T2/FLAIR hyperintense signal in the right basal ganglia extending to the right frontal corona radiata compatible with infarct.There are also additional foci of restricted diffusion in the right external capsule, in the right periatrial white matter, compatible with lacunar infarcts. There are foci of high signal in the left corona radiata on the B 1000 sequence with corresponding T2 and FLAIR hyperintense signal, but no dark signal on the ADC map, compatible with subacute infarcts. There are chronic right gangliocapsular infarcts. Scattered periventricular punctate T2 FLAIR hyperintensities consistent with microangiopathy.    Ventricles and sulci are normal in size and configuration. No extra axial collection. No mass effect or midline shift.  No evidence of hemorrhage on the gradient echo sequence. The major vessel flow voids are preserved at the skull base.  Cerebellar tonsils are in normal location.      No gross orbital mass. Mild mucosal thickening in bilateral ethmoid air cells and right frontal and left maxillary sinuses. Mastoid air cells are normal in signal. Bone marrow signal is unremarkable.    IMPRESSION: Bilateral focal/lacunar acute and subacute infarcts as detailed above compatible with cardioembolic infarcts. No hemorrhagic transformation.    Last TCD: NONE    Last EEG: NONE    EVD: [ ] [place]: [ ]     ICP:     CPP:     Level(cm):     24hr(ml):     CSF:     WBC:     RBC:     Cx:     Protein:     Glucose:     LA:        Grain Stain:     9. Cardiovascular:   HR: 85 (04-20-20 @ 10:15) (74 - 86)  BP: 145/85 (04-20-20 @ 10:15) (119/60 - 193/99)    Last Echo:   < from: Transthoracic Echocardiogram (04.15.20 @ 09:54) >  Summary:   1. LV Ejection Fraction by Ortiz's Method with a biplane EF of 57 %.   2. Moderately increased LV wall thickness.   3. Spectral Doppler shows impaired relaxation pattern of left ventricular myocardial filling (Grade I diastolic dysfunction).   4. Mild mitral valve regurgitation.   5. Color flow doppler and intravenous injection of agitated saline demonstrates the presence of an intact intra atrial septum.    PHYSICIAN INTERPRETATION:  Left Ventricle: The left ventricular internal cavity size is normal. Left ventricular wall thickness is moderately increased. Spectral Doppler shows impaired relaxation pattern of left ventricular myocardial filling (Grade I diastolic dysfunction).  Right Ventricle: Normal right ventricular size and function.  Left Atrium: Mildly enlarged left atrium. Color flow doppler and intravenous injection of agitated saline demonstrates the presence of an intact intra atrial septum.  Right Atrium: Normal right atrial size.  Pericardium: There is no evidence of pericardial effusion.  Mitral Valve: No evidence of mitral stenosis. Mild mitral valve regurgitation is seen.  Tricuspid Valve: Mild tricuspid regurgitation isvisualized.  Aortic Valve: No evidence of aortic stenosis. Aortic valve thickening with normal leaflet opening. Trivial aortic valve regurgitation is seen.  Pulmonic Valve: Mild pulmonic valve regurgitation.  Aorta: The aortic root is normal in size and structure.  Venous: The inferior vena cava was normal sized, with respiratory size variation greater than 50%.  Shunts: Agitated saline contrast was given intravenously to evaluate for intracardiac shunting.    Last EKG:  < from: 12 Lead ECG (04.13.20 @ 16:59) >  Ventricular Rate 83 BPM  Atrial Rate 83 BPM  P-R Interval 174 ms  QRS Duration 90 ms  Q-T Interval 382 ms  QTC Calculation(Bezet) 448 ms  P Axis 40 degrees  R Axis 79 degrees  T Axis 27 degrees  Diagnosis Line Normal sinus rhythm  Normal ECG    CVP   MAP/CPP/SBP target:   CO:      CI:       Enzymes/Trop:    10. Respiratory:   RR: 18 (04-20-20 @ 10:15) (18 - 18)  SpO2: 97% (04-19-20 @ 20:05) (97% - 97%)    Chest Xray:  < from: Xray Chest 1 View- PORTABLE-Routine (04.16.20 @ 10:45) >    IMPRESSION:    No radiographic evidence of acute cardiopulmonary disease.    Peak Pressure/Colbert Pressure:    11.GI:  Prophalaxis:  Protonix 40 mg qd   Bowel mvt:  None   Abd distension: None  LIVER FUNCTIONS - ( 20 Apr 2020 05:52 )  Alb: 3.3 g/dL / Pro: 5.6 g/dL / ALK PHOS: 96 U/L / ALT: 150 U/L / AST: 169 U/L / GGT: x             12.Renal/Fluids/Electrolytes:    04-20    139  |  103  |  43<H>  ----------------------------<  82  4.6   |  20  |  4.5<HH>    Ca    8.5      20 Apr 2020 05:52    TPro  5.6<L>  /  Alb  3.3<L>  /  TBili  0.3  /  DBili  x   /  AST  169<H>  /  ALT  150<H>  /  AlkPhos  96  04-20    I&O's Summary    19 Apr 2020 07:01  -  20 Apr 2020 07:00  --------------------------------------------------------  IN: 420 mL / OUT: 200 mL / NET: 220 mL        13.ID:   T(C): 36.6 (20 Apr 2020 10:15), Max: 37.1 (20 Apr 2020 04:05)  T(F): 97.9 (20 Apr 2020 10:15), Max: 98.8 (20 Apr 2020 04:05)  Lines: Central[] Date inserted: Peripheral[X]    Ferritin, Serum (04.15.20 @ 20:14)    Ferritin, Serum: 739 ng/mL    Lactate Dehydrogenase, Serum (04.15.20 @ 20:14)    Lactate Dehydrogenase, Serum: 259 U/L    D-Dimer Assay, Quantitative (04.15.20 @ 20:14)    D-Dimer Assay, Quantitative: 266 ng/mL DDU    C-Reactive Protein, Serum (04.13.20 @ 16:00)    C-Reactive Protein, Serum: 0.20 mg/dL    14. Hematology:                         13.8   6.13  )-----------( 202      ( 20 Apr 2020 05:52 )             40.9     Activated Protein C Resistance Assay (04.15.20 @ 19:00)    Activated Protein C Resistance Ratio: 2.95: APC  resistance ratio of <2.20  will be tested for Factor V  Leiden gene  mutation.  APC  resistance ratio of >=2.20 indicates no resistance to  activated Protein C. The presence of Refludan may falsely increase ratio. Ratio    Protein S Free Activity Assay (04.15.20 @ 19:00)    Protein S Free Activity Assay: 104: Decreased levels of Protein S activity may be found in patients  with hereditary deficiency, warfarin therapy, vitamin k  deficiency, liver disease, DIC, or recent thrombosis as well as  after surgery. In addition, it may be physiologic in pregnancy.  An elevated Protein S activity is not clinically significant.  Only deficiencies are associated with an increased thrombotic  risk.   Test(s) performed at:      [facility]      Hollie Dan M.D., Ph.D., [place],       [id number] [facility][1]  Protein C Functional Assay with Reflex (04.15.20 @ 19:00)    Protein C Functional Assay with Reflex: 118: Protein C antigen will be performed only when the functional activity is  low. %        [place] [address]      CLIA #[id number] % normal        DVT Prophylaxis Lovenox[ ] Heparin[X ] Venodynes[ ] SCD's[ ]

## 2020-04-20 NOTE — PROGRESS NOTE ADULT - ASSESSMENT
48 y/o M with JORGE in hospital for slurred speech, left side facial droop, SOB, chills but afebrile, body aches.  PMH DM, RAE, Proteinuria    # JORGE: no baseline creatinine available. last OP labs ~ 5 years ago. poor compliance with meds./ most likely JORGE on CKD obstructive in nature and progressing over time as evidenced by CT findings   # etiology most likely chronic left obstruction with ATN now   # CT noted, followed by , lasix scan   # no CRISTÓBAL   # ph at [facility], pth noted start calcitriol 0.25 q 24  # check pr/cr ratio  # h/h at goal, no KATARINA   # if BP remains elevated increase nifedipine xl to 90 q 24  # slurred speech, facial drooping, weakness improved, followed by neurology   # no acute indication for RRT   # will follow 50 y/o M with JORGE in hospital for slurred speech, left side facial droop, SOB, chills but afebrile, body aches.  PMH DM, RAE, Proteinuria    # JORGE: no baseline creatinine available. last OP labs ~ 5 years ago. poor compliance with meds./ most likely JORGE on CKD obstructive in nature and progressing over time as evidenced by CT findings   # etiology most likely chronic left obstruction with ATN now   # CT noted, followed by , lasix scan   # no CRISTÓBAL   # ph at goal , pth noted start calcitriol 0.25 q 24  # check pr/cr ratio  # h/h at goal, no KATARINA   # BP noted keep current   # slurred speech, facial drooping, weakness improved, followed by neurology   # no acute indication for RRT   # will follow

## 2020-04-20 NOTE — PROGRESS NOTE ADULT - SUBJECTIVE AND OBJECTIVE BOX
Nephrology progress note    THIS IS AN INCOMPLETE NOTE . FULL NOTE TO FOLLOW SHORTLY    Patient is seen and examined, events over the last 24 h noted .    Allergies:  Allergy Status Unknown    Hospital Medications:   MEDICATIONS  (STANDING):  aspirin enteric coated 162 milliGRAM(s) Oral daily  atorvastatin 80 milliGRAM(s) Oral at bedtime  calcitriol   Capsule 0.25 MICROGram(s) Oral daily  chlorhexidine 4% Liquid 1 Application(s) Topical <User Schedule>  clopidogrel Tablet 75 milliGRAM(s) Oral daily  dextrose 5%. 1000 milliLiter(s) (50 mL/Hr) IV Continuous <Continuous>  dextrose 50% Injectable 12.5 Gram(s) IV Push once  dextrose 50% Injectable 25 Gram(s) IV Push once  dextrose 50% Injectable 25 Gram(s) IV Push once  heparin  Injectable 5000 Unit(s) SubCutaneous every 8 hours  insulin glargine Injectable (LANTUS) 10 Unit(s) SubCutaneous at bedtime  insulin lispro (HumaLOG) corrective regimen sliding scale   SubCutaneous three times a day before meals  labetalol 200 milliGRAM(s) Oral every 12 hours  NIFEdipine XL 60 milliGRAM(s) Oral <User Schedule>  NIFEdipine XL 30 milliGRAM(s) Oral <User Schedule>  pantoprazole    Tablet 40 milliGRAM(s) Oral before breakfast  senna 1 Tablet(s) Oral daily        VITALS:  T(F): 98.8 (20 @ 04:05), Max: 98.8 (20 @ 04:05)  HR: 84 (20 @ 06:23)  BP: 139/71 (20 @ 06:23)  RR: 18 (20 @ 20:15)  SpO2: 97% (20 @ 20:05)  Wt(kg): --     @ 07:01  -   @ 07:00  --------------------------------------------------------  IN: 420 mL / OUT: 200 mL / NET: 220 mL          PHYSICAL EXAM:  Constitutional: NAD  HEENT: anicteric sclera, oropharynx clear, MMM  Neck: No JVD  Respiratory: CTAB, no wheezes, rales or rhonchi  Cardiovascular: S1, S2, RRR  Gastrointestinal: BS+, soft, NT/ND  Extremities: No cyanosis or clubbing. No peripheral edema  :  No hinds.   Skin: No rashes    LABS:      139  |  103  |  43<H>  ----------------------------<  82  4.6   |  20  |  4.5<HH>  Creatinine Trend: 4.5<--, 4.7<--, 5.0<--, 5.1<--, 5.1<--, 4.0<--  Ca    8.5      2020 05:52    TPro  5.6<L>  /  Alb  3.3<L>  /  TBili  0.3  /  DBili      /  AST  169<H>  /  ALT  150<H>  /  AlkPhos  96                            13.8   6.13  )-----------( 202      ( 2020 05:52 )             40.9       Urine Studies:  Urinalysis Basic - ( 2020 22:50 )    Color: Yellow / Appearance: Clear / S.036 / pH:   Gluc:  / Ketone: Trace  / Bili: Negative / Urobili: <2 mg/dL   Blood:  / Protein: >600 mg/dL / Nitrite: Negative   Leuk Esterase: Negative / RBC: 3 /HPF / WBC 4 /HPF   Sq Epi:  / Non Sq Epi: 3 /HPF / Bacteria: Negative      Sodium, Random Urine: 82.0 mmoL/L (04-15 @ 11:40)  Creatinine, Random Urine: 148 mg/dL (04-15 @ 11:40)  Chloride, Random Urine: 79 (04-15 @ 11:40)  Potassium, Random Urine: 58 mmol/L (04-15 @ 11:40)    RADIOLOGY & ADDITIONAL STUDIES: Nephrology progress note  Patient is seen and examined, events over the last 24 h noted .  ambulating  denied any complaints   Allergies:  Allergy Status Unknown    Hospital Medications:   MEDICATIONS  (STANDING):  aspirin enteric coated 162 milliGRAM(s) Oral daily  atorvastatin 80 milliGRAM(s) Oral at bedtime  calcitriol   Capsule 0.25 MICROGram(s) Oral daily  clopidogrel Tablet 75 milliGRAM(s) Oral daily  dextrose 5%. 1000 milliLiter(s) (50 mL/Hr) IV Continuous <Continuous>  heparin  Injectable 5000 Unit(s) SubCutaneous every 8 hours  insulin glargine Injectable (LANTUS) 10 Unit(s) SubCutaneous at bedtime  insulin lispro (HumaLOG) corrective regimen sliding scale   SubCutaneous three times a day before meals  labetalol 200 milliGRAM(s) Oral every 12 hours  NIFEdipine XL 60 milliGRAM(s) Oral <User Schedule>  NIFEdipine XL 30 milliGRAM(s) Oral <User Schedule>  pantoprazole    Tablet 40 milliGRAM(s) Oral before breakfast  senna 1 Tablet(s) Oral daily        VITALS:  T(F): 98.8 (20 @ 04:05), Max: 98.8 (20 @ 04:05)  HR: 84 (20 @ 06:23)  BP: 139/71 (20 @ 06:23)  RR: 18 (20 @ 20:15)  SpO2: 97% (20 @ 20:05)     @ 07:01  -   @ 07:00  --------------------------------------------------------  IN: 420 mL / OUT: 200 mL / NET: 220 mL          PHYSICAL EXAM:  Constitutional: NAD  HEENT: anicteric sclera, oropharynx clear, MMM  Neck: No JVD  Respiratory: CTAB, no wheezes, rales or rhonchi  Cardiovascular: S1, S2, RRR  Gastrointestinal: BS+, soft, NT/ND  Extremities: No cyanosis or clubbing. No peripheral edema  :  No hinds.   Skin: No rashes    LABS:      139  |  103  |  43<H>  ----------------------------<  82  4.6   |  20  |  4.5<HH>  Creatinine Trend: 4.5<--, 4.7<--, 5.0<--, 5.1<--, 5.1<--, 4.0<--  Ca    8.5      2020 05:52    TPro  5.6<L>  /  Alb  3.3<L>  /  TBili  0.3  /  DBili      /  AST  169<H>  /  ALT  150<H>  /  AlkPhos  96                            13.8   6.13  )-----------( 202      ( 2020 05:52 )             40.9       Urine Studies:  Urinalysis Basic - ( 2020 22:50 )    Color: Yellow / Appearance: Clear / S.036 / pH:   Gluc:  / Ketone: Trace  / Bili: Negative / Urobili: <2 mg/dL   Blood:  / Protein: >600 mg/dL / Nitrite: Negative   Leuk Esterase: Negative / RBC: 3 /HPF / WBC 4 /HPF   Sq Epi:  / Non Sq Epi: 3 /HPF / Bacteria: Negative      Sodium, Random Urine: 82.0 mmoL/L (04-15 @ 11:40)  Creatinine, Random Urine: 148 mg/dL (04-15 @ 11:40)  Chloride, Random Urine: 79 (04-15 @ 11:40)  Potassium, Random Urine: 58 mmol/L (04-15 @ 11:40)    RADIOLOGY & ADDITIONAL STUDIES:

## 2020-04-21 LAB
ANION GAP SERPL CALC-SCNC: 14 MMOL/L — SIGNIFICANT CHANGE UP (ref 7–14)
BASOPHILS # BLD AUTO: 0.04 K/UL — SIGNIFICANT CHANGE UP (ref 0–0.2)
BASOPHILS NFR BLD AUTO: 0.8 % — SIGNIFICANT CHANGE UP (ref 0–1)
BUN SERPL-MCNC: 42 MG/DL — HIGH (ref 10–20)
CALCIUM SERPL-MCNC: 8.5 MG/DL — SIGNIFICANT CHANGE UP (ref 8.5–10.1)
CHLORIDE SERPL-SCNC: 105 MMOL/L — SIGNIFICANT CHANGE UP (ref 98–110)
CO2 SERPL-SCNC: 19 MMOL/L — SIGNIFICANT CHANGE UP (ref 17–32)
CREAT SERPL-MCNC: 4.8 MG/DL — CRITICAL HIGH (ref 0.7–1.5)
DNA PLOIDY SPEC FC-IMP: SIGNIFICANT CHANGE UP
EOSINOPHIL # BLD AUTO: 0.16 K/UL — SIGNIFICANT CHANGE UP (ref 0–0.7)
EOSINOPHIL NFR BLD AUTO: 3 % — SIGNIFICANT CHANGE UP (ref 0–8)
GLUCOSE BLDC GLUCOMTR-MCNC: 107 MG/DL — HIGH (ref 70–99)
GLUCOSE BLDC GLUCOMTR-MCNC: 73 MG/DL — SIGNIFICANT CHANGE UP (ref 70–99)
GLUCOSE BLDC GLUCOMTR-MCNC: 91 MG/DL — SIGNIFICANT CHANGE UP (ref 70–99)
GLUCOSE BLDC GLUCOMTR-MCNC: 91 MG/DL — SIGNIFICANT CHANGE UP (ref 70–99)
GLUCOSE SERPL-MCNC: 108 MG/DL — HIGH (ref 70–99)
HCT VFR BLD CALC: 40.4 % — LOW (ref 42–52)
HGB BLD-MCNC: 14 G/DL — SIGNIFICANT CHANGE UP (ref 14–18)
IMM GRANULOCYTES NFR BLD AUTO: 0.6 % — HIGH (ref 0.1–0.3)
LYMPHOCYTES # BLD AUTO: 0.69 K/UL — LOW (ref 1.2–3.4)
LYMPHOCYTES # BLD AUTO: 12.9 % — LOW (ref 20.5–51.1)
MCHC RBC-ENTMCNC: 29 PG — SIGNIFICANT CHANGE UP (ref 27–31)
MCHC RBC-ENTMCNC: 34.7 G/DL — SIGNIFICANT CHANGE UP (ref 32–37)
MCV RBC AUTO: 83.6 FL — SIGNIFICANT CHANGE UP (ref 80–94)
MONOCYTES # BLD AUTO: 0.81 K/UL — HIGH (ref 0.1–0.6)
MONOCYTES NFR BLD AUTO: 15.2 % — HIGH (ref 1.7–9.3)
MTHFR GENE INTERPRETATION: SIGNIFICANT CHANGE UP
NEUTROPHILS # BLD AUTO: 3.6 K/UL — SIGNIFICANT CHANGE UP (ref 1.4–6.5)
NEUTROPHILS NFR BLD AUTO: 67.5 % — SIGNIFICANT CHANGE UP (ref 42.2–75.2)
NRBC # BLD: 0 /100 WBCS — SIGNIFICANT CHANGE UP (ref 0–0)
PLATELET # BLD AUTO: 200 K/UL — SIGNIFICANT CHANGE UP (ref 130–400)
POTASSIUM SERPL-MCNC: 4.8 MMOL/L — SIGNIFICANT CHANGE UP (ref 3.5–5)
POTASSIUM SERPL-SCNC: 4.8 MMOL/L — SIGNIFICANT CHANGE UP (ref 3.5–5)
PTR INTERPRETATION: SIGNIFICANT CHANGE UP
RBC # BLD: 4.83 M/UL — SIGNIFICANT CHANGE UP (ref 4.7–6.1)
RBC # FLD: 12.6 % — SIGNIFICANT CHANGE UP (ref 11.5–14.5)
SODIUM SERPL-SCNC: 138 MMOL/L — SIGNIFICANT CHANGE UP (ref 135–146)
WBC # BLD: 5.33 K/UL — SIGNIFICANT CHANGE UP (ref 4.8–10.8)
WBC # FLD AUTO: 5.33 K/UL — SIGNIFICANT CHANGE UP (ref 4.8–10.8)

## 2020-04-21 PROCEDURE — 99232 SBSQ HOSP IP/OBS MODERATE 35: CPT

## 2020-04-21 RX ORDER — LABETALOL HCL 100 MG
100 TABLET ORAL EVERY 6 HOURS
Refills: 0 | Status: DISCONTINUED | OUTPATIENT
Start: 2020-04-21 | End: 2020-04-22

## 2020-04-21 RX ORDER — SODIUM CHLORIDE 9 MG/ML
500 INJECTION INTRAMUSCULAR; INTRAVENOUS; SUBCUTANEOUS ONCE
Refills: 0 | Status: COMPLETED | OUTPATIENT
Start: 2020-04-21 | End: 2020-04-21

## 2020-04-21 RX ORDER — NIFEDIPINE 30 MG
30 TABLET, EXTENDED RELEASE 24 HR ORAL ONCE
Refills: 0 | Status: COMPLETED | OUTPATIENT
Start: 2020-04-21 | End: 2020-04-21

## 2020-04-21 RX ORDER — SODIUM CHLORIDE 9 MG/ML
500 INJECTION INTRAMUSCULAR; INTRAVENOUS; SUBCUTANEOUS ONCE
Refills: 0 | Status: DISCONTINUED | OUTPATIENT
Start: 2020-04-21 | End: 2020-04-22

## 2020-04-21 RX ADMIN — Medication 100 MILLIGRAM(S): at 06:09

## 2020-04-21 RX ADMIN — CLOPIDOGREL BISULFATE 75 MILLIGRAM(S): 75 TABLET, FILM COATED ORAL at 12:12

## 2020-04-21 RX ADMIN — ATORVASTATIN CALCIUM 80 MILLIGRAM(S): 80 TABLET, FILM COATED ORAL at 21:23

## 2020-04-21 RX ADMIN — PANTOPRAZOLE SODIUM 40 MILLIGRAM(S): 20 TABLET, DELAYED RELEASE ORAL at 06:09

## 2020-04-21 RX ADMIN — LISINOPRIL 10 MILLIGRAM(S): 2.5 TABLET ORAL at 13:17

## 2020-04-21 RX ADMIN — Medication 100 MILLIGRAM(S): at 23:12

## 2020-04-21 RX ADMIN — HEPARIN SODIUM 5000 UNIT(S): 5000 INJECTION INTRAVENOUS; SUBCUTANEOUS at 06:08

## 2020-04-21 RX ADMIN — Medication 100 MILLIGRAM(S): at 18:36

## 2020-04-21 RX ADMIN — Medication 162 MILLIGRAM(S): at 12:12

## 2020-04-21 RX ADMIN — Medication 30 MILLIGRAM(S): at 01:07

## 2020-04-21 RX ADMIN — SODIUM CHLORIDE 1000 MILLILITER(S): 9 INJECTION INTRAMUSCULAR; INTRAVENOUS; SUBCUTANEOUS at 09:50

## 2020-04-21 RX ADMIN — Medication 30 MILLIGRAM(S): at 06:09

## 2020-04-21 RX ADMIN — HEPARIN SODIUM 5000 UNIT(S): 5000 INJECTION INTRAVENOUS; SUBCUTANEOUS at 13:20

## 2020-04-21 RX ADMIN — LISINOPRIL 10 MILLIGRAM(S): 2.5 TABLET ORAL at 21:25

## 2020-04-21 RX ADMIN — Medication 60 MILLIGRAM(S): at 17:27

## 2020-04-21 RX ADMIN — HEPARIN SODIUM 5000 UNIT(S): 5000 INJECTION INTRAVENOUS; SUBCUTANEOUS at 21:24

## 2020-04-21 RX ADMIN — CALCITRIOL 0.25 MICROGRAM(S): 0.5 CAPSULE ORAL at 12:12

## 2020-04-21 RX ADMIN — CHLORHEXIDINE GLUCONATE 1 APPLICATION(S): 213 SOLUTION TOPICAL at 06:09

## 2020-04-21 NOTE — PROGRESS NOTE ADULT - SUBJECTIVE AND OBJECTIVE BOX
Nephrology progress note    Patient was seen and examined, events over the last 24 h noted .    Allergies:  No Known Allergies  pioglitazone (Diarrhea)    Hospital Medications:   MEDICATIONS  (STANDING):  aspirin enteric coated 162 milliGRAM(s) Oral daily  atorvastatin 80 milliGRAM(s) Oral at bedtime  calcitriol   Capsule 0.25 MICROGram(s) Oral daily  chlorhexidine 4% Liquid 1 Application(s) Topical <User Schedule>  clopidogrel Tablet 75 milliGRAM(s) Oral daily  dextrose 5%. 1000 milliLiter(s) (50 mL/Hr) IV Continuous <Continuous>  dextrose 50% Injectable 12.5 Gram(s) IV Push once  dextrose 50% Injectable 25 Gram(s) IV Push once  dextrose 50% Injectable 25 Gram(s) IV Push once  heparin  Injectable 5000 Unit(s) SubCutaneous every 8 hours  insulin glargine Injectable (LANTUS) 10 Unit(s) SubCutaneous at bedtime  insulin lispro (HumaLOG) corrective regimen sliding scale   SubCutaneous three times a day before meals  labetalol 100 milliGRAM(s) Oral every 6 hours  lisinopril 10 milliGRAM(s) Oral every 12 hours  NIFEdipine XL 60 milliGRAM(s) Oral <User Schedule>  NIFEdipine XL 30 milliGRAM(s) Oral <User Schedule>  pantoprazole    Tablet 40 milliGRAM(s) Oral before breakfast  senna 1 Tablet(s) Oral daily  sodium chloride 0.9% Bolus 500 milliLiter(s) IV Bolus once  sodium chloride 0.9% Bolus 500 milliLiter(s) IV Bolus once        VITALS:  T(F): 98.7 (04-21-20 @ 08:03), Max: 98.9 (04-21-20 @ 05:27)  HR: 84 (04-21-20 @ 08:03)  BP: 123/69 (04-21-20 @ 08:03)  RR: 18 (04-21-20 @ 08:03)  SpO2: 99% (04-21-20 @ 08:03)  Wt(kg): --    04-19 @ 07:01  -  04-20 @ 07:00  --------------------------------------------------------  IN: 420 mL / OUT: 200 mL / NET: 220 mL          PHYSICAL EXAM:  Constitutional: NAD  HEENT: anicteric sclera, oropharynx clear, MMM  Neck: No JVD  Respiratory: CTAB, no wheezes, rales or rhonchi  Cardiovascular: S1, S2, RRR  Gastrointestinal: BS+, soft, NT/ND  Extremities: No cyanosis or clubbing. No peripheral edema  :  No hinds.   Skin: No rashes    LABS:  04-21    138  |  105  |  42<H>  ----------------------------<  108<H>  4.8   |  19  |  4.8<HH>    Ca    8.5      21 Apr 2020 05:18    TPro  5.6<L>  /  Alb  3.3<L>  /  TBili  0.3  /  DBili      /  AST  169<H>  /  ALT  150<H>  /  AlkPhos  96  04-20                          14.0   5.33  )-----------( 200      ( 21 Apr 2020 05:18 )             40.4       Urine Studies:    Creatinine, Random Urine: 24 mg/dL (04-20 @ 18:20)  Protein/Creatinine Ratio Calculation: 6.0 Ratio (04-20 @ 18:20)  Protein/Creatinine Ratio Calculation: >5.4 Ratio (04-20 @ 07:00)  Creatinine, Random Urine: 104 mg/dL (04-20 @ 07:00)  Sodium, Random Urine: 82.0 mmoL/L (04-15 @ 11:40)  Creatinine, Random Urine: 148 mg/dL (04-15 @ 11:40)  Chloride, Random Urine: 79 (04-15 @ 11:40)  Potassium, Random Urine: 58 mmol/L (04-15 @ 11:40)    RADIOLOGY & ADDITIONAL STUDIES:

## 2020-04-21 NOTE — PROGRESS NOTE ADULT - SUBJECTIVE AND OBJECTIVE BOX
Neurocritical Care Progress Note:    1. 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    2. Today's Acute Problems: HTN and scattered bilateral hemispheric infarcts (BG, corona radiata) compatible with probable cardioembolism    3. 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 Of note, he also reports of body aches "as if I had the flu," + chills, abdominal discomfort without N/V. He denies chest pain, cough, HA or dysuria. In ED, he was found to have /128, initial NIHSS 4. Initial CT head negative for intracranial bleed and no evidence of stroke. Patient was started on nicardipine drip but it was subsequently d/rodger per neurology request due to worsening left sided weakness and for sudden drop in SBP to ~170s. He was also found to have elevated creatinine at 4 (no baseline).     4-Yesterday's Plan:  - c/w  mg daily and Plavix 75 mg daily, and high dose statin  - c/w Lisinopril 10 mg PO Q12h (12pm/12am)  - Start labetalol 100 mg PO Q6h   - c/w Nifedipine XL 30 mg QAM (6am) / 60 mg QPM (6pm)  - Notify neuro team if SBP <130  - On d/c insulin lantus and linagliptin 5 mg QD  - Per urology, may f/u outpatient for renal workup of left hydronephrosis  - per Nephrology, start calcitriol 0.25 mg qd, recommend increase nifedipine XL 90 mg daily if BP remain elevated, f/u protein/creat ratio urine, continue renal diet, monitor I/Os, encourage PO hydration  - CPAP at night for RAE    5. Last 24 hour updates:  Patient's SBP gilma overnight to 190s, received nifedipine XL 30 mg po once, decreased to 141, then 123, then 96. Patient then received 500 cc NS bolus, SBP gilma to 113, then 142 as of 1300.    6. Medications:   aspirin enteric coated 162 milliGRAM(s) Oral daily  atorvastatin 80 milliGRAM(s) Oral at bedtime  calcitriol   Capsule 0.25 MICROGram(s) Oral daily  chlorhexidine 4% Liquid 1 Application(s) Topical <User Schedule>  clopidogrel Tablet 75 milliGRAM(s) Oral daily  dextrose 5%. 1000 milliLiter(s) IV Continuous <Continuous>  dextrose 50% Injectable 12.5 Gram(s) IV Push once  dextrose 50% Injectable 25 Gram(s) IV Push once  dextrose 50% Injectable 25 Gram(s) IV Push once  heparin  Injectable 5000 Unit(s) SubCutaneous every 8 hours  insulin glargine Injectable (LANTUS) 10 Unit(s) SubCutaneous at bedtime  insulin lispro (HumaLOG) corrective regimen sliding scale   SubCutaneous three times a day before meals  labetalol 100 milliGRAM(s) Oral every 6 hours  lisinopril 10 milliGRAM(s) Oral every 12 hours  NIFEdipine XL 60 milliGRAM(s) Oral <User Schedule>  NIFEdipine XL 30 milliGRAM(s) Oral <User Schedule>  pantoprazole    Tablet 40 milliGRAM(s) Oral before breakfast  senna 1 Tablet(s) Oral daily  sodium chloride 0.9% Bolus 500 milliLiter(s) IV Bolus once      7. Ancillary Management:   Chest PT[ ]   Head of bed >35 [ ]   Out of bed to chair [ X]   PT/OT/SP Eval [X ]   Spirometry[ ]   DVT prophylaxis[X ] with Heparin 5000 units SQ q8h    8.Neuro:   Mental status: Awake, alert and oriented x 4. Attention and concentration intact. Fund of knowledge appropriate.  Language: Naming, repetition, fluency, and comprehension intact. + dysarthria, no aphasia.  Cranial nerves: Pupils equally round and reactive to light, visual fields full, no nystagmus, extraocular muscles intact, V1 through V3 intact bilaterally and symmetric, left facial droop    Motor: Normal bulk and tone, strength 5/5 in bilateral upper and lower extremities.   strength 5/5.  Rapid alternating movements intact and symmetric.   Sensation: Intact to light touch, proprioception, and pinprick.  No neglect.   Coordination: No dysmetria on finger-to-nose and heel-to-shin.  No clumsiness.  Reflexes: 2+ in upper and lower extremities, downgoing toes bilaterally  Gait: Narrow and steady. No ataxia.     NIH STROKE SCALE  Item	                                                        Score  1 a.	Level of Consciousness	               	0  1 b. LOC Questions	                                    0  1 c.	LOC Commands	                               	0  2.	Best Gaze	                                    0  3.	Visual	                                                0  4.	Facial Palsy	                                    1  5 a.	Motor Arm - Left	                        0  5 b.	Motor Arm - Right	                        0  6 a.	Motor Leg - Left	                        0  6 b.	Motor Leg - Right	                        0  7.	Limb Ataxia	                                    0  8.	Sensory	                                                0  9.	Language	                                     0  10.	Dysarthria	                                    0  11.	Extinction and Inattention  	            0  ______________________________________________________________________  TOTAL	                                                        1    mRS: baseline 0 ->1 No significant disability despite symptoms; able to carry out all usual duties and activities without assistance    Last CTH:  < from: CT Head No Cont (04.16.20 @ 08:41) >  IMPRESSION:   1. No midline shift, mass effect, or acute intracranial hemorrhage.  2. redemonstration of focal infarcts in bilateral basal ganglia and frontal corona radiata. Refer to MRI brain dated 4/15/2020 for full description of findings.    < end of copied text >  < from: CT Head No Cont (04.13.20 @ 13:14) >  IMPRESSION:   In comparison with the prior noncontrast CT scan of the brain dated April 13, 2020 time 10:27 AM:  Stable examination.  MRI of the brain is suggested for further evaluation not contraindicated in this patient.    < from: CT Brain Stroke Protocol (04.13.20 @ 10:35) >  IMPRESSION:  1.  No acute hemorrhage  2.  No acute territorial infarct  3.  Bilateral relatively symmetric diminished density within the white matter tracts of the corona radiata extending to external capsules. The appearance suggests ischemic change, but does not appear acute    Last CTA/CTP:  < from: CT Perfusion w/ Maps w/ IV Cont (04.13.20 @ 11:00) >  FINDINGS:  Aortic arch: There is a normal branching pattern. The origins of the great vessels are normal. The subclavian arteries are unremarkable.  Right carotid: The common carotid artery is normal. The bifurcation is normally patent. The cervical internal and external carotid arteries are normal.  Left carotid: The common carotid artery is unremarkable. The bifurcation is patent. The cervical internal and external carotid arteries are normal.    Anterior intracranial circulation:    Right ICA: The petrous, cavernous and supraclinoid portions of the ICA are normal. The ophthalmic artery is patent. There is a prominent posterior communicating supply to the posterior cerebral artery with hypoplastic P1. The M1, bifurcation and remaining branches of the middle cerebral artery are patent. The anterior cerebral arteryA1 segment is mildly hypoplastic.    Left ICA: The petrous, cavernous and supraclinoid portions of the ICA are patent. There may be mild narrowing of the para ophthalmic ICA. The ophthalmic artery and small posterior communicating arteries are patent. The middle cerebral artery, trifurcation and branches are patent. The anterior cerebral arteries patent.    The region of the anterior to indicating arteries normal.    Vertebral basilar vessels: The vertebral arteries are patent in the neck. The left vertebral artery is dominant. The right vertebral artery is small and terminates in a posterior inferior cerebellar artery. The left PICA is patent. The basilar artery, superior cerebellar and posterior cerebral arteries are patent.    The visualized brain parenchyma was evaluated on the routine head CT performed at same time.    Other findings: The submandibular and parotid glands are unremarkable. The thyroid gland is unremarkable.    The visualized upper aerodigestive tract is normal.    Therapid perfusion maps do not demonstrate evidence for brain ischemia using the Tmax > 6 sec and CBF < 30% mismatch volume criteria.    Images to the upper thorax demonstrates that the visualized pleural parenchyma and mediastinum are normal.    IMPRESSION:  1.  Patent carotid bifurcations.  2.  No large vessel occlusion  3.  No evidence for ischemia using rapid perfusion maps and the Tmax > 6 sec and CBF < 30% mismatch volume criteria.    Last MRI:   < from: MR Head No Cont (04.15.20 @ 09:36) >  FINDINGS:   There is restricted diffusion with corresponding T2/FLAIR hyperintense signal in the right basal ganglia extending to the right frontal corona radiata compatible with infarct.There are also additional foci of restricted diffusion in the right external capsule, in the right periatrial white matter, compatible with lacunar infarcts. There are foci of high signal in the left corona radiata on the B 1000 sequence with corresponding T2 and FLAIR hyperintense signal, but no dark signal on the ADC map, compatible with subacute infarcts. There are chronic right gangliocapsular infarcts. Scattered periventricular punctate T2 FLAIR hyperintensities consistent with microangiopathy.    Ventricles and sulci are normal in size and configuration. No extra axial collection. No mass effect or midline shift.  No evidence of hemorrhage on the gradient echo sequence. The major vessel flow voids are preserved at the skull base.  Cerebellar tonsils are in normal location.      No gross orbital mass. Mild mucosal thickening in bilateral ethmoid air cells and right frontal and left maxillary sinuses. Mastoid air cells are normal in signal. Bone marrow signal is unremarkable.    IMPRESSION: Bilateral focal/lacunar acute and subacute infarcts as detailed above compatible with cardioembolic infarcts. No hemorrhagic transformation.    Last TCD: NONE    Last EEG: NONE    EVD: [ ] [place]: [ ]     ICP:     CPP:     Level(cm):     24hr(ml):     CSF:     WBC:     RBC:     Cx:     Protein:     Glucose:     LA:        Grain Stain:     9. Cardiovascular:   Vital Signs Last 24 Hrs  T(C): 37 (21 Apr 2020 12:59), Max: 37.2 (21 Apr 2020 05:27)  T(F): 98.6 (21 Apr 2020 12:59), Max: 98.9 (21 Apr 2020 05:27)  HR: 80 (21 Apr 2020 12:59) (76 - 90)  BP: 142/82 (21 Apr 2020 12:59) (96/62 - 190/109)  BP(mean): --  RR: 18 (21 Apr 2020 12:59) (18 - 18)  SpO2: 99% (21 Apr 2020 08:03) (98% - 99%)     Last Echo:   < from: Transthoracic Echocardiogram (04.15.20 @ 09:54) >  Summary:   1. LV Ejection Fraction by Ortiz's Method with a biplane EF of 57 %.   2. Moderately increased LV wall thickness.   3. Spectral Doppler shows impaired relaxation pattern of left ventricular myocardial filling (Grade I diastolic dysfunction).   4. Mild mitral valve regurgitation.   5. Color flow doppler and intravenous injection of agitated saline demonstrates the presence of an intact intra atrial septum.    PHYSICIAN INTERPRETATION:  Left Ventricle: The left ventricular internal cavity size is normal. Left ventricular wall thickness is moderately increased. Spectral Doppler shows impaired relaxation pattern of left ventricular myocardial filling (Grade I diastolic dysfunction).  Right Ventricle: Normal right ventricular size and function.  Left Atrium: Mildly enlarged left atrium. Color flow doppler and intravenous injection of agitated saline demonstrates the presence of an intact intra atrial septum.  Right Atrium: Normal right atrial size.  Pericardium: There is no evidence of pericardial effusion.  Mitral Valve: No evidence of mitral stenosis. Mild mitral valve regurgitation is seen.  Tricuspid Valve: Mild tricuspid regurgitation isvisualized.  Aortic Valve: No evidence of aortic stenosis. Aortic valve thickening with normal leaflet opening. Trivial aortic valve regurgitation is seen.  Pulmonic Valve: Mild pulmonic valve regurgitation.  Aorta: The aortic root is normal in size and structure.  Venous: The inferior vena cava was normal sized, with respiratory size variation greater than 50%.  Shunts: Agitated saline contrast was given intravenously to evaluate for intracardiac shunting.    Last EKG:  < from: 12 Lead ECG (04.13.20 @ 16:59) >  Ventricular Rate 83 BPM  Atrial Rate 83 BPM  P-R Interval 174 ms  QRS Duration 90 ms  Q-T Interval 382 ms  QTC Calculation(Bezet) 448 ms  P Axis 40 degrees  R Axis 79 degrees  T Axis 27 degrees  Diagnosis Line Normal sinus rhythm  Normal ECG      SBP target: 120-150    Enzymes/Trop: Troponin T, Serum (04.13.20 @ 17:07)    Troponin T, Serum: 0.06: Critical value: ng/mL      10. Respiratory:   ABG: n/a    VBG: n/a    Chest Xray:  < from: Xray Chest 1 View- PORTABLE-Routine (04.16.20 @ 10:45) >    IMPRESSION:    No radiographic evidence of acute cardiopulmonary disease.      Peak Pressure/[place] Pressure:    11.GI:    Prophylaxis:  Protonix 40 mg qd   Bowel mvt:  None   Abd distension: None    LIVER FUNCTIONS - ( 20 Apr 2020 05:52 )  Alb: 3.3 g/dL / Pro: 5.6 g/dL / ALK PHOS: 96 U/L / ALT: 150 U/L / AST: 169 U/L / GGT: x             12.Renal/Fluids/Electrolytes:    04-21    138  |  105  |  42<H>  ----------------------------<  108<H>  4.8   |  19  |  4.8<HH>    Ca    8.5      21 Apr 2020 05:18    TPro  5.6<L>  /  Alb  3.3<L>  /  TBili  0.3  /  DBili  x   /  AST  169<H>  /  ALT  150<H>  /  AlkPhos  96  04-20    13.ID:   TMax:   Vital Signs Last 24 Hrs  T(C): 37 (21 Apr 2020 12:59), Max: 37.2 (21 Apr 2020 05:27)  T(F): 98.6 (21 Apr 2020 12:59), Max: 98.9 (21 Apr 2020 05:27)  HR: 80 (21 Apr 2020 12:59) (76 - 90)  BP: 142/82 (21 Apr 2020 12:59) (96/62 - 190/109)  BP(mean): --  RR: 18 (21 Apr 2020 12:59) (18 - 18)  SpO2: 99% (21 Apr 2020 08:03) (98% - 99%)      Ferritin, Serum (04.15.20 @ 20:14)    Ferritin, Serum: 739 ng/mL    Lactate Dehydrogenase, Serum (04.15.20 @ 20:14)    Lactate Dehydrogenase, Serum: 259 U/L    D-Dimer Assay, Quantitative (04.15.20 @ 20:14)    D-Dimer Assay, Quantitative: 266 ng/mL DDU    C-Reactive Protein, Serum (04.13.20 @ 16:00)    C-Reactive Protein, Serum: 0.20 mg/dL       Lines: Central[] Date inserted: Peripheral[X]      14. Hematology:                         14.0   5.33  )-----------( 200      ( 21 Apr 2020 05:18 )             40.4      04-21    138  |  105  |  42<H>  ----------------------------<  108<H>  4.8   |  19  |  4.8<HH>    Ca    8.5      21 Apr 2020 05:18    TPro  5.6<L>  /  Alb  3.3<L>  /  TBili  0.3  /  DBili  x   /  AST  169<H>  /  ALT  150<H>  /  AlkPhos  96  04-20     Activated Protein C Resistance Assay (04.15.20 @ 19:00)    Activated Protein C Resistance Ratio: 2.95: APC  resistance ratio of <2.20  will be tested for Factor V  Leiden gene  mutation.  APC  resistance ratio of >=2.20 indicates no resistance to  activated Protein C. The presence of Refludan may falsely increase ratio. Ratio    Protein S Free Activity Assay (04.15.20 @ 19:00)    Protein S Free Activity Assay: 104: Decreased levels of Protein S activity may be found in patients  with hereditary deficiency, warfarin therapy, vitamin k  deficiency, liver disease, DIC, or recent thrombosis as well as  after surgery. In addition, it may be physiologic in pregnancy.  An elevated Protein S activity is not clinically significant.  Only deficiencies are associated with an increased thrombotic  risk.   Test(s) performed at:      [facility]      Hollie Dan M.D., Ph.D., [place],       [id number] [place] [address]  Protein C Functional Assay with Reflex (04.15.20 @ 19:00)    Protein C Functional Assay with Reflex: 118: Protein C antigen will be performed only when the functional activity is  low. %        [facility] [id number]      CLIA #[id number] % normal      DVT Prophylaxis Lovenox[ ] Heparin[ X ] Venodynes[ ] SCD's[ ] Neurocritical Care Progress Note:    1. 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    2. Today's Acute Problems: HTN and scattered bilateral hemispheric infarcts (BG, corona radiata) compatible with probable cardioembolism    3. 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 Of note, he also reports of body aches "as if I had the flu," + chills, abdominal discomfort without N/V. He denies chest pain, cough, HA or dysuria. In ED, he was found to have /128, initial NIHSS 4. Initial CT head negative for intracranial bleed and no evidence of stroke. Patient was started on nicardipine drip but it was subsequently d/rodger per neurology request due to worsening left sided weakness and for sudden drop in SBP to ~170s. He was also found to have elevated creatinine at 4 (no baseline).     4-Yesterday's Plan:  - c/w  mg daily and Plavix 75 mg daily, and high dose statin  - c/w Lisinopril 10 mg PO Q12h (12pm/12am)  - Start labetalol 100 mg PO Q6h   - c/w Nifedipine XL 30 mg QAM (6am) / 60 mg QPM (6pm)  - Notify neuro team if SBP <130  - On d/c insulin lantus and linagliptin 5 mg QD  - Per urology, may f/u outpatient for renal workup of left hydronephrosis  - per Nephrology, start calcitriol 0.25 mg qd, recommend increase nifedipine XL 90 mg daily if BP remain elevated, f/u protein/creat ratio urine, continue renal diet, monitor I/Os, encourage PO hydration  - CPAP at night for RAE    5. Last 24 hour updates:  Patient's SBP gilma overnight to 190s, received nifedipine XL 30 mg po once, decreased to 141, then 123, then 96. Patient then received 500 cc NS bolus, SBP gilma to 113, then 142 as of 1300.    6. Medications:   aspirin enteric coated 162 milliGRAM(s) Oral daily  atorvastatin 80 milliGRAM(s) Oral at bedtime  calcitriol   Capsule 0.25 MICROGram(s) Oral daily  chlorhexidine 4% Liquid 1 Application(s) Topical <User Schedule>  clopidogrel Tablet 75 milliGRAM(s) Oral daily  dextrose 5%. 1000 milliLiter(s) IV Continuous <Continuous>  dextrose 50% Injectable 12.5 Gram(s) IV Push once  dextrose 50% Injectable 25 Gram(s) IV Push once  dextrose 50% Injectable 25 Gram(s) IV Push once  heparin  Injectable 5000 Unit(s) SubCutaneous every 8 hours  insulin glargine Injectable (LANTUS) 10 Unit(s) SubCutaneous at bedtime  insulin lispro (HumaLOG) corrective regimen sliding scale   SubCutaneous three times a day before meals  labetalol 100 milliGRAM(s) Oral every 6 hours  lisinopril 10 milliGRAM(s) Oral every 12 hours  NIFEdipine XL 60 milliGRAM(s) Oral <User Schedule>  NIFEdipine XL 30 milliGRAM(s) Oral <User Schedule>  pantoprazole    Tablet 40 milliGRAM(s) Oral before breakfast  senna 1 Tablet(s) Oral daily  sodium chloride 0.9% Bolus 500 milliLiter(s) IV Bolus once      7. Ancillary Management:   Chest PT[ ]   Head of bed >35 [ ]   Out of bed to chair [ X]   PT/OT/SP Eval [X ]   Spirometry[ ]   DVT prophylaxis[X ] with Heparin 5000 units SQ q8h    8.Neuro:   Mental status: Awake, alert and oriented x 4. Attention and concentration intact. Fund of knowledge appropriate.  Language: Naming, repetition, fluency, and comprehension intact. + dysarthria, no aphasia.  Cranial nerves: Pupils equally round and reactive to light, visual fields full, no nystagmus, extraocular muscles intact, V1 through V3 intact bilaterally and symmetric, left facial droop    Motor: Normal bulk and tone, strength 5/5 in bilateral upper and lower extremities.   strength 5/5.  Rapid alternating movements intact and symmetric.   Sensation: Intact to light touch, proprioception, and pinprick.  No neglect.   Coordination: No dysmetria on finger-to-nose and heel-to-shin.  No clumsiness.  Reflexes: 2+ in upper and lower extremities, downgoing toes bilaterally  Gait: Narrow and steady. No ataxia.     NIH STROKE SCALE  Item	                                                        Score  1 a.	Level of Consciousness	               	0  1 b. LOC Questions	                                    0  1 c.	LOC Commands	                               	0  2.	Best Gaze	                                    0  3.	Visual	                                                0  4.	Facial Palsy	                                    1  5 a.	Motor Arm - Left	                        0  5 b.	Motor Arm - Right	                        0  6 a.	Motor Leg - Left	                        0  6 b.	Motor Leg - Right	                        0  7.	Limb Ataxia	                                    0  8.	Sensory	                                                0  9.	Language	                                     0  10.	Dysarthria	                                    0  11.	Extinction and Inattention  	            0  ______________________________________________________________________  TOTAL	                                                        1    mRS: baseline 0 ->1 No significant disability despite symptoms; able to carry out all usual duties and activities without assistance    Last CTH:  < from: CT Head No Cont (04.16.20 @ 08:41) >  IMPRESSION:   1. No midline shift, mass effect, or acute intracranial hemorrhage.  2. redemonstration of focal infarcts in bilateral basal ganglia and frontal corona radiata. Refer to MRI brain dated 4/15/2020 for full description of findings.    < end of copied text >  < from: CT Head No Cont (04.13.20 @ 13:14) >  IMPRESSION:   In comparison with the prior noncontrast CT scan of the brain dated April 13, 2020 time 10:27 AM:  Stable examination.  MRI of the brain is suggested for further evaluation not contraindicated in this patient.    < from: CT Brain Stroke Protocol (04.13.20 @ 10:35) >  IMPRESSION:  1.  No acute hemorrhage  2.  No acute territorial infarct  3.  Bilateral relatively symmetric diminished density within the white matter tracts of the corona radiata extending to external capsules. The appearance suggests ischemic change, but does not appear acute    Last CTA/CTP:  < from: CT Perfusion w/ Maps w/ IV Cont (04.13.20 @ 11:00) >  FINDINGS:  Aortic arch: There is a normal branching pattern. The origins of the great vessels are normal. The subclavian arteries are unremarkable.  Right carotid: The common carotid artery is normal. The bifurcation is normally patent. The cervical internal and external carotid arteries are normal.  Left carotid: The common carotid artery is unremarkable. The bifurcation is patent. The cervical internal and external carotid arteries are normal.    Anterior intracranial circulation:    Right ICA: The petrous, cavernous and supraclinoid portions of the ICA are normal. The ophthalmic artery is patent. There is a prominent posterior communicating supply to the posterior cerebral artery with hypoplastic P1. The M1, bifurcation and remaining branches of the middle cerebral artery are patent. The anterior cerebral arteryA1 segment is mildly hypoplastic.    Left ICA: The petrous, cavernous and supraclinoid portions of the ICA are patent. There may be mild narrowing of the para ophthalmic ICA. The ophthalmic artery and small posterior communicating arteries are patent. The middle cerebral artery, trifurcation and branches are patent. The anterior cerebral arteries patent.    The region of the anterior to indicating arteries normal.    Vertebral basilar vessels: The vertebral arteries are patent in the neck. The left vertebral artery is dominant. The right vertebral artery is small and terminates in a posterior inferior cerebellar artery. The left PICA is patent. The basilar artery, superior cerebellar and posterior cerebral arteries are patent.    The visualized brain parenchyma was evaluated on the routine head CT performed at same time.    Other findings: The submandibular and parotid glands are unremarkable. The thyroid gland is unremarkable.    The visualized upper aerodigestive tract is normal.    Therapid perfusion maps do not demonstrate evidence for brain ischemia using the Tmax > 6 sec and CBF < 30% mismatch volume criteria.    Images to the upper thorax demonstrates that the visualized pleural parenchyma and mediastinum are normal.    IMPRESSION:  1.  Patent carotid bifurcations.  2.  No large vessel occlusion  3.  No evidence for ischemia using rapid perfusion maps and the Tmax > 6 sec and CBF < 30% mismatch volume criteria.    Last MRI:   < from: MR Head No Cont (04.15.20 @ 09:36) >  FINDINGS:   There is restricted diffusion with corresponding T2/FLAIR hyperintense signal in the right basal ganglia extending to the right frontal corona radiata compatible with infarct.There are also additional foci of restricted diffusion in the right external capsule, in the right periatrial white matter, compatible with lacunar infarcts. There are foci of high signal in the left corona radiata on the B 1000 sequence with corresponding T2 and FLAIR hyperintense signal, but no dark signal on the ADC map, compatible with subacute infarcts. There are chronic right gangliocapsular infarcts. Scattered periventricular punctate T2 FLAIR hyperintensities consistent with microangiopathy.    Ventricles and sulci are normal in size and configuration. No extra axial collection. No mass effect or midline shift.  No evidence of hemorrhage on the gradient echo sequence. The major vessel flow voids are preserved at the skull base.  Cerebellar tonsils are in normal location.      No gross orbital mass. Mild mucosal thickening in bilateral ethmoid air cells and right frontal and left maxillary sinuses. Mastoid air cells are normal in signal. Bone marrow signal is unremarkable.    IMPRESSION: Bilateral focal/lacunar acute and subacute infarcts as detailed above compatible with cardioembolic infarcts. No hemorrhagic transformation.    Last TCD: NONE    Last EEG: NONE    EVD: [ ] [place]: [ ]     ICP:     CPP:     Level(cm):     24hr(ml):     CSF:     WBC:     RBC:     Cx:     Protein:     Glucose:     LA:        Grain Stain:     9. Cardiovascular:   Vital Signs Last 24 Hrs  T(C): 37 (21 Apr 2020 12:59), Max: 37.2 (21 Apr 2020 05:27)  T(F): 98.6 (21 Apr 2020 12:59), Max: 98.9 (21 Apr 2020 05:27)  HR: 80 (21 Apr 2020 12:59) (76 - 90)  BP: 142/82 (21 Apr 2020 12:59) (96/62 - 190/109)  BP(mean): --  RR: 18 (21 Apr 2020 12:59) (18 - 18)  SpO2: 99% (21 Apr 2020 08:03) (98% - 99%)     Last Echo:   < from: Transthoracic Echocardiogram (04.15.20 @ 09:54) >  Summary:   1. LV Ejection Fraction by Ortiz's Method with a biplane EF of 57 %.   2. Moderately increased LV wall thickness.   3. Spectral Doppler shows impaired relaxation pattern of left ventricular myocardial filling (Grade I diastolic dysfunction).   4. Mild mitral valve regurgitation.   5. Color flow doppler and intravenous injection of agitated saline demonstrates the presence of an intact intra atrial septum.    PHYSICIAN INTERPRETATION:  Left Ventricle: The left ventricular internal cavity size is normal. Left ventricular wall thickness is moderately increased. Spectral Doppler shows impaired relaxation pattern of left ventricular myocardial filling (Grade I diastolic dysfunction).  Right Ventricle: Normal right ventricular size and function.  Left Atrium: Mildly enlarged left atrium. Color flow doppler and intravenous injection of agitated saline demonstrates the presence of an intact intra atrial septum.  Right Atrium: Normal right atrial size.  Pericardium: There is no evidence of pericardial effusion.  Mitral Valve: No evidence of mitral stenosis. Mild mitral valve regurgitation is seen.  Tricuspid Valve: Mild tricuspid regurgitation isvisualized.  Aortic Valve: No evidence of aortic stenosis. Aortic valve thickening with normal leaflet opening. Trivial aortic valve regurgitation is seen.  Pulmonic Valve: Mild pulmonic valve regurgitation.  Aorta: The aortic root is normal in size and structure.  Venous: The inferior vena cava was normal sized, with respiratory size variation greater than 50%.  Shunts: Agitated saline contrast was given intravenously to evaluate for intracardiac shunting.    Last EKG:  < from: 12 Lead ECG (04.13.20 @ 16:59) >  Ventricular Rate 83 BPM  Atrial Rate 83 BPM  P-R Interval 174 ms  QRS Duration 90 ms  Q-T Interval 382 ms  QTC Calculation(Bezet) 448 ms  P Axis 40 degrees  R Axis 79 degrees  T Axis 27 degrees  Diagnosis Line Normal sinus rhythm  Normal ECG      SBP target: 120-150    Enzymes/Trop: Troponin T, Serum (04.13.20 @ 17:07)    Troponin T, Serum: 0.06: Critical value: ng/mL      10. Respiratory:   ABG: n/a    VBG: n/a    Chest Xray:  < from: Xray Chest 1 View- PORTABLE-Routine (04.16.20 @ 10:45) >    IMPRESSION:    No radiographic evidence of acute cardiopulmonary disease.      Peak Pressure/[place] Pressure:    11.GI:    Prophylaxis:  Protonix 40 mg qd   Bowel mvt:  None   Abd distension: None    LIVER FUNCTIONS - ( 20 Apr 2020 05:52 )  Alb: 3.3 g/dL / Pro: 5.6 g/dL / ALK PHOS: 96 U/L / ALT: 150 U/L / AST: 169 U/L / GGT: x             12.Renal/Fluids/Electrolytes:    04-21    138  |  105  |  42<H>  ----------------------------<  108<H>  4.8   |  19  |  4.8<HH>    Ca    8.5      21 Apr 2020 05:18    TPro  5.6<L>  /  Alb  3.3<L>  /  TBili  0.3  /  DBili  x   /  AST  169<H>  /  ALT  150<H>  /  AlkPhos  96  04-20    Protein/Creatinine Ratio, Urine (04.20.20 @ 18:20)    Protein/Creatinine Ratio Calculation: 6.0 Ratio    Creatinine, Random Urine: 24: Reference Ranges have NOT been established for random urine analytes due  to variability in fluid intake and concentration. mg/dL    Total Protein, Random Urine: 145 mg/dLG/24h        13.ID:   TMax:   Vital Signs Last 24 Hrs  T(C): 37 (21 Apr 2020 12:59), Max: 37.2 (21 Apr 2020 05:27)  T(F): 98.6 (21 Apr 2020 12:59), Max: 98.9 (21 Apr 2020 05:27)  HR: 80 (21 Apr 2020 12:59) (76 - 90)  BP: 142/82 (21 Apr 2020 12:59) (96/62 - 190/109)  BP(mean): --  RR: 18 (21 Apr 2020 12:59) (18 - 18)  SpO2: 99% (21 Apr 2020 08:03) (98% - 99%)      Ferritin, Serum (04.15.20 @ 20:14)    Ferritin, Serum: 739 ng/mL    Lactate Dehydrogenase, Serum (04.15.20 @ 20:14)    Lactate Dehydrogenase, Serum: 259 U/L    D-Dimer Assay, Quantitative (04.15.20 @ 20:14)    D-Dimer Assay, Quantitative: 266 ng/mL DDU    C-Reactive Protein, Serum (04.13.20 @ 16:00)    C-Reactive Protein, Serum: 0.20 mg/dL       Lines: Central[] Date inserted: Peripheral[X]      14. Hematology:                         14.0   5.33  )-----------( 200      ( 21 Apr 2020 05:18 )             40.4      04-21    138  |  105  |  42<H>  ----------------------------<  108<H>  4.8   |  19  |  4.8<HH>    Ca    8.5      21 Apr 2020 05:18    TPro  5.6<L>  /  Alb  3.3<L>  /  TBili  0.3  /  DBili  x   /  AST  169<H>  /  ALT  150<H>  /  AlkPhos  96  04-20     Activated Protein C Resistance Assay (04.15.20 @ 19:00)    Activated Protein C Resistance Ratio: 2.95: APC  resistance ratio of <2.20  will be tested for Factor V  Leiden gene  mutation.  APC  resistance ratio of >=2.20 indicates no resistance to  activated Protein C. The presence of Refludan may falsely increase ratio. Ratio    Protein S Free Activity Assay (04.15.20 @ 19:00)    Protein S Free Activity Assay: 104: Decreased levels of Protein S activity may be found in patients  with hereditary deficiency, warfarin therapy, vitamin k  deficiency, liver disease, DIC, or recent thrombosis as well as  after surgery. In addition, it may be physiologic in pregnancy.  An elevated Protein S activity is not clinically significant.  Only deficiencies are associated with an increased thrombotic  risk.   Test(s) performed at:      [facility]      Hollie Dan M.D., Ph.D., [place],       [address] [place] [address]  Protein C Functional Assay with Reflex (04.15.20 @ 19:00)    Protein C Functional Assay with Reflex: 118: Protein C antigen will be performed only when the functional activity is  low. %        [place] [address]      CLIA #[id number] % normal      DVT Prophylaxis Lovenox[ ] Heparin[ X ] Venodynes[ ] SCD's[ ]

## 2020-04-21 NOTE — PROGRESS NOTE ADULT - ASSESSMENT
15. Impression:  A 49 years old right handed man with uncontrolled DM (A1C 9.2), DLD, REA, Aflutter, unclear chronicity of severe left hydronephrosis who presents with slurred speech, left sided facial droop, and left sided hemiparesis, body aches and chills is found to have acute right lacunar infarcts in R basal ganglia, L subacute lacunar infarct, R posterior commissure infarct, corresponding to both anterior and posterior circulation. Etiology likely to be atypical embolic source, and anticoagulation is warranted once BP remain lauren to minimize hemorrhagic conversion. Prelim hypercoagulable work up negative. His COVID PCR negative.       16. Suggestions:        17. Disposition: 15. Impression:  A 49 years old right handed man with uncontrolled DM (A1C 9.2), DLD, ARE, Aflutter, unclear chronicity of severe left hydronephrosis who presents with slurred speech, left sided facial droop, and left sided hemiparesis, body aches and chills is found to have acute right lacunar infarcts in R basal ganglia, L subacute lacunar infarct, R posterior commissure infarct, corresponding to both anterior and posterior circulation. Etiology likely to be atypical embolic source, and anticoagulation is warranted once BP remain lauren to minimize hemorrhagic conversion. Prelim hypercoagulable work up negative. His COVID PCR was negative.       16. Suggestions:    CNS:  - c/w  mg daily and Plavix 75 mg daily, and high dose statin    HEENT:   - Oral care    PULMONARY:  - CPAP at night for RAE    CARDIOVASCULAR:  - c/w Lisinopril 10 mg PO Q12h (12pm/12am)  - Continue labetalol 100 mg PO Q6h   - c/w Nifedipine XL 30 mg QAM (6am) / 60 mg QPM (6pm)  - Notify neuro team if SBP <120      GI:  - GI prophylaxis: Protonix      RENAL:   - Per urology, may f/u outpatient for renal workup of left hydronephrosis  - Nephrology following, recommend increase nifedipine XL 90 mg daily if BP remain elevated,  - continue calcitriol 0.25 mg qd  - continue renal diet, monitor I/Os, encourage PO hydration  -  protein/creat ratio urine 6.0 (elevated)  - f/u SPEP, UPEP      INFECTIOUS DISEASE:   -afebrile, no leukocytosis  -monitor CBC    HEMATOLOGICAL:    -DVT prophylaxis: subq heparin    ENDOCRINE:    - On d/c insulin lantus and linagliptin 5 mg QD    MUSCULOSKELETAL:   -encourage ambulation    CODE STATUS: FULL CODE        17. Disposition:  -to home with home PT 15. Impression:  A 49 years old right handed man with uncontrolled DM (A1C 9.2), DLD, RAE, Aflutter, unclear chronicity of severe left hydronephrosis who presents with slurred speech, left sided facial droop, and left sided hemiparesis, body aches and chills is found to have acute right lacunar infarcts in R basal ganglia, L subacute lacunar infarct, R posterior commissure infarct, corresponding to both anterior and posterior circulation. Etiology likely to be atypical embolic source, and anticoagulation is warranted once BP remain lauren to minimize hemorrhagic conversion. Prelim hypercoagulable work up negative. His COVID PCR was negative.       16. Suggestions:    CNS:  - c/w  mg daily and Plavix 75 mg daily, and high dose statin    HEENT:   - Oral care    PULMONARY:  - CPAP at night for RAE    CARDIOVASCULAR:  - SBP target range 120-150  - c/w Lisinopril 10 mg PO Q12h (12pm/12am)  - Continue labetalol 100 mg PO Q6h prn for SBP >150  - c/w Nifedipine XL 30 mg QAM (6am) / 60 mg QPM (6pm)  - Notify neuro team if SBP <120      GI:  - GI prophylaxis: Protonix      RENAL:   - Per urology, may f/u outpatient for renal workup of left hydronephrosis  - Nephrology following, recommend increase nifedipine XL 90 mg daily if BP remain elevated,  - continue calcitriol 0.25 mg qd  - continue renal diet, monitor I/Os, encourage PO hydration  - protein/creat ratio urine 6.0 (elevated)  - f/u SPEP, UPEP      INFECTIOUS DISEASE:   -afebrile, no leukocytosis  -monitor daily CBC    HEMATOLOGICAL:    -DVT prophylaxis: subq heparin  - once BP stable, will start eliquis 2.5 mg po bid    ENDOCRINE:    - On d/c insulin lantus and linagliptin 5 mg QD    MUSCULOSKELETAL:   -encourage ambulation    CODE STATUS: FULL CODE        17. Disposition:  -to home with home PT once BP stable

## 2020-04-21 NOTE — PROGRESS NOTE ADULT - ASSESSMENT
48 y/o M with JORGE in hospital for slurred speech, left side facial droop, SOB, chills but afebrile, body aches.  PMH DM, RAE, Proteinuria    # JORGE: no baseline creatinine available. last OP labs ~ 5 years ago. poor compliance with meds./ most likely JORGE on CKD obstructive in nature and progressing over time as evidenced by CT findings   # etiology most likely chronic left obstruction with ATN now   # CT noted,  seen by , planned for lasix scan   # no CRISTÓBAL   # ph at goal , pth noted started calcitriol 0.25 q 24   > 6 grams protienuria  hx of diabetes  check SPEP UPEP (though unlikely MM given Hgb 14)   [facility] Hepatitis and HICV serologies   # h/h at goal, no KATARINA   # BP noted keep current   # slurred speech, facial drooping, weakness improved, followed by neurology   # no acute indication for RRT   # will follow

## 2020-04-22 ENCOUNTER — TRANSCRIPTION ENCOUNTER (OUTPATIENT)
Age: 50
End: 2020-04-22

## 2020-04-22 VITALS — SYSTOLIC BLOOD PRESSURE: 143 MMHG | TEMPERATURE: 98 F | HEART RATE: 97 BPM | DIASTOLIC BLOOD PRESSURE: 87 MMHG

## 2020-04-22 LAB
ALBUMIN SERPL ELPH-MCNC: 3.2 G/DL — LOW (ref 3.5–5.2)
ALP SERPL-CCNC: 112 U/L — SIGNIFICANT CHANGE UP (ref 30–115)
ALT FLD-CCNC: 140 U/L — HIGH (ref 0–41)
ANION GAP SERPL CALC-SCNC: 17 MMOL/L — HIGH (ref 7–14)
APPEARANCE UR: ABNORMAL
AST SERPL-CCNC: 107 U/L — HIGH (ref 0–41)
BACTERIA # UR AUTO: NEGATIVE — SIGNIFICANT CHANGE UP
BASOPHILS # BLD AUTO: 0.02 K/UL — SIGNIFICANT CHANGE UP (ref 0–0.2)
BASOPHILS # BLD AUTO: 0.02 K/UL — SIGNIFICANT CHANGE UP (ref 0–0.2)
BASOPHILS NFR BLD AUTO: 0.3 % — SIGNIFICANT CHANGE UP (ref 0–1)
BASOPHILS NFR BLD AUTO: 0.4 % — SIGNIFICANT CHANGE UP (ref 0–1)
BILIRUB SERPL-MCNC: 0.3 MG/DL — SIGNIFICANT CHANGE UP (ref 0.2–1.2)
BILIRUB UR-MCNC: NEGATIVE — SIGNIFICANT CHANGE UP
BUN SERPL-MCNC: 46 MG/DL — HIGH (ref 10–20)
CALCIUM SERPL-MCNC: 8.4 MG/DL — LOW (ref 8.5–10.1)
CHLORIDE SERPL-SCNC: 105 MMOL/L — SIGNIFICANT CHANGE UP (ref 98–110)
CO2 SERPL-SCNC: 17 MMOL/L — SIGNIFICANT CHANGE UP (ref 17–32)
COLOR SPEC: YELLOW — SIGNIFICANT CHANGE UP
CREAT SERPL-MCNC: 5.2 MG/DL — CRITICAL HIGH (ref 0.7–1.5)
CREATININE, URINE RESULT: 124 MG/DL — SIGNIFICANT CHANGE UP
CRP SERPL-MCNC: 2.6 MG/DL — HIGH (ref 0–0.4)
D DIMER BLD IA.RAPID-MCNC: 335 NG/ML DDU — HIGH (ref 0–230)
DIFF PNL FLD: ABNORMAL
EOSINOPHIL # BLD AUTO: 0.32 K/UL — SIGNIFICANT CHANGE UP (ref 0–0.7)
EOSINOPHIL # BLD AUTO: 0.37 K/UL — SIGNIFICANT CHANGE UP (ref 0–0.7)
EOSINOPHIL NFR BLD AUTO: 5.9 % — SIGNIFICANT CHANGE UP (ref 0–8)
EOSINOPHIL NFR BLD AUTO: 6.2 % — SIGNIFICANT CHANGE UP (ref 0–8)
EPI CELLS # UR: 4 /HPF — SIGNIFICANT CHANGE UP (ref 0–5)
FERRITIN SERPL-MCNC: 2389 NG/ML — HIGH (ref 30–400)
GLUCOSE BLDC GLUCOMTR-MCNC: 105 MG/DL — HIGH (ref 70–99)
GLUCOSE BLDC GLUCOMTR-MCNC: 119 MG/DL — HIGH (ref 70–99)
GLUCOSE BLDC GLUCOMTR-MCNC: 94 MG/DL — SIGNIFICANT CHANGE UP (ref 70–99)
GLUCOSE SERPL-MCNC: 127 MG/DL — HIGH (ref 70–99)
GLUCOSE UR QL: ABNORMAL
HAV IGM SER-ACNC: SIGNIFICANT CHANGE UP
HBV CORE IGM SER-ACNC: SIGNIFICANT CHANGE UP
HBV SURFACE AG SER-ACNC: SIGNIFICANT CHANGE UP
HCT VFR BLD CALC: 40.9 % — LOW (ref 42–52)
HCT VFR BLD CALC: 45.8 % — SIGNIFICANT CHANGE UP (ref 42–52)
HCV AB S/CO SERPL IA: 0.09 S/CO — SIGNIFICANT CHANGE UP (ref 0–0.99)
HCV AB SERPL-IMP: SIGNIFICANT CHANGE UP
HGB BLD-MCNC: 14.1 G/DL — SIGNIFICANT CHANGE UP (ref 14–18)
HGB BLD-MCNC: 15.7 G/DL — SIGNIFICANT CHANGE UP (ref 14–18)
HIV 1+2 AB+HIV1 P24 AG SERPL QL IA: SIGNIFICANT CHANGE UP
HYALINE CASTS # UR AUTO: 13 /LPF — HIGH (ref 0–7)
IMM GRANULOCYTES NFR BLD AUTO: 0.4 % — HIGH (ref 0.1–0.3)
IMM GRANULOCYTES NFR BLD AUTO: 0.8 % — HIGH (ref 0.1–0.3)
KETONES UR-MCNC: SIGNIFICANT CHANGE UP
LDH SERPL L TO P-CCNC: 286 U/L — HIGH (ref 50–242)
LEUKOCYTE ESTERASE UR-ACNC: NEGATIVE — SIGNIFICANT CHANGE UP
LYMPHOCYTES # BLD AUTO: 0.4 K/UL — LOW (ref 1.2–3.4)
LYMPHOCYTES # BLD AUTO: 0.49 K/UL — LOW (ref 1.2–3.4)
LYMPHOCYTES # BLD AUTO: 6.7 % — LOW (ref 20.5–51.1)
LYMPHOCYTES # BLD AUTO: 9.1 % — LOW (ref 20.5–51.1)
MCHC RBC-ENTMCNC: 28.6 PG — SIGNIFICANT CHANGE UP (ref 27–31)
MCHC RBC-ENTMCNC: 28.8 PG — SIGNIFICANT CHANGE UP (ref 27–31)
MCHC RBC-ENTMCNC: 34.3 G/DL — SIGNIFICANT CHANGE UP (ref 32–37)
MCHC RBC-ENTMCNC: 34.5 G/DL — SIGNIFICANT CHANGE UP (ref 32–37)
MCV RBC AUTO: 83.5 FL — SIGNIFICANT CHANGE UP (ref 80–94)
MCV RBC AUTO: 83.6 FL — SIGNIFICANT CHANGE UP (ref 80–94)
MONOCYTES # BLD AUTO: 0.54 K/UL — SIGNIFICANT CHANGE UP (ref 0.1–0.6)
MONOCYTES # BLD AUTO: 0.71 K/UL — HIGH (ref 0.1–0.6)
MONOCYTES NFR BLD AUTO: 10 % — HIGH (ref 1.7–9.3)
MONOCYTES NFR BLD AUTO: 11.9 % — HIGH (ref 1.7–9.3)
NEUTROPHILS # BLD AUTO: 4.01 K/UL — SIGNIFICANT CHANGE UP (ref 1.4–6.5)
NEUTROPHILS # BLD AUTO: 4.4 K/UL — SIGNIFICANT CHANGE UP (ref 1.4–6.5)
NEUTROPHILS NFR BLD AUTO: 74.1 % — SIGNIFICANT CHANGE UP (ref 42.2–75.2)
NEUTROPHILS NFR BLD AUTO: 74.2 % — SIGNIFICANT CHANGE UP (ref 42.2–75.2)
NITRITE UR-MCNC: NEGATIVE — SIGNIFICANT CHANGE UP
NRBC # BLD: 0 /100 WBCS — SIGNIFICANT CHANGE UP (ref 0–0)
NRBC # BLD: 0 /100 WBCS — SIGNIFICANT CHANGE UP (ref 0–0)
PH UR: 6 — SIGNIFICANT CHANGE UP (ref 5–8)
PLATELET # BLD AUTO: 171 K/UL — SIGNIFICANT CHANGE UP (ref 130–400)
PLATELET # BLD AUTO: 172 K/UL — SIGNIFICANT CHANGE UP (ref 130–400)
POTASSIUM SERPL-MCNC: 4.8 MMOL/L — SIGNIFICANT CHANGE UP (ref 3.5–5)
POTASSIUM SERPL-SCNC: 4.8 MMOL/L — SIGNIFICANT CHANGE UP (ref 3.5–5)
PROCALCITONIN SERPL-MCNC: 2.66 NG/ML — HIGH (ref 0.02–0.1)
PROT ?TM UR-MCNC: 492 MG/DL — HIGH (ref 0–12)
PROT SERPL-MCNC: 5.6 G/DL — LOW (ref 6–8)
PROT SERPL-MCNC: 5.6 G/DL — LOW (ref 6–8.3)
PROT SERPL-MCNC: 5.6 G/DL — LOW (ref 6–8.3)
PROT UR-MCNC: ABNORMAL
RBC # BLD: 4.9 M/UL — SIGNIFICANT CHANGE UP (ref 4.7–6.1)
RBC # BLD: 5.48 M/UL — SIGNIFICANT CHANGE UP (ref 4.7–6.1)
RBC # FLD: 12.5 % — SIGNIFICANT CHANGE UP (ref 11.5–14.5)
RBC # FLD: 12.5 % — SIGNIFICANT CHANGE UP (ref 11.5–14.5)
RBC CASTS # UR COMP ASSIST: 3 /HPF — SIGNIFICANT CHANGE UP (ref 0–4)
SODIUM SERPL-SCNC: 139 MMOL/L — SIGNIFICANT CHANGE UP (ref 135–146)
SP GR SPEC: 1.02 — SIGNIFICANT CHANGE UP (ref 1.01–1.02)
UROBILINOGEN FLD QL: SIGNIFICANT CHANGE UP
WBC # BLD: 5.4 K/UL — SIGNIFICANT CHANGE UP (ref 4.8–10.8)
WBC # BLD: 5.95 K/UL — SIGNIFICANT CHANGE UP (ref 4.8–10.8)
WBC # FLD AUTO: 5.4 K/UL — SIGNIFICANT CHANGE UP (ref 4.8–10.8)
WBC # FLD AUTO: 5.95 K/UL — SIGNIFICANT CHANGE UP (ref 4.8–10.8)
WBC UR QL: 9 /HPF — HIGH (ref 0–5)

## 2020-04-22 PROCEDURE — 93970 EXTREMITY STUDY: CPT | Mod: 26

## 2020-04-22 PROCEDURE — 71045 X-RAY EXAM CHEST 1 VIEW: CPT | Mod: 26

## 2020-04-22 PROCEDURE — 99232 SBSQ HOSP IP/OBS MODERATE 35: CPT

## 2020-04-22 RX ORDER — NIFEDIPINE 30 MG
1 TABLET, EXTENDED RELEASE 24 HR ORAL
Qty: 30 | Refills: 0
Start: 2020-04-22 | End: 2020-05-21

## 2020-04-22 RX ORDER — CLOPIDOGREL BISULFATE 75 MG/1
1 TABLET, FILM COATED ORAL
Qty: 30 | Refills: 0
Start: 2020-04-22 | End: 2020-05-21

## 2020-04-22 RX ORDER — LISINOPRIL 2.5 MG/1
1 TABLET ORAL
Qty: 0 | Refills: 0 | DISCHARGE
Start: 2020-04-22 | End: 2020-05-21

## 2020-04-22 RX ORDER — PANTOPRAZOLE SODIUM 20 MG/1
1 TABLET, DELAYED RELEASE ORAL
Qty: 0 | Refills: 0 | DISCHARGE
Start: 2020-04-22 | End: 2020-05-21

## 2020-04-22 RX ORDER — CALCITRIOL 0.5 UG/1
1 CAPSULE ORAL
Qty: 0 | Refills: 0 | DISCHARGE
Start: 2020-04-22 | End: 2020-05-21

## 2020-04-22 RX ORDER — LISINOPRIL 2.5 MG/1
1 TABLET ORAL
Qty: 60 | Refills: 0
Start: 2020-04-22 | End: 2020-05-21

## 2020-04-22 RX ORDER — CLOPIDOGREL BISULFATE 75 MG/1
1 TABLET, FILM COATED ORAL
Qty: 0 | Refills: 0 | DISCHARGE
Start: 2020-04-22 | End: 2020-05-21

## 2020-04-22 RX ORDER — INSULIN GLARGINE 100 [IU]/ML
0.1 INJECTION, SOLUTION SUBCUTANEOUS
Qty: 3 | Refills: 0
Start: 2020-04-22 | End: 2020-05-21

## 2020-04-22 RX ORDER — LABETALOL HCL 100 MG
100 TABLET ORAL EVERY 6 HOURS
Refills: 0 | Status: DISCONTINUED | OUTPATIENT
Start: 2020-04-22 | End: 2020-04-22

## 2020-04-22 RX ORDER — ASPIRIN/CALCIUM CARB/MAGNESIUM 324 MG
2 TABLET ORAL
Qty: 60 | Refills: 0
Start: 2020-04-22 | End: 2020-05-21

## 2020-04-22 RX ORDER — ATORVASTATIN CALCIUM 80 MG/1
1 TABLET, FILM COATED ORAL
Qty: 30 | Refills: 0
Start: 2020-04-22 | End: 2020-05-21

## 2020-04-22 RX ORDER — LABETALOL HCL 100 MG
1 TABLET ORAL
Qty: 120 | Refills: 0
Start: 2020-04-22 | End: 2020-05-21

## 2020-04-22 RX ORDER — CALCITRIOL 0.5 UG/1
1 CAPSULE ORAL
Qty: 30 | Refills: 0
Start: 2020-04-22 | End: 2020-05-21

## 2020-04-22 RX ORDER — ISOPROPYL ALCOHOL, BENZOCAINE .7; .06 ML/ML; ML/ML
1 SWAB TOPICAL
Qty: 100 | Refills: 1
Start: 2020-04-22 | End: 2020-06-10

## 2020-04-22 RX ORDER — NIFEDIPINE 30 MG
90 TABLET, EXTENDED RELEASE 24 HR ORAL EVERY 24 HOURS
Refills: 0 | Status: DISCONTINUED | OUTPATIENT
Start: 2020-04-22 | End: 2020-04-22

## 2020-04-22 RX ORDER — PANTOPRAZOLE SODIUM 20 MG/1
1 TABLET, DELAYED RELEASE ORAL
Qty: 30 | Refills: 0
Start: 2020-04-22 | End: 2020-05-21

## 2020-04-22 RX ADMIN — Medication 650 MILLIGRAM(S): at 04:55

## 2020-04-22 RX ADMIN — HEPARIN SODIUM 5000 UNIT(S): 5000 INJECTION INTRAVENOUS; SUBCUTANEOUS at 15:02

## 2020-04-22 RX ADMIN — CHLORHEXIDINE GLUCONATE 1 APPLICATION(S): 213 SOLUTION TOPICAL at 05:44

## 2020-04-22 RX ADMIN — Medication 30 MILLIGRAM(S): at 04:59

## 2020-04-22 RX ADMIN — LISINOPRIL 10 MILLIGRAM(S): 2.5 TABLET ORAL at 12:38

## 2020-04-22 RX ADMIN — CALCITRIOL 0.25 MICROGRAM(S): 0.5 CAPSULE ORAL at 12:38

## 2020-04-22 RX ADMIN — HEPARIN SODIUM 5000 UNIT(S): 5000 INJECTION INTRAVENOUS; SUBCUTANEOUS at 05:44

## 2020-04-22 RX ADMIN — Medication 162 MILLIGRAM(S): at 12:39

## 2020-04-22 RX ADMIN — CLOPIDOGREL BISULFATE 75 MILLIGRAM(S): 75 TABLET, FILM COATED ORAL at 12:38

## 2020-04-22 RX ADMIN — PANTOPRAZOLE SODIUM 40 MILLIGRAM(S): 20 TABLET, DELAYED RELEASE ORAL at 05:00

## 2020-04-22 RX ADMIN — Medication 650 MILLIGRAM(S): at 12:39

## 2020-04-22 NOTE — DISCHARGE NOTE NURSING/CASE MANAGEMENT/SOCIAL WORK - PATIENT PORTAL LINK FT
You can access the FollowMyHealth Patient Portal offered by [facility] by registering at the following website: [email or web address]. By joining [facility] FollowMyHealth portal, you will also be able to view your health information using other applications (apps) compatible with our system.

## 2020-04-22 NOTE — PROGRESS NOTE ADULT - SUBJECTIVE AND OBJECTIVE BOX
Neurocritical Care Progress Note:    1. 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    2. Today's Acute Problems: continued HTN, elevated creatinine, fevers overnight    3. 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 Of note, he also reports of body aches "as if I had the flu," + chills, abdominal discomfort without N/V. He denies chest pain, cough, HA or dysuria. In ED, he was found to have /128, initial NIHSS 4. Initial CT head negative for intracranial bleed and no evidence of stroke. Patient was started on nicardipine drip but it was subsequently d/rodger per neurology request due to worsening left sided weakness and for sudden drop in SBP to ~170s. He was also found to have elevated creatinine at 4 (no baseline).     4-Yesterday's Plan:    CNS:  - c/w  mg daily and Plavix 75 mg daily, and high dose statin    HEENT:   - Oral care    PULMONARY:  - CPAP at night for RAE    CARDIOVASCULAR:  - SBP target range 120-150  - c/w Lisinopril 10 mg PO Q12h (12pm/12am)  - Continue labetalol 100 mg PO Q6h prn for SBP >150  - c/w Nifedipine XL 30 mg QAM (6am) / 60 mg QPM (6pm)  - Notify neuro team if SBP <120    GI:  - GI prophylaxis: Protonix      RENAL:   - Per urology, may f/u outpatient for renal workup of left hydronephrosis  - Nephrology following, recommend increase nifedipine XL 90 mg daily if BP remain elevated,  - continue calcitriol 0.25 mg qd  - continue renal diet, monitor I/Os, encourage PO hydration  - protein/creat ratio urine 6.0 (elevated)  - f/u SPEP, UPEP    INFECTIOUS DISEASE:   -afebrile, no leukocytosis  -monitor daily CBC    HEMATOLOGICAL:    -DVT prophylaxis: subq heparin  - once BP stable, will start eliquis 2.5 mg po bid    ENDOCRINE:    - On d/c insulin lantus and linagliptin 5 mg QD    MUSCULOSKELETAL:   -encourage ambulation    5. Last 24 hour updates: Patient had continued HTN overnight to 170s, had fever of 102.5 at 5AM, creatinine gilma to 5.2 today, up from 4.8 yesterday.    6. Medications:   aspirin enteric coated 162 milliGRAM(s) Oral daily  atorvastatin 80 milliGRAM(s) Oral at bedtime  calcitriol   Capsule 0.25 MICROGram(s) Oral daily  chlorhexidine 4% Liquid 1 Application(s) Topical <User Schedule>  clopidogrel Tablet 75 milliGRAM(s) Oral daily  dextrose 5%. 1000 milliLiter(s) IV Continuous <Continuous>  dextrose 50% Injectable 12.5 Gram(s) IV Push once  dextrose 50% Injectable 25 Gram(s) IV Push once  dextrose 50% Injectable 25 Gram(s) IV Push once  heparin  Injectable 5000 Unit(s) SubCutaneous every 8 hours  insulin glargine Injectable (LANTUS) 10 Unit(s) SubCutaneous at bedtime  insulin lispro (HumaLOG) corrective regimen sliding scale   SubCutaneous three times a day before meals  labetalol 100 milliGRAM(s) Oral every 6 hours  lisinopril 10 milliGRAM(s) Oral every 12 hours  NIFEdipine XL 30 milliGRAM(s) Oral <User Schedule>  NIFEdipine XL 90 milliGRAM(s) Oral every 24 hours  pantoprazole    Tablet 40 milliGRAM(s) Oral before breakfast  senna 1 Tablet(s) Oral daily  sodium chloride 0.9% Bolus 500 milliLiter(s) IV Bolus once      7. Ancillary Management:   Chest PT[ ]   Head of bed >35 [ ]   Out of bed to chair [ X]   PT/OT/SP Eval [X ]   Spirometry[ X ]   DVT prophylaxis[X ] with Heparin 5000 units SQ q8h    8.Neuro:   Mental status: Awake, alert and oriented x 4. Attention and concentration intact. Fund of knowledge appropriate.  Language: Naming, repetition, fluency, and comprehension intact. + dysarthria, no aphasia.  Cranial nerves: Pupils equally round and reactive to light, visual fields full, no nystagmus, extraocular muscles intact, V1 through V3 intact bilaterally and symmetric, left facial droop    Motor: Normal bulk and tone, strength 5/5 in bilateral upper and lower extremities.   strength 5/5.  Rapid alternating movements intact and symmetric.   Sensation: Intact to light touch, proprioception, and pinprick.  No neglect.   Coordination: No dysmetria on finger-to-nose and heel-to-shin.  No clumsiness.  Reflexes: 2+ in upper and lower extremities, downgoing toes bilaterally  Gait: Narrow and steady. No ataxia.     NIH STROKE SCALE  Item	                                                        Score  1 a.	Level of Consciousness	               	0  1 b. LOC Questions	                                    0  1 c.	LOC Commands	                               	0  2.	Best Gaze	                                    0  3.	Visual	                                                0  4.	Facial Palsy	                                    1  5 a.	Motor Arm - Left	                        0  5 b.	Motor Arm - Right	                        0  6 a.	Motor Leg - Left	                        0  6 b.	Motor Leg - Right	                        0  7.	Limb Ataxia	                                    0  8.	Sensory	                                                0  9.	Language	                                     0  10.	Dysarthria	                                    0  11.	Extinction and Inattention  	            0  ______________________________________________________________________  TOTAL	                                                        1    mRS: baseline 0 ->1 No significant disability despite symptoms; able to carry out all usual duties and activities without assistance    Last CTH:  < from: CT Head No Cont (20 @ 08:41) >  IMPRESSION:   1. No midline shift, mass effect, or acute intracranial hemorrhage.  2. redemonstration of focal infarcts in bilateral basal ganglia and frontal corona radiata. Refer to MRI brain dated 4/15/2020 for full description of findings.    < end of copied text >  < from: CT Head No Cont (20 @ 13:14) >  IMPRESSION:   In comparison with the prior noncontrast CT scan of the brain dated 2020 time 10:27 AM:  Stable examination.  MRI of the brain is suggested for further evaluation not contraindicated in this patient.    < from: CT Brain Stroke Protocol (20 @ 10:35) >  IMPRESSION:  1.  No acute hemorrhage  2.  No acute territorial infarct  3.  Bilateral relatively symmetric diminished density within the white matter tracts of the corona radiata extending to external capsules. The appearance suggests ischemic change, but does not appear acute    Last CTA/CTP:  < from: CT Perfusion w/ Maps w/ IV Cont (20 @ 11:00) >  FINDINGS:  Aortic arch: There is a normal branching pattern. The origins of the great vessels are normal. The subclavian arteries are unremarkable.  Right carotid: The common carotid artery is normal. The bifurcation is normally patent. The cervical internal and external carotid arteries are normal.  Left carotid: The common carotid artery is unremarkable. The bifurcation is patent. The cervical internal and external carotid arteries are normal.    Anterior intracranial circulation:    Right ICA: The petrous, cavernous and supraclinoid portions of the ICA are normal. The ophthalmic artery is patent. There is a prominent posterior communicating supply to the posterior cerebral artery with hypoplastic P1. The M1, bifurcation and remaining branches of the middle cerebral artery are patent. The anterior cerebral arteryA1 segment is mildly hypoplastic.    Left ICA: The petrous, cavernous and supraclinoid portions of the ICA are patent. There may be mild narrowing of the para ophthalmic ICA. The ophthalmic artery and small posterior communicating arteries are patent. The middle cerebral artery, trifurcation and branches are patent. The anterior cerebral arteries patent.    The region of the anterior to indicating arteries normal.    Vertebral basilar vessels: The vertebral arteries are patent in the neck. The left vertebral artery is dominant. The right vertebral artery is small and terminates in a posterior inferior cerebellar artery. The left PICA is patent. The basilar artery, superior cerebellar and posterior cerebral arteries are patent.    The visualized brain parenchyma was evaluated on the routine head CT performed at same time.    Other findings: The submandibular and parotid glands are unremarkable. The thyroid gland is unremarkable.    The visualized upper aerodigestive tract is normal.    Therapid perfusion maps do not demonstrate evidence for brain ischemia using the Tmax > 6 sec and CBF < 30% mismatch volume criteria.    Images to the upper thorax demonstrates that the visualized pleural parenchyma and mediastinum are normal.    IMPRESSION:  1.  Patent carotid bifurcations.  2.  No large vessel occlusion  3.  No evidence for ischemia using rapid perfusion maps and the Tmax > 6 sec and CBF < 30% mismatch volume criteria.    Last MRI:   < from: MR Head No Cont (04.15.20 @ 09:36) >  FINDINGS:   There is restricted diffusion with corresponding T2/FLAIR hyperintense signal in the right basal ganglia extending to the right frontal corona radiata compatible with infarct.There are also additional foci of restricted diffusion in the right external capsule, in the right periatrial white matter, compatible with lacunar infarcts. There are foci of high signal in the left corona radiata on the B 1000 sequence with corresponding T2 and FLAIR hyperintense signal, but no dark signal on the ADC map, compatible with subacute infarcts. There are chronic right gangliocapsular infarcts. Scattered periventricular punctate T2 FLAIR hyperintensities consistent with microangiopathy.    Ventricles and sulci are normal in size and configuration. No extra axial collection. No mass effect or midline shift.  No evidence of hemorrhage on the gradient echo sequence. The major vessel flow voids are preserved at the skull base.  Cerebellar tonsils are in normal location.      No gross orbital mass. Mild mucosal thickening in bilateral ethmoid air cells and right frontal and left maxillary sinuses. Mastoid air cells are normal in signal. Bone marrow signal is unremarkable.    IMPRESSION: Bilateral focal/lacunar acute and subacute infarcts as detailed above compatible with cardioembolic infarcts. No hemorrhagic transformation.    Last TCD: NONE    Last EEG: NONE        9. Cardiovascular:   Vital Signs Last 24 Hrs  T(C): 37.9 (2020 12:39), Max: 39.2 (2020 04:44)  T(F): 100.3 (2020 12:39), Max: 102.5 (2020 04:44)  HR: 95 (2020 12:39) (84 - 106)  BP: 160/78 (2020 12:39) (143/83 - 177/86)  BP(mean): 111 (2020 12:39) (106 - 111)  RR: 17 (2020 10:49) (17 - 18)  SpO2: 99% (2020 10:49) (97% - 99%)     Last Echo:    Last EKG:    CVP   MAP/CPP/SBP target:   CO:      CI:       Enzymes/Trop:    10. Respiratory:   ABG:    VBG:    Chest Xray:        Peak Pressure/[place] Pressure:    11.GI:    Prophylaxis:     Bowel mvt:     Abd distension:   LIVER FUNCTIONS - ( 2020 04:51 )  Alb: 3.2 g/dL / Pro: 5.6 g/dL / ALK PHOS: 112 U/L / ALT: 140 U/L / AST: 107 U/L / GGT: x             12.Renal/Fluids/Electrolytes:        139  |  105  |  46<H>  ----------------------------<  127<H>  4.8   |  17  |  5.2<HH>    Ca    8.4<L>      2020 04:51    TPro  5.6<L>  /  Alb  3.2<L>  /  TBili  0.3  /  DBili  x   /  AST  107<H>  /  ALT  140<H>  /  AlkPhos  112        I&O's Detail      13.ID:   TMax:   Vital Signs Last 24 Hrs  T(C): 37.9 (2020 12:39), Max: 39.2 (2020 04:44)  T(F): 100.3 (2020 12:39), Max: 102.5 (2020 04:44)  HR: 95 (2020 12:39) (84 - 106)  BP: 160/78 (2020 12:39) (143/83 - 177/86)  BP(mean): 111 (2020 12:39) (106 - 111)  RR: 17 (2020 10:49) (17 - 18)  SpO2: 99% (2020 10:49) (97% - 99%)      Urinalysis Basic - ( 2020 09:20 )    Color: Yellow / Appearance: Slightly Turbid / S.020 / pH: x  Gluc: x / Ketone: Trace  / Bili: Negative / Urobili: <2 mg/dL   Blood: x / Protein: 300 mg/dL / Nitrite: Negative   Leuk Esterase: Negative / RBC: 3 /HPF / WBC 9 /HPF   Sq Epi: x / Non Sq Epi: 4 /HPF / Bacteria: Negative         Lines: Central[] Date inserted: Peripheral[]    14. Hematology:                         15.7   5.95  )-----------( 171      ( 2020 11:47 )             45.8          139  |  105  |  46<H>  ----------------------------<  127<H>  4.8   |  17  |  5.2<HH>    Ca    8.4<L>      2020 04:51    TPro  5.6<L>  /  Alb  3.2<L>  /  TBili  0.3  /  DBili  x   /  AST  107<H>  /  ALT  140<H>  /  AlkPhos  112           DVT Prophylaxis Lovenox[ ] Heparin[ ] Venodynes[ ] SCD's[ ] Neurocritical Care Progress Note:    1. 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    2. Today's Acute Problems: continued HTN, elevated creatinine, fevers overnight    3. 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 Of note, he also reports of body aches "as if I had the flu," + chills, abdominal discomfort without N/V. He denies chest pain, cough, HA or dysuria. In ED, he was found to have /128, initial NIHSS 4. Initial CT head negative for intracranial bleed and no evidence of stroke. Patient was started on nicardipine drip but it was subsequently d/rodger per neurology request due to worsening left sided weakness and for sudden drop in SBP to ~170s. He was also found to have elevated creatinine at 4 (no baseline).     4-Yesterday's Plan:    CNS:  - c/w  mg daily and Plavix 75 mg daily, and high dose statin    HEENT:   - Oral care    PULMONARY:  - CPAP at night for RAE    CARDIOVASCULAR:  - SBP target range 120-150  - c/w Lisinopril 10 mg PO Q12h (12pm/12am)  - Continue labetalol 100 mg PO Q6h prn for SBP >150  - c/w Nifedipine XL 30 mg QAM (6am) / 60 mg QPM (6pm)  - Notify neuro team if SBP <120    GI:  - GI prophylaxis: Protonix      RENAL:   - Per urology, may f/u outpatient for renal workup of left hydronephrosis  - Nephrology following, recommend increase nifedipine XL 90 mg daily if BP remain elevated,  - continue calcitriol 0.25 mg qd  - continue renal diet, monitor I/Os, encourage PO hydration  - protein/creat ratio urine 6.0 (elevated)  - f/u SPEP, UPEP    INFECTIOUS DISEASE:   -afebrile, no leukocytosis  -monitor daily CBC    HEMATOLOGICAL:    -DVT prophylaxis: subq heparin  - once BP stable, will start eliquis 2.5 mg po bid    ENDOCRINE:    - On d/c insulin lantus and linagliptin 5 mg QD    MUSCULOSKELETAL:   -encourage ambulation    5. Last 24 hour updates: Patient had continued HTN overnight to 170s, had fever of 102.5 at 5AM, creatinine gilma to 5.2 today, up from 4.8 yesterday.    6. Medications:   aspirin enteric coated 162 milliGRAM(s) Oral daily  atorvastatin 80 milliGRAM(s) Oral at bedtime  calcitriol   Capsule 0.25 MICROGram(s) Oral daily  chlorhexidine 4% Liquid 1 Application(s) Topical <User Schedule>  clopidogrel Tablet 75 milliGRAM(s) Oral daily  dextrose 5%. 1000 milliLiter(s) IV Continuous <Continuous>  dextrose 50% Injectable 12.5 Gram(s) IV Push once  dextrose 50% Injectable 25 Gram(s) IV Push once  dextrose 50% Injectable 25 Gram(s) IV Push once  heparin  Injectable 5000 Unit(s) SubCutaneous every 8 hours  insulin glargine Injectable (LANTUS) 10 Unit(s) SubCutaneous at bedtime  insulin lispro (HumaLOG) corrective regimen sliding scale   SubCutaneous three times a day before meals  labetalol 100 milliGRAM(s) Oral every 6 hours  lisinopril 10 milliGRAM(s) Oral every 12 hours  NIFEdipine XL 30 milliGRAM(s) Oral <User Schedule>  NIFEdipine XL 90 milliGRAM(s) Oral every 24 hours  pantoprazole    Tablet 40 milliGRAM(s) Oral before breakfast  senna 1 Tablet(s) Oral daily  sodium chloride 0.9% Bolus 500 milliLiter(s) IV Bolus once      7. Ancillary Management:   Chest PT[ ]   Head of bed >35 [ ]   Out of bed to chair [ X]   PT/OT/SP Eval [X ]   Spirometry[ X ]   DVT prophylaxis[X ] with Heparin 5000 units SQ q8h    8.Neuro:   Mental status: Awake, alert and oriented x 4. Attention and concentration intact. Fund of knowledge appropriate.  Language: Naming, repetition, fluency, and comprehension intact. + dysarthria, no aphasia.  Cranial nerves: Pupils equally round and reactive to light, visual fields full, no nystagmus, extraocular muscles intact, V1 through V3 intact bilaterally and symmetric, left facial droop    Motor: Normal bulk and tone, strength 5/5 in bilateral upper and lower extremities.   strength 5/5.  Rapid alternating movements intact and symmetric.   Sensation: Intact to light touch, proprioception, and pinprick.  No neglect.   Coordination: No dysmetria on finger-to-nose and heel-to-shin.  No clumsiness.  Reflexes: 2+ in upper and lower extremities, downgoing toes bilaterally  Gait: Narrow and steady. No ataxia.     NIH STROKE SCALE  Item	                                                        Score  1 a.	Level of Consciousness	               	0  1 b. LOC Questions	                                    0  1 c.	LOC Commands	                               	0  2.	Best Gaze	                                    0  3.	Visual	                                                0  4.	Facial Palsy	                                    1  5 a.	Motor Arm - Left	                        0  5 b.	Motor Arm - Right	                        0  6 a.	Motor Leg - Left	                        0  6 b.	Motor Leg - Right	                        0  7.	Limb Ataxia	                                    0  8.	Sensory	                                                0  9.	Language	                                     0  10.	Dysarthria	                                    0  11.	Extinction and Inattention  	            0  ______________________________________________________________________  TOTAL	                                                        1    mRS: baseline 0 ->1 No significant disability despite symptoms; able to carry out all usual duties and activities without assistance    Last CTH:  < from: CT Head No Cont (20 @ 08:41) >  IMPRESSION:   1. No midline shift, mass effect, or acute intracranial hemorrhage.  2. redemonstration of focal infarcts in bilateral basal ganglia and frontal corona radiata. Refer to MRI brain dated 4/15/2020 for full description of findings.    < end of copied text >  < from: CT Head No Cont (20 @ 13:14) >  IMPRESSION:   In comparison with the prior noncontrast CT scan of the brain dated 2020 time 10:27 AM:  Stable examination.  MRI of the brain is suggested for further evaluation not contraindicated in this patient.    < from: CT Brain Stroke Protocol (20 @ 10:35) >  IMPRESSION:  1.  No acute hemorrhage  2.  No acute territorial infarct  3.  Bilateral relatively symmetric diminished density within the white matter tracts of the corona radiata extending to external capsules. The appearance suggests ischemic change, but does not appear acute    Last CTA/CTP:  < from: CT Perfusion w/ Maps w/ IV Cont (20 @ 11:00) >  FINDINGS:  Aortic arch: There is a normal branching pattern. The origins of the great vessels are normal. The subclavian arteries are unremarkable.  Right carotid: The common carotid artery is normal. The bifurcation is normally patent. The cervical internal and external carotid arteries are normal.  Left carotid: The common carotid artery is unremarkable. The bifurcation is patent. The cervical internal and external carotid arteries are normal.    Anterior intracranial circulation:    Right ICA: The petrous, cavernous and supraclinoid portions of the ICA are normal. The ophthalmic artery is patent. There is a prominent posterior communicating supply to the posterior cerebral artery with hypoplastic P1. The M1, bifurcation and remaining branches of the middle cerebral artery are patent. The anterior cerebral arteryA1 segment is mildly hypoplastic.    Left ICA: The petrous, cavernous and supraclinoid portions of the ICA are patent. There may be mild narrowing of the para ophthalmic ICA. The ophthalmic artery and small posterior communicating arteries are patent. The middle cerebral artery, trifurcation and branches are patent. The anterior cerebral arteries patent.    The region of the anterior to indicating arteries normal.    Vertebral basilar vessels: The vertebral arteries are patent in the neck. The left vertebral artery is dominant. The right vertebral artery is small and terminates in a posterior inferior cerebellar artery. The left PICA is patent. The basilar artery, superior cerebellar and posterior cerebral arteries are patent.    The visualized brain parenchyma was evaluated on the routine head CT performed at same time.    Other findings: The submandibular and parotid glands are unremarkable. The thyroid gland is unremarkable.    The visualized upper aerodigestive tract is normal.    Therapid perfusion maps do not demonstrate evidence for brain ischemia using the Tmax > 6 sec and CBF < 30% mismatch volume criteria.    Images to the upper thorax demonstrates that the visualized pleural parenchyma and mediastinum are normal.    IMPRESSION:  1.  Patent carotid bifurcations.  2.  No large vessel occlusion  3.  No evidence for ischemia using rapid perfusion maps and the Tmax > 6 sec and CBF < 30% mismatch volume criteria.    Last MRI:   < from: MR Head No Cont (04.15.20 @ 09:36) >  FINDINGS:   There is restricted diffusion with corresponding T2/FLAIR hyperintense signal in the right basal ganglia extending to the right frontal corona radiata compatible with infarct.There are also additional foci of restricted diffusion in the right external capsule, in the right periatrial white matter, compatible with lacunar infarcts. There are foci of high signal in the left corona radiata on the B 1000 sequence with corresponding T2 and FLAIR hyperintense signal, but no dark signal on the ADC map, compatible with subacute infarcts. There are chronic right gangliocapsular infarcts. Scattered periventricular punctate T2 FLAIR hyperintensities consistent with microangiopathy.    Ventricles and sulci are normal in size and configuration. No extra axial collection. No mass effect or midline shift.  No evidence of hemorrhage on the gradient echo sequence. The major vessel flow voids are preserved at the skull base.  Cerebellar tonsils are in normal location.      No gross orbital mass. Mild mucosal thickening in bilateral ethmoid air cells and right frontal and left maxillary sinuses. Mastoid air cells are normal in signal. Bone marrow signal is unremarkable.    IMPRESSION: Bilateral focal/lacunar acute and subacute infarcts as detailed above compatible with cardioembolic infarcts. No hemorrhagic transformation.    Last TCD: NONE    Last EEG: NONE        9. Cardiovascular:   Vital Signs Last 24 Hrs  T(C): 37.9 (2020 12:39), Max: 39.2 (2020 04:44)  T(F): 100.3 (2020 12:39), Max: 102.5 (2020 04:44)  HR: 95 (2020 12:39) (84 - 106)  BP: 160/78 (2020 12:39) (143/83 - 177/86)  BP(mean): 111 (2020 12:39) (106 - 111)  RR: 17 (2020 10:49) (17 - 18)  SpO2: 99% (2020 10:49) (97% - 99%)   Last Echo:   < from: Transthoracic Echocardiogram (04.15.20 @ 09:54) >  Summary:   1. LV Ejection Fraction by Ortiz's Method with a biplane EF of 57 %.   2. Moderately increased LV wall thickness.   3. Spectral Doppler shows impaired relaxation pattern of left ventricular myocardial filling (Grade I diastolic dysfunction).   4. Mild mitral valve regurgitation.   5. Color flow doppler and intravenous injection of agitated saline demonstrates the presence of an intact intra atrial septum.    PHYSICIAN INTERPRETATION:  Left Ventricle: The left ventricular internal cavity size is normal. Left ventricular wall thickness is moderately increased. Spectral Doppler shows impaired relaxation pattern of left ventricular myocardial filling (Grade I diastolic dysfunction).  Right Ventricle: Normal right ventricular size and function.  Left Atrium: Mildly enlarged left atrium. Color flow doppler and intravenous injection of agitated saline demonstrates the presence of an intact intra atrial septum.  Right Atrium: Normal right atrial size.  Pericardium: There is no evidence of pericardial effusion.  Mitral Valve: No evidence of mitral stenosis. Mild mitral valve regurgitation is seen.  Tricuspid Valve: Mild tricuspid regurgitation isvisualized.  Aortic Valve: No evidence of aortic stenosis. Aortic valve thickening with normal leaflet opening. Trivial aortic valve regurgitation is seen.  Pulmonic Valve: Mild pulmonic valve regurgitation.  Aorta: The aortic root is normal in size and structure.  Venous: The inferior vena cava was normal sized, with respiratory size variation greater than 50%.  Shunts: Agitated saline contrast was given intravenously to evaluate for intracardiac shunting.    Last EKG:  < from: 12 Lead ECG (20 @ 16:59) >  Ventricular Rate 83 BPM  Atrial Rate 83 BPM  P-R Interval 174 ms  QRS Duration 90 ms  Q-T Interval 382 ms  QTC Calculation(Bezet) 448 ms  P Axis 40 degrees  R Axis 79 degrees  T Axis 27 degrees  Diagnosis Line Normal sinus rhythm  Normal ECG      SBP target: 120-150    Enzymes/Trop: Troponin T, Serum (20 @ 17:07)    Troponin T, Serum: 0.06: Critical value: ng/mL      10. Respiratory:   ABG: n/a    VBG: n/a    Chest Xray:  < from: Xray Chest 1 View- PORTABLE-Routine (20 @ 08:24) >    EXAM:  XR CHEST PORTABLE ROUTINE 1V            PROCEDURE DATE:  2020            INTERPRETATION:  Clinical History / Reason for exam: fever, cough    Comparison : Chest radiograph  2020    Technique/Positioning: Frontal view of the chest    Findings:    Cardiac/mediastinum/hilum: Stable.    Lung parenchyma/Pleura: Low lung volume. Left basilar linear opacity likely representing atelectasis. Clear right lung. No evidence of effusion or pneumothorax    Skeleton/soft tissues: Stable    Impression:      Low lung volume. Left basilar linear opacity likely representing subsegmental atelectasis.         SANGEETA LOUIS M.D., ATTENDING RADIOLOGIST  This document has been electronically signed. 2020  9:44AM        < end of copied text >        11.GI:    Prophylaxis:  Protonix 40 mg qd   Bowel mvt: yes   Abd distension: None    LIVER FUNCTIONS - ( 2020 04:51 )  Alb: 3.2 g/dL / Pro: 5.6 g/dL / ALK PHOS: 112 U/L / ALT: 140 U/L / AST: 107 U/L / GGT: x             12.Renal/Fluids/Electrolytes:        139  |  105  |  46<H>  ----------------------------<  127<H>  4.8   |  17  |  5.2<HH>    Ca    8.4<L>      2020 04:51    TPro  5.6<L>  /  Alb  3.2<L>  /  TBili  0.3  /  DBili  x   /  AST  107<H>  /  ALT  140<H>  /  AlkPhos  112        Protein/Creatinine Ratio, Urine (20 @ 18:20)    Protein/Creatinine Ratio Calculation: 6.0 Ratio    Creatinine, Random Urine: 24: Reference Ranges have NOT been established for random urine analytes due  to variability in fluid intake and concentration. mg/dL    Total Protein, Random Urine: 145 mg/dLG/24h    Protein Electrophoresis, Serum (20 @ 18:12)    Protein Total, Serum: 5.6 g/dL    Total Protein, Serum: 5.6 g/dL    Protein Electrophoresis, Urine (20 @ 19:00)    Creatinine, Urine ([place]): 124 mg/dL    Total Protein, Random Urine: 492: Test Repeated mg/dL    13.ID:   TMax:   Vital Signs Last 24 Hrs  T(C): 37.9 (2020 12:39), Max: 39.2 (2020 04:44)  T(F): 100.3 (2020 12:39), Max: 102.5 (2020 04:44)  HR: 95 (2020 12:39) (84 - 106)  BP: 160/78 (2020 12:39) (143/83 - 177/86)  BP(mean): 111 (2020 12:39) (106 - 111)  RR: 17 (2020 10:49) (17 - 18)  SpO2: 99% (2020 10:49) (97% - 99%)      Urinalysis Basic - ( 2020 09:20 )    Color: Yellow / Appearance: Slightly Turbid / S.020 / pH: x  Gluc: x / Ketone: Trace  / Bili: Negative / Urobili: <2 mg/dL   Blood: x / Protein: 300 mg/dL / Nitrite: Negative   Leuk Esterase: Negative / RBC: 3 /HPF / WBC 9 /HPF   Sq Epi: x / Non Sq Epi: 4 /HPF / Bacteria: Negative      Ferritin, Serum (04.15.20 @ 20:14)    Ferritin, Serum: 739 ng/mL    Lactate Dehydrogenase, Serum (20 @ 11:47)    Lactate Dehydrogenase, Serum: 286: Hemolyzed. Interpret with caution U/L    D-Dimer Assay, Quantitative (20 @ 11:47)    D-Dimer Assay, Quantitative: 335 ng/mL DDU    D-Dimer Assay, Quantitative (04.15.20 @ 20:14)    D-Dimer Assay, Quantitative: 266 ng/mL DDU    C-Reactive Protein, Serum (20 @ 16:00)    C-Reactive Protein, Serum: 0.20 mg/dL        Lines: Central[] Date inserted: Peripheral[x]    14. Hematology:                         15.7   5.95  )-----------( 171      ( 2020 11:47 )             45.8          139  |  105  |  46<H>  ----------------------------<  127<H>  4.8   |  17  |  5.2<HH>    Ca    8.4<L>      2020 04:51    TPro  5.6<L>  /  Alb  3.2<L>  /  TBili  0.3  /  DBili  x   /  AST  107<H>  /  ALT  140<H>  /  AlkPhos  112           DVT Prophylaxis Lovenox[ ] Heparin[ x ] Venodynes[ ] SCD's[ ]    Molecular Genetics - MTHFR Gene (04.15.20 @ 15:00)    MTHFR Gene Interpretation:     MTHFR gene; C677T mutation:   HETEROZYGOUS  MTHFR gene; [id number] mutation: HETEROZYGOUS    INTERPRETATION:  This patient is a compound heterozygote. This variant is associated with reduced MTHFR activity as  compared to normal individuals. Correlation with this patient's clinical condition and/or with  results from other relevant tests is suggested. Genetic counseling is recommended.      Activated Protein C Resistance Assay (04.15.20 @ 19:)    Activated Protein C Resistance Ratio: 2.95: APC  resistance ratio of <2.20  will be tested for Factor V  Leiden gene  mutation.  APC  resistance ratio of >=2.20 indicates no resistance to  activated Protein C. The presence of Refludan may falsely increase ratio. Ratio    Protein S Free Activity Assay (04.15.20 @ 19:)    Protein S Free Activity Assay: 104: Decreased levels of Protein S activity may be found in patients  with hereditary deficiency, warfarin therapy, vitamin k  deficiency, liver disease, DIC, or recent thrombosis as well as  after surgery. In addition, it may be physiologic in pregnancy.  An elevated Protein S activity is not clinically significant.  Only deficiencies are associated with an increased thrombotic  risk.    Protein C Functional Assay with Reflex (04.15.20 @ 19:)    Protein C Functional Assay with Reflex: 118: Protein C antigen will be performed only when the functional activity is  low. %

## 2020-04-22 NOTE — PROGRESS NOTE ADULT - ASSESSMENT
15. Impression:        16. Suggestions:        17. Disposition: 15. Impression:  A 49 years old right handed man with uncontrolled DM (A1C 9.2), DLD, RAE, Aflutter, unclear chronicity of severe left hydronephrosis who presents with slurred speech, left sided facial droop, and left sided hemiparesis, body aches and chills is found to have acute right lacunar infarcts in R basal ganglia, L subacute lacunar infarct, R posterior commissure infarct, corresponding to both anterior and posterior circulation. Etiology likely to be atypical embolic source, and anticoagulation is warranted once BP remain lauren to minimize hemorrhagic conversion. Prelim hypercoagulable work up negative. His COVID PCR was negative.       16. Suggestions:    CNS:  - c/w  mg daily and Plavix 75 mg daily, and high dose statin    HEENT:   - Oral care    PULMONARY:  - CPAP at night for RAE  - CXR shows: Low lung volume. Left basilar linear opacity likely representing subsegmental atelectasis  - incentive spirometry for atelectasis    CARDIOVASCULAR:  - SBP target range 120-150  - c/w Lisinopril 10 mg PO Q12h (12pm/12am)  - Continue labetalol 100 mg PO Q6h prn for SBP >150  - c/w Nifedipine XL 30 mg QAM (6am) / 60 mg QPM (6pm)  - Notify neuro team if SBP <120    GI:  - GI prophylaxis: Protonix      RENAL:   - Per urology, may f/u outpatient for renal workup of left hydronephrosis  - Nephrology following, recommend increase nifedipine XL 90 mg daily if BP remain elevated,  - continue calcitriol 0.25 mg qd  - continue renal diet, monitor I/Os, encourage PO hydration  - protein/creat ratio urine 6.0 (elevated)  - f/u SPEP, UPEP      INFECTIOUS DISEASE:   -afebrile, no leukocytosis  -monitor daily CBC    HEMATOLOGICAL:    -DVT prophylaxis: subq heparin  - once BP stable, will start eliquis 2.5 mg po bid    ENDOCRINE:    - On d/c insulin lantus and linagliptin 5 mg QD    MUSCULOSKELETAL:   -encourage ambulation    CODE STATUS: FULL CODE        17. Disposition:  -to home with home PT once BP stable

## 2020-04-22 NOTE — PROGRESS NOTE ADULT - SUBJECTIVE AND OBJECTIVE BOX
Nephrology progress note    Patient was seen and examined, events over the last 24 h noted .    Allergies:  No Known Allergies  pioglitazone (Diarrhea)    Hospital Medications:   MEDICATIONS  (STANDING):  aspirin enteric coated 162 milliGRAM(s) Oral daily  atorvastatin 80 milliGRAM(s) Oral at bedtime  calcitriol   Capsule 0.25 MICROGram(s) Oral daily  chlorhexidine 4% Liquid 1 Application(s) Topical <User Schedule>  clopidogrel Tablet 75 milliGRAM(s) Oral daily  dextrose 5%. 1000 milliLiter(s) (50 mL/Hr) IV Continuous <Continuous>  dextrose 50% Injectable 12.5 Gram(s) IV Push once  dextrose 50% Injectable 25 Gram(s) IV Push once  dextrose 50% Injectable 25 Gram(s) IV Push once  heparin  Injectable 5000 Unit(s) SubCutaneous every 8 hours  insulin glargine Injectable (LANTUS) 10 Unit(s) SubCutaneous at bedtime  insulin lispro (HumaLOG) corrective regimen sliding scale   SubCutaneous three times a day before meals  labetalol 100 milliGRAM(s) Oral every 6 hours  lisinopril 10 milliGRAM(s) Oral every 12 hours  NIFEdipine XL 30 milliGRAM(s) Oral <User Schedule>  NIFEdipine XL 90 milliGRAM(s) Oral every 24 hours  pantoprazole    Tablet 40 milliGRAM(s) Oral before breakfast  senna 1 Tablet(s) Oral daily  sodium chloride 0.9% Bolus 500 milliLiter(s) IV Bolus once        VITALS:  T(F): 97.5 (20 @ 07:56), Max: 102.5 (20 @ 04:44)  HR: 106 (20 @ 04:44)  BP: 146/89 (20 @ 04:44)  RR: 18 (20 @ 04:44)  SpO2: 97% (20 @ 16:36)  Wt(kg): --        PHYSICAL EXAM:  Constitutional: NAD  HEENT: anicteric sclera, oropharynx clear, MMM  Neck: No JVD  Respiratory: CTAB, no wheezes, rales or rhonchi  Cardiovascular: S1, S2, RRR  Gastrointestinal: BS+, soft, NT/ND  Extremities: No cyanosis or clubbing. No peripheral edema  :  No hinds.   Skin: No rashes    LABS:      139  |  105  |  46<H>  ----------------------------<  127<H>  4.8   |  17  |  5.2<HH>    Ca    8.4<L>      2020 04:51    TPro  5.6<L>  /  Alb  3.2<L>  /  TBili  0.3  /  DBili      /  AST  107<H>  /  ALT  140<H>  /  AlkPhos  112                            14.1   5.40  )-----------( 172      ( 2020 04:51 )             40.9       Urine Studies:  Urinalysis Basic - ( 2020 09:20 )    Color: Yellow / Appearance: Slightly Turbid / S.020 / pH:   Gluc:  / Ketone: Trace  / Bili: Negative / Urobili: <2 mg/dL   Blood:  / Protein: 300 mg/dL / Nitrite: Negative   Leuk Esterase: Negative / RBC: 3 /HPF / WBC 9 /HPF   Sq Epi:  / Non Sq Epi: 4 /HPF / Bacteria: Negative      Creatinine, Random Urine: 24 mg/dL ( @ 18:20)  Protein/Creatinine Ratio Calculation: 6.0 Ratio ( @ 18:20)  Protein/Creatinine Ratio Calculation: >5.4 Ratio ( @ 07:00)  Creatinine, Random Urine: 104 mg/dL ( @ 07:00)  Sodium, Random Urine: 82.0 mmoL/L (04-15 @ 11:40)  Creatinine, Random Urine: 148 mg/dL (04-15 @ 11:40)  Chloride, Random Urine: 79 (04-15 @ 11:40)  Potassium, Random Urine: 58 mmol/L (04-15 @ 11:40)    RADIOLOGY & ADDITIONAL STUDIES:

## 2020-04-22 NOTE — PROGRESS NOTE ADULT - ASSESSMENT
48 y/o M with JORGE in hospital for slurred speech, left side facial droop, SOB, chills but afebrile, body aches.  PMH DM, RAE, Proteinuria    # JORGE: no baseline creatinine available. last OP labs ~ 5 years ago. poor compliance with meds./ most likely JORGE on CKD obstructive in nature and progressing over time as evidenced by CT findings   # etiology most likely chronic left obstruction with ATN now   # CT noted,  seen by , planned for lasix scan   # no CRISTÓBAL   # ph at goal , pth noted started calcitriol 0.25 q 24   > 6 grams protienuria  hx of diabetes  check SPEP UPEP (though unlikely MM given Hgb 14)   [facility] Hepatitis and HICV serologies   # h/h at goal, no KATARINA   # BP  imrpoved  # slurred speech, facial drooping, weakness improved, followed by neurology   # no acute indication for RRT     Ok for d/c from renal standpoint  will need outpt Renal f/u   i

## 2020-04-22 NOTE — CHART NOTE - NSCHARTNOTEFT_GEN_A_CORE
was notified by nurse that patient is running fever 102.7    Ordered UA, CXR, and Blood cultures was notified by nurse that patient is running fever 102.7    Ordered UA, CXR, and Blood cultures     Covid 19 pcr on 04/13 was negative, might need repeat.

## 2020-04-23 PROBLEM — R80.9 PROTEINURIA, UNSPECIFIED: Chronic | Status: ACTIVE | Noted: 2020-04-13

## 2020-04-23 PROBLEM — Z00.00 ENCOUNTER FOR PREVENTIVE HEALTH EXAMINATION: Status: ACTIVE | Noted: 2020-04-23

## 2020-04-23 LAB
% GAMMA, URINE: 8.4 % — SIGNIFICANT CHANGE UP
ALBUMIN 24H MFR UR ELPH: 65.3 % — SIGNIFICANT CHANGE UP
ALPHA1 GLOB 24H MFR UR ELPH: 3 % — SIGNIFICANT CHANGE UP
ALPHA2 GLOB 24H MFR UR ELPH: 13.3 % — SIGNIFICANT CHANGE UP
B-GLOBULIN 24H MFR UR ELPH: 10 % — SIGNIFICANT CHANGE UP
INTERPRETATION 24H UR IFE-IMP: SIGNIFICANT CHANGE UP
INTERPRETATION 24H UR IFE-IMP: SIGNIFICANT CHANGE UP
M PROTEIN 24H UR ELPH-MRATE: SIGNIFICANT CHANGE UP
PROT ?TM UR-MCNC: 420 MG/DL — HIGH (ref 0–12)
PROT PATTERN 24H UR ELPH-IMP: SIGNIFICANT CHANGE UP
TOTAL VOLUME - 24 HOUR: SIGNIFICANT CHANGE UP ML
URINE CREATININE CALCULATION: SIGNIFICANT CHANGE UP G/24 H (ref 1–2)

## 2020-04-24 DIAGNOSIS — E66.9 OBESITY, UNSPECIFIED: ICD-10-CM

## 2020-04-24 DIAGNOSIS — E11.22 TYPE 2 DIABETES MELLITUS WITH DIABETIC CHRONIC KIDNEY DISEASE: ICD-10-CM

## 2020-04-24 DIAGNOSIS — I48.92 UNSPECIFIED ATRIAL FLUTTER: ICD-10-CM

## 2020-04-24 DIAGNOSIS — R47.1 DYSARTHRIA AND ANARTHRIA: ICD-10-CM

## 2020-04-24 DIAGNOSIS — I63.9 CEREBRAL INFARCTION, UNSPECIFIED: ICD-10-CM

## 2020-04-24 DIAGNOSIS — G81.94 HEMIPLEGIA, UNSPECIFIED AFFECTING LEFT NONDOMINANT SIDE: ICD-10-CM

## 2020-04-24 DIAGNOSIS — N13.0 HYDRONEPHROSIS WITH URETEROPELVIC JUNCTION OBSTRUCTION: ICD-10-CM

## 2020-04-24 DIAGNOSIS — E78.5 HYPERLIPIDEMIA, UNSPECIFIED: ICD-10-CM

## 2020-04-24 DIAGNOSIS — G47.33 OBSTRUCTIVE SLEEP APNEA (ADULT) (PEDIATRIC): ICD-10-CM

## 2020-04-24 DIAGNOSIS — E11.65 TYPE 2 DIABETES MELLITUS WITH HYPERGLYCEMIA: ICD-10-CM

## 2020-04-24 DIAGNOSIS — I16.1 HYPERTENSIVE EMERGENCY: ICD-10-CM

## 2020-04-24 DIAGNOSIS — N17.0 ACUTE KIDNEY FAILURE WITH TUBULAR NECROSIS: ICD-10-CM

## 2020-04-24 DIAGNOSIS — R13.10 DYSPHAGIA, UNSPECIFIED: ICD-10-CM

## 2020-04-24 DIAGNOSIS — I48.0 PAROXYSMAL ATRIAL FIBRILLATION: ICD-10-CM

## 2020-04-24 DIAGNOSIS — N18.9 CHRONIC KIDNEY DISEASE, UNSPECIFIED: ICD-10-CM

## 2020-04-24 DIAGNOSIS — R29.810 FACIAL WEAKNESS: ICD-10-CM

## 2020-04-24 LAB
% ALBUMIN: 51.9 % — SIGNIFICANT CHANGE UP
% ALPHA 1: 5.6 % — SIGNIFICANT CHANGE UP
% ALPHA 2: 14.7 % — SIGNIFICANT CHANGE UP
% BETA: 15.4 % — SIGNIFICANT CHANGE UP
% GAMMA: 12.4 % — SIGNIFICANT CHANGE UP
ALBUMIN SERPL ELPH-MCNC: 2.9 G/DL — LOW (ref 3.6–5.5)
ALBUMIN/GLOB SERPL ELPH: 1.1 RATIO — SIGNIFICANT CHANGE UP
ALPHA1 GLOB SERPL ELPH-MCNC: 0.3 G/DL — SIGNIFICANT CHANGE UP (ref 0.1–0.4)
ALPHA2 GLOB SERPL ELPH-MCNC: 0.8 G/DL — SIGNIFICANT CHANGE UP (ref 0.5–1)
B-GLOBULIN SERPL ELPH-MCNC: 0.9 G/DL — SIGNIFICANT CHANGE UP (ref 0.5–1)
GAMMA GLOBULIN: 0.7 G/DL — SIGNIFICANT CHANGE UP (ref 0.6–1.6)
PROT PATTERN SERPL ELPH-IMP: SIGNIFICANT CHANGE UP

## 2020-04-27 LAB
CULTURE RESULTS: SIGNIFICANT CHANGE UP
CULTURE RESULTS: SIGNIFICANT CHANGE UP
SPECIMEN SOURCE: SIGNIFICANT CHANGE UP
SPECIMEN SOURCE: SIGNIFICANT CHANGE UP

## 2020-04-30 ENCOUNTER — APPOINTMENT (OUTPATIENT)
Dept: NEUROLOGY | Facility: CLINIC | Age: 50
End: 2020-04-30

## 2020-05-14 RX ORDER — LISINOPRIL 10 MG/1
10 TABLET ORAL DAILY
Qty: 30 | Refills: 0 | Status: ACTIVE | COMMUNITY
Start: 2020-05-14

## 2020-05-19 ENCOUNTER — APPOINTMENT (OUTPATIENT)
Dept: UROLOGY | Facility: CLINIC | Age: 50
End: 2020-05-19
Payer: COMMERCIAL

## 2020-05-19 VITALS
SYSTOLIC BLOOD PRESSURE: 127 MMHG | DIASTOLIC BLOOD PRESSURE: 84 MMHG | TEMPERATURE: 96.4 F | HEART RATE: 100 BPM | HEIGHT: 69 IN | BODY MASS INDEX: 31.1 KG/M2 | WEIGHT: 210 LBS

## 2020-05-19 VITALS — HEIGHT: 69 IN | WEIGHT: 210 LBS | BODY MASS INDEX: 31.1 KG/M2

## 2020-05-19 DIAGNOSIS — Z80.42 FAMILY HISTORY OF MALIGNANT NEOPLASM OF PROSTATE: ICD-10-CM

## 2020-05-19 DIAGNOSIS — E11.9 TYPE 2 DIABETES MELLITUS W/OUT COMPLICATIONS: ICD-10-CM

## 2020-05-19 DIAGNOSIS — Z82.49 FAMILY HISTORY OF ISCHEMIC HEART DISEASE AND OTHER DISEASES OF THE CIRCULATORY SYSTEM: ICD-10-CM

## 2020-05-19 DIAGNOSIS — Z78.9 OTHER SPECIFIED HEALTH STATUS: ICD-10-CM

## 2020-05-19 PROCEDURE — 99214 OFFICE O/P EST MOD 30 MIN: CPT

## 2020-05-19 NOTE — HISTORY OF PRESENT ILLNESS
[FreeTextEntry1] : Poorly controlled diabetes, recently hospitalized with CVA, had elevated inflammatory markers, transaminitis, creatinine 5.2, Covid negative, was found and CAT scan to have a left ureteropelvic junction obstruction with very thin parenchyma. He has no urinary complaints. GFR is 11. He is currently not on dialysis. There's no hematuria, no dysuria, frequency or urgency

## 2020-05-19 NOTE — PHYSICAL EXAM
[Normal Appearance] : normal appearance [General Appearance - In No Acute Distress] : no acute distress [Edema] : no peripheral edema [Costovertebral Angle Tenderness] : no ~M costovertebral angle tenderness [] : no respiratory distress [Oriented To Time, Place, And Person] : oriented to person, place, and time [Normal Station and Gait] : the gait and station were normal for the patient's age

## 2020-05-19 NOTE — REVIEW OF SYSTEMS
[Fever] : no fever [Feeling Poorly] : not feeling poorly [Heart Rate Is Slow] : the heart rate was not slow [Shortness Of Breath] : no shortness of breath [Chest Pain] : no chest pain [Cough] : no cough [Constipation] : no constipation [Diarrhea] : no diarrhea [Dysuria] : no dysuria [Confused] : no confusion

## 2020-05-19 NOTE — ASSESSMENT
[FreeTextEntry1] : Left UPJ obstruction with very atrophic left kidney and severe hydronephrosis. We'll get a renal scan with Lasix and determine if there is any viability to be pursued [Hydronephrosis (591\N13.30)] : Salter-Benton type I

## 2020-05-20 ENCOUNTER — APPOINTMENT (OUTPATIENT)
Dept: NEUROLOGY | Facility: CLINIC | Age: 50
End: 2020-05-20
Payer: COMMERCIAL

## 2020-05-20 PROCEDURE — 99214 OFFICE O/P EST MOD 30 MIN: CPT | Mod: 95

## 2020-05-20 NOTE — REVIEW OF SYSTEMS
[As Noted in HPI] : as noted in HPI [As noted in HPI] : as noted in HPI [Joint Pain] : joint pain [Negative] : Integumentary [FreeTextEntry7] : diarreha when on acots

## 2020-05-20 NOTE — HISTORY OF PRESENT ILLNESS
[FreeTextEntry1] : [facility] but due too poor connection was converted to Facetime video visit.  Consent obtain from patient for the visit.\par \par Mr. rOtiz is being seen for follow up from his stroke in the end of April 2020.  He presented with left sided weakness and was found to have b/l L>R strokes suggestive of embolic etiology.  He has a history of aflutter but was told nothing to worry about.  He had poorly controlled LDL of 199 and hgba1c of 9.1.  Since leaving the hospital his glucose has improved significantly and his hgba1c is down to 7.2.  He still has left hemipareiss which has improved from when he was in hospital but still affects his coordination with his left hand predominantly.\par He was placed on ASA/Plavix and statin and the decision to start eliquis was deferred for out patient\par He has not done PT/OT for fear of COVID19\par He has been more fatigued recently.\par Hypercoagulable labs in hospital were unremarkable\par CTA was unremarkable\par

## 2020-05-20 NOTE — DATA REVIEWED
[de-identified] : MRI brain images reviewed during the visit [de-identified] : Hospital notes and Labs reviewed during the visit

## 2020-05-20 NOTE — PHYSICAL EXAM
[FreeTextEntry1] : a+Ox3 naming and repetition normal\par Slight left facial\par tongue midline\par FTN slower on left CARSON slower on left\par Gait wide based and slightly off \par \par NIHSS 1

## 2020-05-20 NOTE — DISCUSSION/SUMMARY
[FreeTextEntry1] : Mr. Ortiz is a 50yo man with recent b/l strokes and history of afib and found to have multiple vascular risk factors.  \par 1. EPS evaluation for possible switching to ELiquis and stopping plavix\par 2. Continue with lipid and glucose control\par 3. SBP <130-140\par 4. PT/OT referrals\par 5. F/u in 2 months

## 2020-05-27 ENCOUNTER — OUTPATIENT (OUTPATIENT)
Dept: OUTPATIENT SERVICES | Facility: HOSPITAL | Age: 50
LOS: 1 days | Discharge: HOME | End: 2020-05-27
Payer: COMMERCIAL

## 2020-05-27 DIAGNOSIS — N13.30 UNSPECIFIED HYDRONEPHROSIS: ICD-10-CM

## 2020-05-27 PROCEDURE — 78708 K FLOW/FUNCT IMAGE W/DRUG: CPT | Mod: 26

## 2020-06-08 ENCOUNTER — APPOINTMENT (OUTPATIENT)
Dept: UROLOGY | Facility: CLINIC | Age: 50
End: 2020-06-08
Payer: COMMERCIAL

## 2020-06-08 DIAGNOSIS — N13.30 UNSPECIFIED HYDRONEPHROSIS: ICD-10-CM

## 2020-06-08 PROCEDURE — 99213 OFFICE O/P EST LOW 20 MIN: CPT | Mod: 95

## 2020-06-08 NOTE — ASSESSMENT
[Hydronephrosis (591\N13.30)] : Salter-Benton type I [FreeTextEntry1] : Discussed fully with pt severe impairment of function from atrophic, hydronephrotic left kidney likely from congenital upj Likely cannot improve upon function by repairing.  rec expectant treatment.  Nephrology follow up

## 2020-06-08 NOTE — PHYSICAL EXAM
[Costovertebral Angle Tenderness] : no ~M costovertebral angle tenderness [General Appearance - In No Acute Distress] : no acute distress [Edema] : no peripheral edema [] : no respiratory distress [Oriented To Time, Place, And Person] : oriented to person, place, and time [Normal Station and Gait] : the gait and station were normal for the patient's age

## 2020-06-08 NOTE — REVIEW OF SYSTEMS
[Feeling Poorly] : not feeling poorly [Chest Pain] : no chest pain [Constipation] : no constipation [Shortness Of Breath] : no shortness of breath [Cough] : no cough [Dysuria] : no dysuria [Diarrhea] : no diarrhea [Confused] : no confusion

## 2020-07-09 NOTE — PROGRESS NOTE ADULT - PROVIDER SPECIALTY LIST ADULT
[address] [address]                                    After Visit Summary   7/9/2020    Judith Nelson    MRN: [id number]           After Visit Summary Signature Page    I have received my discharge instructions, and my questions have been answered. I have discussed any challenges I see with this plan with the nurse or doctor.    ..........................................................................................................................................  Patient/Patient Representative Signature      ..........................................................................................................................................  Patient Representative Print Name and Relationship to Patient    ..................................................               ................................................  Date                                   Time    ..........................................................................................................................................  Reviewed by Signature/Title    ...................................................              ..............................................  Date                                               Time          22EPIC Rev 08/18        Endocrinology

## 2020-07-17 ENCOUNTER — TRANSCRIPTION ENCOUNTER (OUTPATIENT)
Age: 50
End: 2020-07-17

## 2020-07-30 ENCOUNTER — APPOINTMENT (OUTPATIENT)
Dept: CARDIOLOGY | Facility: CLINIC | Age: 50
End: 2020-07-30
Payer: COMMERCIAL

## 2020-07-30 VITALS
DIASTOLIC BLOOD PRESSURE: 80 MMHG | HEIGHT: 69 IN | HEART RATE: 82 BPM | WEIGHT: 195 LBS | SYSTOLIC BLOOD PRESSURE: 120 MMHG | BODY MASS INDEX: 28.88 KG/M2 | TEMPERATURE: 97.8 F

## 2020-07-30 DIAGNOSIS — I10 ESSENTIAL (PRIMARY) HYPERTENSION: ICD-10-CM

## 2020-07-30 PROCEDURE — 99214 OFFICE O/P EST MOD 30 MIN: CPT

## 2020-07-30 PROCEDURE — 93000 ELECTROCARDIOGRAM COMPLETE: CPT

## 2020-07-30 NOTE — PHYSICAL EXAM
[General Appearance - Well Developed] : well developed [General Appearance - Well Nourished] : well nourished [Normal Conjunctiva] : the conjunctiva exhibited no abnormalities [Normal Oral Mucosa] : normal oral mucosa [JVD Elevated _____cm] : JVD elevated [unfilled] ~U cm above clavicle [Normal Oropharynx] : normal oropharynx [Respiration, Rhythm And Depth] : normal respiratory rhythm and effort [Auscultation Breath Sounds / Voice Sounds] : lungs were clear to auscultation bilaterally [Abdomen Soft] : soft [Bowel Sounds] : normal bowel sounds [Nail Clubbing] : no clubbing of the fingernails [Abnormal Walk] : normal gait [Cyanosis, Localized] : no localized cyanosis [Skin Color & Pigmentation] : normal skin color and pigmentation [Skin Turgor] : normal skin turgor [] : no rash [Impaired Insight] : insight and judgment were intact [Oriented To Time, Place, And Person] : oriented to person, place, and time

## 2020-08-09 PROBLEM — I10 HYPERTENSION: Status: ACTIVE | Noted: 2020-05-14

## 2020-08-09 NOTE — ASSESSMENT
[FreeTextEntry1] : Mr Angel presents for further evaluation of CVA . He was referred for monitoring for cryptogenic CVA as arrhythmia such as AFib may be the etiology of his recent CVA . \par Loop recorder implantation discussed with the patient and his wife in details including benefits/risks/alternative. Risk involved is less than 1% of bleeding and infection. We also discussed remote monitoring in detail. All questions were answered and the patient would like to proceed with loop implant.\par In addition, we recommend a INDY to r/o PFO/SUNITA thrombus. Risk and benefits discussed . He verbalized full understanding and agreed with plan of care.   \par \par He reports c/w meds , currently on Asa and Plavix \par BP well controlled. \par \par \par Plan\par -Loop recorder implant \par -INDY with Dr. Browning \par -Continue current medications \par -GI work-up as scheduled \par -F/U with the stroke clinic as scheduled \par -RTO in 6-8  weeks \par  \par \par \par \par \par \par \par \par \par

## 2020-08-09 NOTE — HISTORY OF PRESENT ILLNESS
[FreeTextEntry1] : 51 y/o female with phm oh DM, hyperlipidemia, HTN,CKD, RAE ,  admitted to [facility] in April 2020 for left sided weakness and was found to have b/l L>R strokes suggestive of embolic etiology. \par He reports hx of being told by his cardiologist 10 years ago, that he has  "flutter" and "not to worry about it" , however no prior records of previous ECGs available. \par In hospital hypercoagulable work-up was negative. \par Referred for long term arrhythmia monitoring to r/o Afib. \par \par He presents with his wife . \par Denies CP, shortness of breath or palpitations.  Reports occasional dizziness, still has coordination issues with his left hand . Denies YORK . Reports recent drop in Hgb, seen by GI , pending GI work-up \par \par ECG ( 7/30/2020): NSR at 82 bpm, normal ME, QRS, QTc\par \par In hospital ECHO( 4/2020) \par Summary:\par  1. LV Ejection Fraction by Ortiz's Method with a biplane EF of 57 %.\par  2. Moderately increased LV wall thickness.\par  3. Spectral Doppler shows impaired relaxation pattern of left ventricular myocardial filling (Grade I diastolic dysfunction).\par  4. Mild mitral valve regurgitation.\par  5. Color flow doppler and intravenous injection of agitated saline demonstrates the presence of an intact intra atrial septum.\par \par

## 2020-08-12 ENCOUNTER — APPOINTMENT (OUTPATIENT)
Dept: GASTROENTEROLOGY | Facility: CLINIC | Age: 50
End: 2020-08-12

## 2020-08-13 ENCOUNTER — OUTPATIENT (OUTPATIENT)
Dept: OUTPATIENT SERVICES | Facility: HOSPITAL | Age: 50
LOS: 1 days | Discharge: HOME | End: 2020-08-13
Payer: COMMERCIAL

## 2020-08-13 VITALS
OXYGEN SATURATION: 100 % | TEMPERATURE: 98 F | DIASTOLIC BLOOD PRESSURE: 90 MMHG | WEIGHT: 188.05 LBS | HEIGHT: 69 IN | RESPIRATION RATE: 17 BRPM | HEART RATE: 67 BPM | SYSTOLIC BLOOD PRESSURE: 148 MMHG

## 2020-08-13 DIAGNOSIS — Z01.818 ENCOUNTER FOR OTHER PREPROCEDURAL EXAMINATION: ICD-10-CM

## 2020-08-13 DIAGNOSIS — I63.9 CEREBRAL INFARCTION, UNSPECIFIED: ICD-10-CM

## 2020-08-13 LAB
ALBUMIN SERPL ELPH-MCNC: 4.5 G/DL — SIGNIFICANT CHANGE UP (ref 3.5–5.2)
ALP SERPL-CCNC: 110 U/L — SIGNIFICANT CHANGE UP (ref 30–115)
ALT FLD-CCNC: 17 U/L — SIGNIFICANT CHANGE UP (ref 0–41)
ANION GAP SERPL CALC-SCNC: 11 MMOL/L — SIGNIFICANT CHANGE UP (ref 7–14)
APPEARANCE UR: CLEAR — SIGNIFICANT CHANGE UP
APTT BLD: 28.1 SEC — SIGNIFICANT CHANGE UP (ref 27–39.2)
AST SERPL-CCNC: 19 U/L — SIGNIFICANT CHANGE UP (ref 0–41)
BACTERIA # UR AUTO: NEGATIVE — SIGNIFICANT CHANGE UP
BASOPHILS # BLD AUTO: 0.06 K/UL — SIGNIFICANT CHANGE UP (ref 0–0.2)
BASOPHILS NFR BLD AUTO: 0.9 % — SIGNIFICANT CHANGE UP (ref 0–1)
BILIRUB SERPL-MCNC: 0.3 MG/DL — SIGNIFICANT CHANGE UP (ref 0.2–1.2)
BILIRUB UR-MCNC: NEGATIVE — SIGNIFICANT CHANGE UP
BUN SERPL-MCNC: 44 MG/DL — HIGH (ref 10–20)
CALCIUM SERPL-MCNC: 9.9 MG/DL — SIGNIFICANT CHANGE UP (ref 8.5–10.1)
CHLORIDE SERPL-SCNC: 110 MMOL/L — SIGNIFICANT CHANGE UP (ref 98–110)
CO2 SERPL-SCNC: 19 MMOL/L — SIGNIFICANT CHANGE UP (ref 17–32)
COLOR SPEC: SIGNIFICANT CHANGE UP
CREAT SERPL-MCNC: 4.5 MG/DL — CRITICAL HIGH (ref 0.7–1.5)
DIFF PNL FLD: NEGATIVE — SIGNIFICANT CHANGE UP
EOSINOPHIL # BLD AUTO: 0.25 K/UL — SIGNIFICANT CHANGE UP (ref 0–0.7)
EOSINOPHIL NFR BLD AUTO: 3.8 % — SIGNIFICANT CHANGE UP (ref 0–8)
EPI CELLS # UR: 0 /HPF — SIGNIFICANT CHANGE UP (ref 0–5)
GLUCOSE SERPL-MCNC: 95 MG/DL — SIGNIFICANT CHANGE UP (ref 70–99)
GLUCOSE UR QL: NEGATIVE — SIGNIFICANT CHANGE UP
HCT VFR BLD CALC: 29.9 % — LOW (ref 42–52)
HGB BLD-MCNC: 10 G/DL — LOW (ref 14–18)
HYALINE CASTS # UR AUTO: 3 /LPF — SIGNIFICANT CHANGE UP (ref 0–7)
IMM GRANULOCYTES NFR BLD AUTO: 0.2 % — SIGNIFICANT CHANGE UP (ref 0.1–0.3)
INR BLD: 0.92 RATIO — SIGNIFICANT CHANGE UP (ref 0.65–1.3)
KETONES UR-MCNC: NEGATIVE — SIGNIFICANT CHANGE UP
LEUKOCYTE ESTERASE UR-ACNC: NEGATIVE — SIGNIFICANT CHANGE UP
LYMPHOCYTES # BLD AUTO: 1.84 K/UL — SIGNIFICANT CHANGE UP (ref 1.2–3.4)
LYMPHOCYTES # BLD AUTO: 28 % — SIGNIFICANT CHANGE UP (ref 20.5–51.1)
MCHC RBC-ENTMCNC: 29.5 PG — SIGNIFICANT CHANGE UP (ref 27–31)
MCHC RBC-ENTMCNC: 33.4 G/DL — SIGNIFICANT CHANGE UP (ref 32–37)
MCV RBC AUTO: 88.2 FL — SIGNIFICANT CHANGE UP (ref 80–94)
MONOCYTES # BLD AUTO: 0.59 K/UL — SIGNIFICANT CHANGE UP (ref 0.1–0.6)
MONOCYTES NFR BLD AUTO: 9 % — SIGNIFICANT CHANGE UP (ref 1.7–9.3)
MRSA PCR RESULT.: NEGATIVE — SIGNIFICANT CHANGE UP
NEUTROPHILS # BLD AUTO: 3.81 K/UL — SIGNIFICANT CHANGE UP (ref 1.4–6.5)
NEUTROPHILS NFR BLD AUTO: 58.1 % — SIGNIFICANT CHANGE UP (ref 42.2–75.2)
NITRITE UR-MCNC: NEGATIVE — SIGNIFICANT CHANGE UP
NRBC # BLD: 0 /100 WBCS — SIGNIFICANT CHANGE UP (ref 0–0)
PH UR: 6 — SIGNIFICANT CHANGE UP (ref 5–8)
PLATELET # BLD AUTO: 256 K/UL — SIGNIFICANT CHANGE UP (ref 130–400)
POTASSIUM SERPL-MCNC: 4.7 MMOL/L — SIGNIFICANT CHANGE UP (ref 3.5–5)
POTASSIUM SERPL-SCNC: 4.7 MMOL/L — SIGNIFICANT CHANGE UP (ref 3.5–5)
PROT SERPL-MCNC: 6.6 G/DL — SIGNIFICANT CHANGE UP (ref 6–8)
PROT UR-MCNC: ABNORMAL
PROTHROM AB SERPL-ACNC: 10.6 SEC — SIGNIFICANT CHANGE UP (ref 9.95–12.87)
RBC # BLD: 3.39 M/UL — LOW (ref 4.7–6.1)
RBC # FLD: 13.5 % — SIGNIFICANT CHANGE UP (ref 11.5–14.5)
RBC CASTS # UR COMP ASSIST: 2 /HPF — SIGNIFICANT CHANGE UP (ref 0–4)
SODIUM SERPL-SCNC: 140 MMOL/L — SIGNIFICANT CHANGE UP (ref 135–146)
SP GR SPEC: 1.02 — SIGNIFICANT CHANGE UP (ref 1.01–1.02)
UROBILINOGEN FLD QL: SIGNIFICANT CHANGE UP
WBC # BLD: 6.56 K/UL — SIGNIFICANT CHANGE UP (ref 4.8–10.8)
WBC # FLD AUTO: 6.56 K/UL — SIGNIFICANT CHANGE UP (ref 4.8–10.8)
WBC UR QL: 1 /HPF — SIGNIFICANT CHANGE UP (ref 0–5)

## 2020-08-13 PROCEDURE — 93010 ELECTROCARDIOGRAM REPORT: CPT

## 2020-08-13 NOTE — H&P PST ADULT - NSICDXPASTMEDICALHX_GEN_ALL_CORE_FT
PAST MEDICAL HISTORY:  Anemia EDIS    Chronic kidney disease (CKD), stage V     DM (diabetes mellitus) RECENT HGB AIC 5.6 AT [facility]    GI bleed 5/2020    Hyperparathyroidism     Ischemic cerebrovascular accident (CVA) 4/2020    RAE (obstructive sleep apnea) NO CPAP    Proteinuria

## 2020-08-13 NOTE — H&P PST ADULT - HISTORY OF PRESENT ILLNESS
PATIENT DENIES CHEST PAIN, SHORTNESS OF BREATH, PALPITATIONS, COUGHING, FEVER, DYSURIA.  CAN WALK UP 1 FLIGHTS OF STEPS WITHOUT SOB, AND REPORTS YORK.

## 2020-08-13 NOTE — H&P PST ADULT - NSICDXFAMILYHX_GEN_ALL_CORE_FT
FAMILY HISTORY:  FH: CAD (coronary artery disease), MOM  FH: HTN (hypertension), DAD  FHx: cancer of prostate, MATERNAL GRANDPA

## 2020-08-13 NOTE — H&P PST ADULT - PRO PAIN LIFE ADAPT
HPI:    Patient ID: Edy Spann is a 62year old male.   Patient patient presents today for physical exam, states doing well otherwise, denies chest pain, shortness of breath, dyspnea on exertion or heart palpitations also denies nausea, vomiting, diarrhea Tympanic membrane, external ear and ear canal normal.   Left Ear: Tympanic membrane, external ear and ear canal normal.   Nose: Nose normal. Right sinus exhibits no maxillary sinus tenderness and no frontal sinus tenderness.  Left sinus exhibits no maxillar walking /exercise as  tolerated  · Counseling on ideal weight  · Pt   educaiton  Pt refusion   medication   · Labs in  6  motnhs - pt   Advised   To loose  5 -10  pounds     Elevated psa  Refer  To urology - pt  Seeing  Dr Raegan Gallegos  From [facility]  For decreased activity level

## 2020-08-13 NOTE — H&P PST ADULT - REASON FOR ADMISSION
51 Y/O MALE HERE FOR PRE-ADMISSION SURGICAL TESTING. PATIENT REPORTS HE HAD AN ISCHEMIC CVA 4/20. NOW NEEDS A LOOP RECORDER AND A INDY.  NOW FOR SCHEDULED PROCEDURE.

## 2020-08-22 ENCOUNTER — OUTPATIENT (OUTPATIENT)
Dept: OUTPATIENT SERVICES | Facility: HOSPITAL | Age: 50
LOS: 1 days | Discharge: HOME | End: 2020-08-22

## 2020-08-22 DIAGNOSIS — Z11.59 ENCOUNTER FOR SCREENING FOR OTHER VIRAL DISEASES: ICD-10-CM

## 2020-08-22 PROBLEM — N18.5 CHRONIC KIDNEY DISEASE, STAGE 5: Chronic | Status: ACTIVE | Noted: 2020-08-13

## 2020-08-22 PROBLEM — E21.3 HYPERPARATHYROIDISM, UNSPECIFIED: Chronic | Status: ACTIVE | Noted: 2020-08-13

## 2020-08-22 PROBLEM — I63.9 CEREBRAL INFARCTION, UNSPECIFIED: Chronic | Status: ACTIVE | Noted: 2020-08-13

## 2020-08-22 PROBLEM — D64.9 ANEMIA, UNSPECIFIED: Chronic | Status: ACTIVE | Noted: 2020-08-13

## 2020-08-22 PROBLEM — E11.9 TYPE 2 DIABETES MELLITUS WITHOUT COMPLICATIONS: Chronic | Status: ACTIVE | Noted: 2020-04-13

## 2020-08-22 PROBLEM — K92.2 GASTROINTESTINAL HEMORRHAGE, UNSPECIFIED: Chronic | Status: ACTIVE | Noted: 2020-08-13

## 2020-08-22 PROBLEM — G47.33 OBSTRUCTIVE SLEEP APNEA (ADULT) (PEDIATRIC): Chronic | Status: ACTIVE | Noted: 2020-04-13

## 2020-08-24 ENCOUNTER — FORM ENCOUNTER (OUTPATIENT)
Age: 50
End: 2020-08-24

## 2020-08-25 ENCOUNTER — OUTPATIENT (OUTPATIENT)
Dept: OUTPATIENT SERVICES | Facility: HOSPITAL | Age: 50
LOS: 1 days | Discharge: HOME | End: 2020-08-25
Payer: COMMERCIAL

## 2020-08-25 VITALS — HEIGHT: 69 IN | WEIGHT: 179.9 LBS

## 2020-08-25 DIAGNOSIS — I63.9 CEREBRAL INFARCTION, UNSPECIFIED: ICD-10-CM

## 2020-08-25 PROCEDURE — 93312 ECHO TRANSESOPHAGEAL: CPT | Mod: 26,59

## 2020-08-25 PROCEDURE — 33285 INSJ SUBQ CAR RHYTHM MNTR: CPT

## 2020-08-25 NOTE — PROGRESS NOTE ADULT - SUBJECTIVE AND OBJECTIVE BOX
PREOPERATIVE DAY OF PROCEDURE EVALUATION:  I have personally seen and examined the patient.  I agree with the history and physical which I have reviewed and noted any changes below.    (Signed electronically by ___Kirk_______)  08-25-20 @ 10:24

## 2020-08-25 NOTE — PROGRESS NOTE ADULT - SUBJECTIVE AND OBJECTIVE BOX
Electrophysiology Brief Post-Op Note    I have personally seen and examined the patient.  I agree with the history and physical which I have reviewed and noted any changes below.  08-25-20 @ 09:38    PRE-OP DIAGNOSIS: AF    POST-OP DIAGNOSIS: AF    PROCEDURE: Loop Implnat    Physician: Iveth  Assistant: None    ESTIMATED BLOOD LOSS:  1     mL    ANESTHESIA TYPE:  [  ]General Anesthesia  [  ] Sedation  [X  ] Local/Regional    CONDITION  [  ] Critical  [  ] Serious  [  ]Fair  [ X ]Good      SPECIMENS REMOVED (IF APPLICABLE):  none    IMPLANTS (IF APPLICABLE)  Loop    FINDINGS  PLAN OF CARE  - FU 3-4 weeks

## 2020-08-25 NOTE — CHART NOTE - NSCHARTNOTEFT_GEN_A_CORE
Cardiac Electrophysiology Procedure Note    Procedure:  [employer]  Implantable Loop Recorder Implant    Indication: AF  Attending:	Rashel Lazaro MD    EQUIPMENT IMPLANTED      Medtronic Linq  Serial Number  [id number]        DESCRIPTION OF PROCEDURE  The patient was brought to the Procedure Room in a nonsedated and fasting state, having received preoperative antibiotics. Informed, written consent was obtained prior to the procedure.  The left shoulder region was cleaned and prepped with serial applications of Chlorhexidine. Patient was then covered with sterile drapes in the usual manner. Blood pressure, oxygenation and level of comfort were stable throughout.   	The left parasternal region was defined; 10 cc of lidocaine solution were infiltrated into the skin overlying the left deltopectoral groove. A 5 mm. incision was then made. The loop recorder was injected under the skin..     The wound margins approximated well, without any tension or overlap. The wound was closed using dermabond  A dry, sterile dressing was placed over this.     COMPLICATIONS:  The patient tolerated the procedure well. There were no immediate complications.    CONCLUSIONS:  Successful implant of loop recorder.

## 2020-08-25 NOTE — ASU PATIENT PROFILE, ADULT - PMH
Anemia  EDIS  Chronic kidney disease (CKD), stage V    DM (diabetes mellitus)  RECENT HGB AIC 5.6 AT [facility]  GI bleed  5/2020  Hyperparathyroidism    Ischemic cerebrovascular accident (CVA)  4/2020  RAE (obstructive sleep apnea)  NO CPAP  Proteinuria

## 2020-08-25 NOTE — CHART NOTE - NSCHARTNOTEFT_GEN_A_CORE
INDY Post Procedure Note:    After risks, benefits and alternatives of the procedure were explained, consent was signed and placed in the medical record.  Procedural timeout was taken.  Sedation was administered by anesthesia.  INDY probe inserted without complication and INDY performed.  Patient tolerated the procedure well without complication.  Post-procedure vital signs were stable.      INDY findings:  LVEF- normal  mild MR  No SUNITA thrombus. Normal SUNITA velocities  normal TV, PV and AV   No evidence of intracardiac source of embolism   Full report to follow INDY Post Procedure Note:    After risks, benefits and alternatives of the procedure were explained, consent was signed and placed in the medical record.  Procedural timeout was taken.  Sedation was administered by anesthesia.  INDY probe inserted without complication and INDY performed.  Patient tolerated the procedure well without complication.  Post-procedure vital signs were stable.      INDY findings:  LVEF- normal  mild MR  No SUNITA thrombus. Normal SUNITA velocities  normal TV, PV and AV   No evidence of intracardiac source of embolism     Specimen removed  NA  Estimated blood loss MA    Full report to follow

## 2020-09-30 ENCOUNTER — APPOINTMENT (OUTPATIENT)
Dept: CARDIOLOGY | Facility: CLINIC | Age: 50
End: 2020-09-30
Payer: COMMERCIAL

## 2020-09-30 PROCEDURE — G2066: CPT

## 2020-09-30 PROCEDURE — 93298 REM INTERROG DEV EVAL SCRMS: CPT

## 2020-11-20 ENCOUNTER — APPOINTMENT (OUTPATIENT)
Dept: CARDIOLOGY | Facility: CLINIC | Age: 50
End: 2020-11-20
Payer: COMMERCIAL

## 2020-11-20 PROCEDURE — 93298 REM INTERROG DEV EVAL SCRMS: CPT

## 2020-11-20 PROCEDURE — G2066: CPT

## 2020-12-15 ENCOUNTER — APPOINTMENT (OUTPATIENT)
Dept: CARDIOLOGY | Facility: CLINIC | Age: 50
End: 2020-12-15
Payer: COMMERCIAL

## 2020-12-15 VITALS
HEART RATE: 74 BPM | BODY MASS INDEX: 25.92 KG/M2 | TEMPERATURE: 96.6 F | SYSTOLIC BLOOD PRESSURE: 147 MMHG | HEIGHT: 69 IN | WEIGHT: 175 LBS | DIASTOLIC BLOOD PRESSURE: 87 MMHG

## 2020-12-15 PROCEDURE — 99072 ADDL SUPL MATRL&STAF TM PHE: CPT

## 2020-12-15 PROCEDURE — 99213 OFFICE O/P EST LOW 20 MIN: CPT

## 2020-12-15 RX ORDER — NIFEDIPINE 60 MG/1
60 TABLET, FILM COATED, EXTENDED RELEASE ORAL DAILY
Qty: 30 | Refills: 0 | Status: COMPLETED | COMMUNITY
Start: 2020-05-14 | End: 2020-12-15

## 2020-12-15 RX ORDER — CLOPIDOGREL BISULFATE 75 MG/1
75 TABLET, FILM COATED ORAL
Qty: 90 | Refills: 3 | Status: COMPLETED | COMMUNITY
Start: 2020-05-14 | End: 2020-12-15

## 2020-12-15 RX ORDER — ASPIRIN 81 MG/1
81 TABLET, CHEWABLE ORAL
Qty: 60 | Refills: 3 | Status: COMPLETED | COMMUNITY
Start: 2020-05-14 | End: 2020-12-15

## 2020-12-15 RX ORDER — ASPIRIN/ACETAMINOPHEN/CAFFEINE 250-250-65
325 (65 FE) TABLET ORAL DAILY
Refills: 0 | Status: ACTIVE | COMMUNITY

## 2020-12-15 RX ORDER — PATIROMER 16.8 G/1
16.8 POWDER, FOR SUSPENSION ORAL
Refills: 0 | Status: ACTIVE | COMMUNITY

## 2020-12-15 RX ORDER — MULTIVIT-MIN/FOLIC/VIT K/LYCOP 400-300MCG
1000 TABLET ORAL DAILY
Refills: 0 | Status: ACTIVE | COMMUNITY

## 2020-12-15 RX ORDER — ATORVASTATIN CALCIUM 80 MG/1
80 TABLET, FILM COATED ORAL
Qty: 30 | Refills: 3 | Status: COMPLETED | COMMUNITY
Start: 2020-05-14 | End: 2020-12-15

## 2020-12-15 RX ORDER — CHOLECALCIFEROL (VITAMIN D3) 50 MCG
2000 CAPSULE ORAL DAILY
Refills: 0 | Status: ACTIVE | COMMUNITY

## 2020-12-15 RX ORDER — CALCITRIOL 0.25 UG/1
0.25 CAPSULE, LIQUID FILLED ORAL
Refills: 0 | Status: COMPLETED | COMMUNITY
End: 2020-12-15

## 2020-12-15 RX ORDER — PNV NO.95/FERROUS FUM/FOLIC AC 28MG-0.8MG
TABLET ORAL DAILY
Refills: 0 | Status: ACTIVE | COMMUNITY

## 2020-12-15 RX ORDER — PANTOPRAZOLE 40 MG/1
40 TABLET, DELAYED RELEASE ORAL
Refills: 0 | Status: COMPLETED | COMMUNITY
End: 2020-12-15

## 2020-12-15 NOTE — PROCEDURE
[See Scanned Paceart Report] : See scanned paceart report [See Device Printout] : See device printout [de-identified] : loop [de-identified] : no events

## 2020-12-15 NOTE — HISTORY OF PRESENT ILLNESS
[FreeTextEntry1] : 49 y/o female with phm oh DM, hyperlipidemia, HTN,CKD, RAE ,  admitted to [facility] [place] in April 2020 for left sided weakness and was found to have b/l L>R strokes suggestive of embolic etiology. \par He reports hx of being told by his cardiologist 10 years ago, that he has  "flutter" and "not to worry about it" , however no prior records of previous ECGs available. \par In hospital hypercoagulable work-up was negative. \par Referred for long term arrhythmia monitoring to r/o Afib. \par \par He presents with his wife . \par Denies CP, shortness of breath or palpitations.  Reports occasional dizziness, still has coordination issues with his left hand . Denies YORK . Reports recent drop in Hgb, seen by GI , pending GI work-up \par \par ECG ( 7/30/2020): NSR at 82 bpm, normal WA, QRS, QTc\par \par In hospital ECHO( 4/2020) \par Summary:\par  1. LV Ejection Fraction by Ortiz's Method with a biplane EF of 57 %.\par  2. Moderately increased LV wall thickness.\par  3. Spectral Doppler shows impaired relaxation pattern of left ventricular myocardial filling (Grade I diastolic dysfunction).\par  4. Mild mitral valve regurgitation.\par  5. Color flow doppler and intravenous injection of agitated saline demonstrates the presence of an intact intra atrial septum.\par \par

## 2021-01-15 ENCOUNTER — APPOINTMENT (OUTPATIENT)
Dept: CARDIOLOGY | Facility: CLINIC | Age: 51
End: 2021-01-15
Payer: COMMERCIAL

## 2021-01-15 PROCEDURE — G2066: CPT

## 2021-01-15 PROCEDURE — 93298 REM INTERROG DEV EVAL SCRMS: CPT

## 2021-02-18 ENCOUNTER — TRANSCRIPTION ENCOUNTER (OUTPATIENT)
Age: 51
End: 2021-02-18

## 2021-02-18 RX ORDER — ROSUVASTATIN CALCIUM 10 MG/1
10 TABLET, FILM COATED ORAL
Qty: 90 | Refills: 3 | Status: ACTIVE | COMMUNITY
Start: 2020-08-10 | End: 1900-01-01

## 2021-02-19 ENCOUNTER — APPOINTMENT (OUTPATIENT)
Dept: CARDIOLOGY | Facility: CLINIC | Age: 51
End: 2021-02-19
Payer: COMMERCIAL

## 2021-02-19 PROCEDURE — 93298 REM INTERROG DEV EVAL SCRMS: CPT

## 2021-02-19 PROCEDURE — G2066: CPT

## 2021-02-27 ENCOUNTER — OUTPATIENT (OUTPATIENT)
Dept: OUTPATIENT SERVICES | Facility: HOSPITAL | Age: 51
LOS: 1 days | Discharge: HOME | End: 2021-02-27

## 2021-02-27 DIAGNOSIS — Z11.59 ENCOUNTER FOR SCREENING FOR OTHER VIRAL DISEASES: ICD-10-CM

## 2021-03-02 ENCOUNTER — TRANSCRIPTION ENCOUNTER (OUTPATIENT)
Age: 51
End: 2021-03-02

## 2021-03-02 ENCOUNTER — OUTPATIENT (OUTPATIENT)
Dept: OUTPATIENT SERVICES | Facility: HOSPITAL | Age: 51
LOS: 1 days | Discharge: HOME | End: 2021-03-02

## 2021-03-02 VITALS
HEART RATE: 77 BPM | SYSTOLIC BLOOD PRESSURE: 149 MMHG | OXYGEN SATURATION: 100 % | DIASTOLIC BLOOD PRESSURE: 88 MMHG | RESPIRATION RATE: 18 BRPM

## 2021-03-02 VITALS
WEIGHT: 175.05 LBS | TEMPERATURE: 98 F | RESPIRATION RATE: 18 BRPM | DIASTOLIC BLOOD PRESSURE: 85 MMHG | SYSTOLIC BLOOD PRESSURE: 164 MMHG | HEART RATE: 88 BPM | HEIGHT: 69 IN

## 2021-03-02 RX ORDER — CHOLECALCIFEROL (VITAMIN D3) 125 MCG
1 CAPSULE ORAL
Qty: 0 | Refills: 0 | DISCHARGE

## 2021-03-02 RX ORDER — ASCORBIC ACID 60 MG
1 TABLET,CHEWABLE ORAL
Qty: 0 | Refills: 0 | DISCHARGE

## 2021-03-02 RX ORDER — PREGABALIN 225 MG/1
2500 CAPSULE ORAL
Qty: 0 | Refills: 0 | DISCHARGE

## 2021-03-02 RX ORDER — IRON,CARBONYL 45 MG
1 TABLET ORAL
Qty: 0 | Refills: 0 | DISCHARGE

## 2021-03-02 RX ORDER — NIFEDIPINE 30 MG
1 TABLET, EXTENDED RELEASE 24 HR ORAL
Qty: 0 | Refills: 0 | DISCHARGE

## 2021-03-02 RX ORDER — ROSUVASTATIN CALCIUM 5 MG/1
1 TABLET ORAL
Qty: 0 | Refills: 0 | DISCHARGE

## 2021-03-02 RX ORDER — PATIROMER 8.4 G/1
0 POWDER, FOR SUSPENSION ORAL
Qty: 0 | Refills: 0 | DISCHARGE

## 2021-03-02 NOTE — ASU PATIENT PROFILE, ADULT - PMH
Anemia  EDIS  Chronic kidney disease (CKD), stage V    DM (diabetes mellitus)  RECENT HGB AIC 5.6 AT [facility]  GI bleed  5/2020  Hyperparathyroidism    Ischemic cerebrovascular accident (CVA)  4/2020  RAE (obstructive sleep apnea)  NO CPAP  Proteinuria

## 2021-03-02 NOTE — ASU DISCHARGE PLAN (ADULT/PEDIATRIC) - B. DRINK ALCOHOL, BEER, OR WINE
Assessment and Plan:    1. Depression, unspecified depression type  2. Depression with anxiety  Improved since starting wellbutrin, will continue.  - buPROPion (WELLBUTRIN) 75 MG tablet; Take 1 tablet (75 mg total) by mouth once daily.  Dispense: 90 tablet; Refill: 1    ______________________________________________________________________  Subjective:    Chief Complaint:  Follow up chronic medical conditions.     HPI:  Katlyn is a 44 y.o. year old female here to follow up chronic medical conditions.     Started wellbutrin last visit 75 mg daily. Notes that this has been helping. No significant side effects. Overall is happy with this and would like to continue this medication for now.     Has history of hypothyroidism, but not currently on medications and last lab was normal.     Since I had last seen her she has established care with Dr. Hammonds and is planning lap band removal.     Medications:  Current Outpatient Medications on File Prior to Visit   Medication Sig Dispense Refill    BEPREVE 1.5 % Drop PLACE 1 DROP INTO OU BID FOR 1 WEEK  1    buPROPion (WELLBUTRIN) 75 MG tablet Take 1 tablet (75 mg total) by mouth once daily. 30 tablet 1    esomeprazole (NEXIUM) 20 MG capsule Take 20 mg by mouth before breakfast.      hydrOXYzine HCl (ATARAX) 25 MG tablet Take 1 tablet (25 mg total) by mouth 3 (three) times daily. (Patient taking differently: Take 25 mg by mouth 3 (three) times daily as needed. ) 30 tablet 1    norgestimate-ethinyl estradiol (ORTHO-CYCLEN) 0.25-35 mg-mcg per tablet Take 1 tablet by mouth once daily. 30 tablet 11    triamcinolone acetonide 0.1% (KENALOG) 0.1 % cream Apply topically 2 (two) times daily. 45 g 2     No current facility-administered medications on file prior to visit.        Review of Systems:  Review of Systems   Constitutional: Negative for activity change and unexpected weight change.   HENT: Negative for hearing loss, rhinorrhea and trouble swallowing.    Eyes: Positive  "for discharge. Negative for visual disturbance.   Respiratory: Negative for chest tightness and wheezing.    Cardiovascular: Negative for chest pain and palpitations.   Gastrointestinal: Negative for blood in stool, constipation, diarrhea and vomiting.   Endocrine: Negative for polydipsia and polyuria.   Genitourinary: Negative for difficulty urinating, hematuria and menstrual problem.   Musculoskeletal: Negative for arthralgias, joint swelling and neck pain.   Neurological: Negative for weakness and headaches.   Psychiatric/Behavioral: Positive for dysphoric mood (improving). Negative for confusion.     Entered by patient and reviewed and updated during visit      Past Medical History:  Past Medical History:   Diagnosis Date    Abnormal Pap smear of cervix     about 10 years ago, per patient    Cholelithiasis     Depression     Esophageal reflux     Oral contraceptive use     Thyroid disease     hypothyroid       Objective:    Vitals:  Vitals:    01/31/20 1557   BP: 116/82   Pulse: 67   Resp: 18   SpO2: 99%   Weight: 87.7 kg (193 lb 5.5 oz)   Height: 5' 3" (1.6 m)   PainSc: 0-No pain       Physical Exam   Constitutional: She is oriented to person, place, and time. She appears well-developed and well-nourished. No distress.   HENT:   Mouth/Throat: Oropharynx is clear and moist.   Eyes: Conjunctivae are normal. Right eye exhibits no discharge. Left eye exhibits no discharge.   Cardiovascular: Normal rate and regular rhythm.   Pulmonary/Chest: Effort normal. No respiratory distress.   Neurological: She is alert and oriented to person, place, and time.   Skin: Skin is warm and dry.   Psychiatric: She has a normal mood and affect. Her behavior is normal. Judgment and thought content normal.   Vitals reviewed.      Data:  Previous labs reviewed and pertinent for TSH WNL.      Callie Jasmine MD  Internal Medicine  " Statement Selected

## 2021-03-02 NOTE — CHART NOTE - NSCHARTNOTEFT_GEN_A_CORE
PACU ANESTHESIA ADMISSION NOTE      Procedure:   Post op diagnosis:      ____  Intubated  TV:______       Rate: ______      FiO2: ______    __x__  Patent Airway    __x__  Full return of protective reflexes    __x__  Full recovery from anesthesia / back to baseline     Vitals:   T:    98.2       R:    16              BP:   127/86               Sat:    96               P: 92      Mental Status:  __x__ Awake   __x___ Alert   _____ Drowsy   _____ Sedated    Nausea/Vomiting:  __x__ NO  ______Yes,   See Post - Op Orders          Pain Scale (0-10):  __0___    Treatment: ____ None    ___x_ See Post - Op/PCA Orders    Post - Operative Fluids:   ____ Oral   __x__ See Post - Op Orders    Plan: Discharge:   __x__Home       _____Floor     _____Critical Care    _____  Other:_________________    Comments:  pt tolerated procedure well, pt transferred to PACU and report was given to PACU RN, vital signs are stable and pt shows no signs of distress. no anesthesia complications, discharge pt when criteria met.

## 2021-03-05 ENCOUNTER — NON-APPOINTMENT (OUTPATIENT)
Age: 51
End: 2021-03-05

## 2021-03-05 DIAGNOSIS — N18.5 CHRONIC KIDNEY DISEASE, STAGE 5: ICD-10-CM

## 2021-03-05 DIAGNOSIS — Z12.11 ENCOUNTER FOR SCREENING FOR MALIGNANT NEOPLASM OF COLON: ICD-10-CM

## 2021-03-05 DIAGNOSIS — Z86.73 PERSONAL HISTORY OF TRANSIENT ISCHEMIC ATTACK (TIA), AND CEREBRAL INFARCTION WITHOUT RESIDUAL DEFICITS: ICD-10-CM

## 2021-03-05 DIAGNOSIS — G47.33 OBSTRUCTIVE SLEEP APNEA (ADULT) (PEDIATRIC): ICD-10-CM

## 2021-03-05 DIAGNOSIS — E11.22 TYPE 2 DIABETES MELLITUS WITH DIABETIC CHRONIC KIDNEY DISEASE: ICD-10-CM

## 2021-03-05 DIAGNOSIS — D63.1 ANEMIA IN CHRONIC KIDNEY DISEASE: ICD-10-CM

## 2021-03-05 DIAGNOSIS — K64.8 OTHER HEMORRHOIDS: ICD-10-CM

## 2021-03-05 DIAGNOSIS — Z88.8 ALLERGY STATUS TO OTHER DRUGS, MEDICAMENTS AND BIOLOGICAL SUBSTANCES STATUS: ICD-10-CM

## 2021-03-26 ENCOUNTER — APPOINTMENT (OUTPATIENT)
Dept: CARDIOLOGY | Facility: CLINIC | Age: 51
End: 2021-03-26
Payer: COMMERCIAL

## 2021-03-26 ENCOUNTER — NON-APPOINTMENT (OUTPATIENT)
Age: 51
End: 2021-03-26

## 2021-03-26 PROCEDURE — 93298 REM INTERROG DEV EVAL SCRMS: CPT

## 2021-03-26 PROCEDURE — G2066: CPT

## 2021-04-20 ENCOUNTER — APPOINTMENT (OUTPATIENT)
Dept: CARDIOLOGY | Facility: CLINIC | Age: 51
End: 2021-04-20
Payer: COMMERCIAL

## 2021-04-20 VITALS — TEMPERATURE: 98.1 F

## 2021-04-20 DIAGNOSIS — I63.9 CEREBRAL INFARCTION, UNSPECIFIED: ICD-10-CM

## 2021-04-20 DIAGNOSIS — N18.9 CHRONIC KIDNEY DISEASE, UNSPECIFIED: ICD-10-CM

## 2021-04-20 DIAGNOSIS — Z45.09 ENCOUNTER FOR ADJUSTMENT AND MANAGEMENT OF OTHER CARDIAC DEVICE: ICD-10-CM

## 2021-04-20 PROCEDURE — 99213 OFFICE O/P EST LOW 20 MIN: CPT

## 2021-04-20 PROCEDURE — 93291 INTERROG DEV EVAL SCRMS IP: CPT

## 2021-04-20 PROCEDURE — 99072 ADDL SUPL MATRL&STAF TM PHE: CPT

## 2021-04-20 RX ORDER — NUTRITIONAL SUPPLEMENT
LIQUID (ML) ORAL
Refills: 0 | Status: COMPLETED | COMMUNITY
End: 2021-04-20

## 2021-04-20 RX ORDER — NIFEDIPINE 30 MG/1
30 TABLET, EXTENDED RELEASE ORAL DAILY
Refills: 0 | Status: COMPLETED | COMMUNITY
End: 2021-04-20

## 2021-04-20 RX ORDER — NIFEDIPINE 60 MG/1
60 TABLET, EXTENDED RELEASE ORAL DAILY
Refills: 0 | Status: ACTIVE | COMMUNITY

## 2021-04-20 NOTE — PHYSICAL EXAM
[General Appearance - Well Developed] : well developed [Normal Appearance] : normal appearance [General Appearance - Well Nourished] : well nourished [Well Groomed] : well groomed [No Deformities] : no deformities [General Appearance - In No Acute Distress] : no acute distress [Heart Rate And Rhythm] : heart rate and rhythm were normal [Murmurs] : no murmurs present [Heart Sounds] : normal S1 and S2 [Respiration, Rhythm And Depth] : normal respiratory rhythm and effort [Exaggerated Use Of Accessory Muscles For Inspiration] : no accessory muscle use [Auscultation Breath Sounds / Voice Sounds] : lungs were clear to auscultation bilaterally [Clean] : clean [Dry] : dry [Well-Healed] : well-healed [Abdomen Soft] : soft [Abdomen Tenderness] : non-tender [Abdomen Mass (___ Cm)] : no abdominal mass palpated [Nail Clubbing] : no clubbing of the fingernails [Cyanosis, Localized] : no localized cyanosis [Petechial Hemorrhages (___cm)] : no petechial hemorrhages [] : no ischemic changes

## 2021-04-20 NOTE — ASSESSMENT
[FreeTextEntry1] : 49 y/o male with pmh of DM, HTN, hyperlipidemia, left hydronephrosis, CKD on transplant list at [facility], CVA 4/2020, s/p ILR 8/2020\par s/p INDY 8/25/2020: no PFO, no thrombus , mild MR  \par \par He reports c/w meds , currently on Plavix 75 mg daily.  \par Loop recorder interrogation: Normal function. No events. \par He is enrolled in remote monitoring and transmitting appropriately.  \par \par Patient is requesting to have the loop recorder explanted. Discussed the benefits of long term arrhythmia monitoring to r/o subclinical Afib as a cause of his stroke. He verbalized full understanding and agreed to keep the loop recorder for now. \par  \par \par Plan\par -Continue current medications. \par -Continue remote monitoring. \par -F/U with the stroke clinic as scheduled \par -RTC in 6 months \par  \par \par \par \par \par \par \par \par \par

## 2021-04-20 NOTE — HISTORY OF PRESENT ILLNESS
[de-identified] : 51 y/o male with an ILR for hx of stroke, returning for follow up. \par \par He denies chest pain, SOB or palpitations. Denies dizziness, near syncope or syncope. Denies recurrent TIA/CVA symptoms since the loop was implanted. \par \par

## 2021-04-20 NOTE — PROCEDURE
[See Scanned Paceart Report] : See scanned paceart report [See Device Printout] : See device printout [No] : not [de-identified] : loop [de-identified] : no events

## 2021-05-24 ENCOUNTER — NON-APPOINTMENT (OUTPATIENT)
Age: 51
End: 2021-05-24

## 2021-05-24 ENCOUNTER — APPOINTMENT (OUTPATIENT)
Dept: CARDIOLOGY | Facility: CLINIC | Age: 51
End: 2021-05-24
Payer: COMMERCIAL

## 2021-05-24 PROCEDURE — 93298 REM INTERROG DEV EVAL SCRMS: CPT

## 2021-05-24 PROCEDURE — G2066: CPT

## 2021-06-15 NOTE — OCCUPATIONAL THERAPY INITIAL EVALUATION ADULT - PREDICTED DURATION OF THERAPY (DAYS/WKS), OT EVAL
Patient informed, she scheduled lab appointment.  
Please inform patient that I have filled her hydrochlorothiazide and irbesartan and enough until she is seen again in the office on 8/2/2021.  If she does not keep that appointment she will not have any further refills.  I would also recommend she get fasting lab test done prior to that appointment so all of that can be discussed at her visit  
1 wk

## 2021-06-28 ENCOUNTER — NON-APPOINTMENT (OUTPATIENT)
Age: 51
End: 2021-06-28

## 2021-06-28 ENCOUNTER — APPOINTMENT (OUTPATIENT)
Dept: CARDIOLOGY | Facility: CLINIC | Age: 51
End: 2021-06-28
Payer: COMMERCIAL

## 2021-06-28 PROCEDURE — 93298 REM INTERROG DEV EVAL SCRMS: CPT

## 2021-06-28 PROCEDURE — G2066: CPT

## 2021-08-02 ENCOUNTER — NON-APPOINTMENT (OUTPATIENT)
Age: 51
End: 2021-08-02

## 2021-08-02 ENCOUNTER — APPOINTMENT (OUTPATIENT)
Dept: CARDIOLOGY | Facility: CLINIC | Age: 51
End: 2021-08-02
Payer: COMMERCIAL

## 2021-08-02 PROCEDURE — G2066: CPT

## 2021-08-02 PROCEDURE — 93298 REM INTERROG DEV EVAL SCRMS: CPT

## 2021-09-07 ENCOUNTER — APPOINTMENT (OUTPATIENT)
Dept: CARDIOLOGY | Facility: CLINIC | Age: 51
End: 2021-09-07
Payer: COMMERCIAL

## 2021-09-07 ENCOUNTER — NON-APPOINTMENT (OUTPATIENT)
Age: 51
End: 2021-09-07

## 2021-09-07 PROCEDURE — G2066: CPT

## 2021-09-07 PROCEDURE — 93298 REM INTERROG DEV EVAL SCRMS: CPT

## 2021-09-17 ENCOUNTER — APPOINTMENT (OUTPATIENT)
Dept: CARDIOLOGY | Facility: CLINIC | Age: 51
End: 2021-09-17

## 2021-10-11 ENCOUNTER — APPOINTMENT (OUTPATIENT)
Dept: CARDIOLOGY | Facility: CLINIC | Age: 51
End: 2021-10-11
Payer: COMMERCIAL

## 2021-10-11 ENCOUNTER — NON-APPOINTMENT (OUTPATIENT)
Age: 51
End: 2021-10-11

## 2021-10-11 PROCEDURE — G2066: CPT | Mod: NC

## 2021-10-11 PROCEDURE — 93298 REM INTERROG DEV EVAL SCRMS: CPT

## 2021-11-12 ENCOUNTER — NON-APPOINTMENT (OUTPATIENT)
Age: 51
End: 2021-11-12

## 2021-11-12 ENCOUNTER — APPOINTMENT (OUTPATIENT)
Dept: CARDIOLOGY | Facility: CLINIC | Age: 51
End: 2021-11-12
Payer: COMMERCIAL

## 2021-11-12 PROCEDURE — G2066: CPT | Mod: NC

## 2021-11-12 PROCEDURE — 93298 REM INTERROG DEV EVAL SCRMS: CPT

## 2021-12-17 ENCOUNTER — NON-APPOINTMENT (OUTPATIENT)
Age: 51
End: 2021-12-17

## 2021-12-17 ENCOUNTER — APPOINTMENT (OUTPATIENT)
Dept: CARDIOLOGY | Facility: CLINIC | Age: 51
End: 2021-12-17
Payer: COMMERCIAL

## 2021-12-17 PROCEDURE — G2066: CPT

## 2021-12-17 PROCEDURE — 93298 REM INTERROG DEV EVAL SCRMS: CPT

## 2021-12-30 ENCOUNTER — RX RENEWAL (OUTPATIENT)
Age: 51
End: 2021-12-30

## 2021-12-30 RX ORDER — CLOPIDOGREL BISULFATE 75 MG/1
75 TABLET, FILM COATED ORAL DAILY
Qty: 90 | Refills: 1 | Status: ACTIVE | COMMUNITY
Start: 2021-12-30 | End: 1900-01-01

## 2022-08-11 NOTE — ED ADULT NURSE NOTE - ALCOHOL PRE SCREEN (AUDIT - C)
Lauren,  It was very nice to see you today! Below is a running list of where we will start off with our 6 month program (some will come into play at later visits). Feel free to reach out if you have any questions or concerns.     Pre Procedure Checklist:   [  ]  Watch online [employer] educational modules sent email address.   [  ]  Obtain evaluation by neuropsychologist, referred 8/11   [  ]  Supervised weight loss with registered dietitian, first visit in August    [ x ]  Colonoscopy UTD, 2019, follow up 10 yrs   [ x ]  Mammo/pap/annual   [  ]  Evaluation and exercise training with physical therapy (3 visits), referred 8/11   [  ]  CPAP Compliance report    [  ]  pre-op physical exam   [  ]  Weight loss goal: 210 lbs.   [  ]  Neuro,endocrine, metabolic labs      Jodi Schaffer PA-C    
Statement Selected

## 2023-01-01 NOTE — PROGRESS NOTE ADULT - PROVIDER SPECIALTY LIST ADULT
Internal Medicine [place] Discharge Note    Gender: female BW: 6 lb 13 oz (3090 g)   Age: 41 hours OB:    Gestational Age at Birth: Gestational Age: 39w1d Pediatrician:       Subjective   Maternal Information:     Mother's Name: Geri Judge    Age: 18 y.o.       Outside Maternal Prenatal Labs -- transcribed from office records:   External Prenatal Results     Pregnancy Outside Results - Transcribed From Office Records - See Scanned Records For Details     Test Value Date Time    ABO  A  01/10/23 1417    Rh  Positive  01/10/23 1417    Antibody Screen  Negative  01/10/23 1416       Negative  22 1526    Varicella IgG  461 index 22 1526    Rubella  15.40 index 22 1526    Hgb  9.6 g/dL 23 0516       11.6 g/dL 01/10/23 1416       12.2 g/dL 22 1121       11.4 g/dL 10/24/22 0919       10.2 g/dL 22 1610       10.9 g/dL 22 1249       12.6 g/dL 22 1526       12.2 g/dL 22 1344       13.1 g/dL 22 2305    Hct  28.8 % 23 0516       35.3 % 01/10/23 1416       35.7 % 22 1121       34.9 % 10/24/22 0919       30.4 % 22 1610       32.3 % 22 1249       37.5 % 22 1526       36.3 % 22 1344       38.7 % 22 2305    Glucose Fasting GTT  81 mg/dL 10/24/22 0919    Glucose Tolerance Test 1 hour  136 mg/dL 10/24/22 0919    Glucose Tolerance Test 3 hour       Gonorrhea (discrete)  Negative  22 1350    Chlamydia (discrete)  Negative  22 1350    RPR  Non Reactive  22 1526    VDRL       Syphilis Antibody       HBsAg  Negative  22 1526    Herpes Simplex Virus PCR       Herpes Simplex VIrus Culture       HIV  Non Reactive  22 1526    Hep C RNA Quant PCR       Hep C Antibody  0.1 s/co ratio 22 1526    AFP       Group B Strep  Negative  22 1115    GBS Susceptibility to Clindamycin       GBS Susceptibility to Erythromycin       Fetal Fibronectin       Genetic Testing, Maternal Blood             Drug Screening     Test Value  Date Time    Urine Drug Screen       Amphetamine Screen  Negative  01/10/23 1416       Negative  23 1328       Negative ng/mL 22 1349       Negative  22 2309    Barbiturate Screen  Negative  01/10/23 1416       Negative  23 1328       Negative ng/mL 22 1349       Negative  22 2309    Benzodiazepine Screen  Negative  01/10/23 1416       Negative  23 1328       Negative ng/mL 22 1349       Negative  22 2309    Methadone Screen  Negative  01/10/23 1416       Negative  23 1328       Negative ng/mL 22 1349       Negative  22 2309    Phencyclidine Screen  Negative  01/10/23 1416       Negative  23 1328       Negative ng/mL 22 1349       Negative  22 2309    Opiates Screen  Negative  01/10/23 1416       Negative  23 1328       Negative  22 2309    THC Screen  Negative  01/10/23 1416       Negative  23 1328       Negative  22 2309    Cocaine Screen       Propoxyphene Screen  Negative  01/10/23 1416       Negative  23 1328       Negative ng/mL 22 1349       Negative  22 2309    Buprenorphine Screen  Negative  01/10/23 1416       Negative  23 1328       Negative  22 2309    Methamphetamine Screen       Oxycodone Screen  Negative  01/10/23 1416       Negative  23 1328       Negative  22 2309    Tricyclic Antidepressants Screen  Negative  01/10/23 1416       Negative  23 1328       Negative  22 2309          Legend    ^: Historical                           Patient Active Problem List   Diagnosis   • Nausea and vomiting in pregnancy   • Mild intermittent asthma without complication   • Anemia, unspecified   •  (normal spontaneous vaginal delivery)         Mother's Past Medical and Social History:      Maternal /Para:    Maternal PMH:    Past Medical History:   Diagnosis Date   • Allergic    • Asthma    • Heart murmur     resolved      Maternal  Social History:    Social History     Socioeconomic History   • Marital status:    Tobacco Use   • Smoking status: Never   • Smokeless tobacco: Never   Vaping Use   • Vaping Use: Former   • Substances: Nicotine   • Devices: Disposable   Substance and Sexual Activity   • Alcohol use: Not Currently     Comment: occ   • Drug use: No   • Sexual activity: Defer        Mother's Current Medications   docusate sodium, 100 mg, Oral, BID  ferrous sulfate, 324 mg, Oral, Daily With Breakfast  lactated ringers, 1,000 mL, Intravenous, Once  oxytocin, 999 mL/hr, Intravenous, Once  prenatal vitamin, 1 tablet, Oral, Daily       Labor Information:      Labor Events      labor: No Induction:  Balloon Dilation;Misoprostol    Steroids?  None Reason for Induction:  Elective   Rupture date:  2023 Complications:    Labor complications:  Fetal Intolerance  Additional complications:     Rupture time:  11:14 AM    Rupture type:  artificial rupture of membranes    Fluid Color:  Meconium Present;Other (See Comments)    Antibiotics during Labor?       Misoprostol      Anesthesia     Method: Epidural     Analgesics:            YOB: 2022 Delivery Clinician:     Time of birth:  2:28 PM Delivery type:  Vaginal, Spontaneous   Forceps:     Vacuum:     Breech:      Presentation/position:          Observed Anomalies:   Delivery Complications:              APGAR SCORES             APGARS  One minute Five minutes Ten minutes Fifteen minutes Twenty minutes   Skin color: 1   1             Heart rate: 2   2             Grimace: 2   2              Muscle tone: 2   2              Breathin   2              Totals: 8   9                Resuscitation     Suction: bulb syringe   Catheter size:     Suction below cords:     Intensive:       Subjective    Objective     [place] Information     Vital Signs Temp:  [98.1 °F (36.7 °C)-98.8 °F (37.1 °C)] 98.8 °F (37.1 °C)  Heart Rate:  [140-150] 140  Resp:  [30-52] 30  BP:  "(60-74)/(31-39) 74/31   Admission Vital Signs: Vitals  Temp: 98.4 °F (36.9 °C)  Temp src: Axillary  Heart Rate: 160  Heart Rate Source: Apical  Resp: (!) 52  Resp Rate Source: Stethoscope  BP: 60/39  BP Location: Right arm  BP Method: Automatic  Patient Position: Lying   Birth Weight: 3090 g (6 lb 13 oz)   Birth Length: Head Circumference: 13.39\" (34 cm)   Birth Head circumference: Head Circumference  Head Circumference: 13.39\" (34 cm)   Current Weight: Weight: 2954 g (6 lb 8.2 oz)   Change in weight since birth: -4%     Physical Exam     Objective    General appearance Normal Term female   Skin  No rashes.  No jaundice   Head AFSF.  No caput. No cephalohematoma. No nuchal folds   Eyes  + RR bilaterally   Ears, Nose, Throat  Normal ears.  No ear pits. No ear tags.  Palate intact.   Thorax  Normal   Lungs BSBE - CTA. No distress.   Heart  Normal rate and rhythm.  No murmurs, no gallops. Peripheral pulses strong and equal in all 4 extremities.   Abdomen + BS.  Soft. NT. ND.  No mass/HSM   Genitalia  normal female exam   Anus Anus patent   Trunk and Spine Spine intact.  No sacral dimples.   Extremities  Clavicles intact.  No hip clicks/clunks.   Neuro + Wade, grasp, suck.  Normal Tone       Intake and Output     Feeding: breastfeed    Intake/Output  I/O last 3 completed shifts:  In: 64 [P.O.:64]  Out: -  UOP x 2 BM x 3  No intake/output data recorded.    Labs and Radiology     Prenatal labs:  reviewed    Baby's Blood type: No results found for: ABO, LABABO, RH, LABRH       Labs:   Recent Results (from the past 96 hour(s))   Bilirubin,  Panel    Collection Time: 23  9:39 PM    Specimen: Blood   Result Value Ref Range    Bilirubin, Direct 0.3 0.0 - 0.8 mg/dL    Bilirubin, Indirect 7.8 mg/dL    Total Bilirubin 8.1 (H) 0.0 - 8.0 mg/dL       TCI:        Xrays:  No orders to display         Assessment & Plan     Discharge planning     Congenital Heart Disease Screen:  Blood Pressure/O2 Saturation/Weights "   Vitals (last 7 days)     Date/Time BP BP Location SpO2 Weight    23 -- -- -- 2954 g (6 lb 8.2 oz)    23 1546 74/31 Right leg -- --    23 1545 60/39 Right arm -- --    23 0315 -- -- -- 3070 g (6 lb 12.3 oz)           [place] Testing  CCHD Critical Congen Heart Defect Test Date: 23 (23)  Critical Congen Heart Defect Test Result: pass (23)   Car Seat Challenge Test     Hearing Screen Hearing Screen, Left Ear: ABR (auditory brainstem response), passed (23)  Hearing Screen, Right Ear: ABR (auditory brainstem response), passed (23)  Hearing Screen, Right Ear: ABR (auditory brainstem response), passed (23)  Hearing Screen, Left Ear: ABR (auditory brainstem response), passed (23)     Screen       Immunization History   Administered Date(s) Administered   • Hep B, Adolescent or Pediatric 2023       Assessment and Plan     Assessment & Plan    TBLC  Bili 8.1 - recheck pending. Will await it to determine if needs to go home on bili lights. Either way, feeding well and should be good to d/c today. If bili increases more than expected, will f/u in office tomorrow.    Aura Ramires MD  2023  08:24 EST

## 2023-01-01 NOTE — HISTORY OF PRESENT ILLNESS
[Home] : at home, [unfilled] , at the time of the visit. [Other Location: e.g. 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  He is under the care of nephrologist dr Qureshi.  Renal scan shows 9 percent function of left kidney and 91 percent right kidney.  Some sluggish clearing of radionucleotide lkely du to his azotemia.  crea improved to 4.5 since last visit.  not on dialysis.  No change in urination [Urinary Retention] : no urinary retention [Urinary Frequency] : no urinary frequency [Urinary Urgency] : no urinary urgency [Dysuria] : no dysuria Immunizations:    hepatitis B IntraMuscular Vaccine - Peds: ( @ 06:15)      Synagis:       Screenings:    Latest CCHD screen:  CCHD Screen []: Initial  Pre-Ductal SpO2(%): 97  Post-Ductal SpO2(%): 98  SpO2 Difference(Pre MINUS Post): -1  Extremities Used: Right Hand,Right Foot  Result: Passed  Follow up: Normal Screen- (No follow-up needed)        Latest car seat screen:      Latest hearing screen:  Right ear hearing screen completed date: 2023  Right ear screen method: EOAE (evoked otoacoustic emission)  Right ear screen result: Passed  Right ear screen comment: N/A    Left ear hearing screen completed date: 2023  Left ear screen method: EOAE (evoked otoacoustic emission)  Left ear screen result: Passed  Left ear screen comments: N/A       screen:  Screen#: [id number]  Screen Date: 2023  Screen Comment: N/A    Screen#: [id number]  Screen Date: 2023  Screen Comment: N/A     [Straining] : no straining [Hematuria - Gross] : no gross hematuria [Flank Pain] : no flank pain

## 2023-03-29 ENCOUNTER — FORM ENCOUNTER (OUTPATIENT)
Age: 53
End: 2023-03-29
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
## 2025-07-14 NOTE — PROGRESS NOTE ADULT - SUBJECTIVE AND OBJECTIVE BOX
Neurocritical Care Progress Note:    1. Brief Presentation: He reports of improved left sided weakness, continues to have dysarthria and left facial. His BP still elevated, but titrating down gradually.     2. Today's Acute Problems: Left sided weakness, left facial droop, and + dysarthria    3. 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 Of note, he also reports of body aches "as if I had the flu," + chills, abdominal discomfort without N/V. He denies chest pain, cough, HA or dysuria. In ED, he was found to have /128, initial NIHSS 4. Initial CT head negative for intracranial bleed and no evidence of stroke. Patient was started on nicardipine drip but it was subsequently d/rodger per neurology request due to worsening left sided weakness and for sudden drop in SBP to ~170s. He was also found to have elevated creatinine at 4 (no baseline).     4-Yesterday's Plan:  - Telemetry monitoring  -Only restart Cardene if BP >220/110 and sustained.   -MRI brain without carmelina   -Echo with bubble study  -LDL  -Maintain strict glycemic control  -PT OT Rehab  - mg daily.   -Lipitor 80 mg QHS  -PT OT Rehab Speech eval  -Neuro checks q4h    5. Last 24 hour updates: COVID19 ruled out and patient now under ICU service, and Brain MRI obtained and reports of acute lacunar infarct of right BG and CR and subacute infarcts in left CR. Neurologically stable.     6. Medications:   aspirin enteric coated 162 milliGRAM(s) Oral daily  atorvastatin 80 milliGRAM(s) Oral at bedtime  chlorhexidine 4% Liquid 1 Application(s) Topical <User Schedule>  heparin  Injectable 5000 Unit(s) SubCutaneous every 8 hours  labetalol 200 milliGRAM(s) Oral every 12 hours  niCARdipine Infusion 5 mG/Hr (25 mL/Hr) IV Continuous <Continuous>  NIFEdipine XL 30 milliGRAM(s) Oral daily  pantoprazole    Tablet 40 milliGRAM(s) Oral before breakfast  sodium chloride 0.9%. 1000 milliLiter(s) (100 mL/Hr) IV Continuous <Continuous>    7. Ancillary Management:   Chest PT[ ]   Head of bed >35 [ ]   Out of bed to chair [X ]   PT/OT/SP Eval [ X]   Spirometry[ ]   DVT prophalaxis[ ]    8.Neuro:   Mental status: Awake, alert and oriented x 4. Attention and concentration intact. Fund of knowledge appropriate.  Language: Following commands. Naming, repetition, fluency, and comprehension intact. + Dysarthria, no aphasia.  Cranial nerves: B/l pupils equally round and reactive to light to 4->3 mm, visual fields full, no nystagmus, extraocular muscles intact, V1 through V3 intact bilaterally and symmetric, left facial droop, hearing intact to finger rub, palate elevation symmetric, tongue was midline, sternocleidomastoid/shoulder shrug strength bilaterally 5/5.    Motor: Normal bulk and tone, strength 5/5 in bilateral upper and lower extremities.   strength 4/5 in left hand. No drift in all extremities.   Sensation: Intact to light touch, proprioception, and pinprick.  No neglect.   Coordination: No dysmetria on finger-to-nose and heel-to-shin.     NIH STROKE SCALE  Item	                                                        Score  1 a.	Level of Consciousness	               	0  1 b. LOC Questions	                                    0  1 c.	LOC Commands	                               	0  2.	Best Gaze	                                    0  3.	Visual	                                                0  4.	Facial Palsy	                                    2  5 a.	Motor Arm - Left	                        0  5 b.	Motor Arm - Right	                        0  6 a.	Motor Leg - Left	                        0  6 b.	Motor Leg - Right	                        0  7.	Limb Ataxia	                                    0  8.	Sensory	                                                0  9.	Language	                                    0  10.	Dysarthria	                                    1  11.	Extinction and Inattention  	            0  ________________________________________________________________________  TOTAL	                                                            4->3    mRS: baseline 0 ->1 No significant disability despite symptoms; able to carry out all usual duties and activities without assistance    Last CTH:  < from: CT Head No Cont (04.13.20 @ 13:14) >  IMPRESSION:   In comparison with the prior noncontrast CT scan of the brain dated April 13, 2020 time 10:27 AM:  Stable examination.  MRI of the brain is suggested for further evaluation not contraindicated in this patient.    < from: CT Brain Stroke Protocol (04.13.20 @ 10:35) >  IMPRESSION:  1.  No acute hemorrhage  2.  No acute territorial infarct  3.  Bilateral relatively symmetric diminished density within the white matter tracts of the corona radiata extending to external capsules. The appearance suggests ischemic change, but does not appear acute    Last CTA/CTP:  < from: CT Perfusion w/ Maps w/ IV Cont (04.13.20 @ 11:00) >  FINDINGS:  Aortic arch: There is a normal branching pattern. The origins of the great vessels are normal. The subclavian arteries are unremarkable.  Right carotid: The common carotid artery is normal. The bifurcation is normally patent. The cervical internal and external carotid arteries are normal.  Left carotid: The common carotid artery is unremarkable. The bifurcation is patent. The cervical internal and external carotid arteries are normal.    Anterior intracranial circulation:    Right ICA: The petrous, cavernous and supraclinoid portions of the ICA are normal. The ophthalmic artery is patent. There is a prominent posterior communicating supply to the posterior cerebral artery with hypoplastic P1. The M1, bifurcation and remaining branches of the middle cerebral artery are patent. The anterior cerebral arteryA1 segment is mildly hypoplastic.    Left ICA: The petrous, cavernous and supraclinoid portions of the ICA are patent. There may be mild narrowing of the para ophthalmic ICA. The ophthalmic artery and small posterior communicating arteries are patent. The middle cerebral artery, trifurcation and branches are patent. The anterior cerebral arteries patent.    The region of the anterior to indicating arteries normal.    Vertebral basilar vessels: The vertebral arteries are patent in the neck. The left vertebral artery is dominant. The right vertebral artery is small and terminates in a posterior inferior cerebellar artery. The left PICA is patent. The basilar artery, superior cerebellar and posterior cerebral arteries are patent.    The visualized brain parenchyma was evaluated on the routine head CT performed at same time.    Other findings: The submandibular and parotid glands are unremarkable. The thyroid gland is unremarkable.    The visualized upper aerodigestive tract is normal.    Therapid perfusion maps do not demonstrate evidence for brain ischemia using the Tmax > 6 sec and CBF < 30% mismatch volume criteria.    Images to the upper thorax demonstrates that the visualized pleural parenchyma and mediastinum are normal.    IMPRESSION:  1.  Patent carotid bifurcations.  2.  No large vessel occlusion  3.  No evidence for ischemia using rapid perfusion maps and the Tmax > 6 sec and CBF < 30% mismatch volume criteria.    Last MRI:   < from: MR Head No Cont (04.15.20 @ 09:36) >  FINDINGS:   There is restricted diffusion with corresponding T2/FLAIR hyperintense signal in the right basal ganglia extending to the right frontal corona radiata compatible with infarct.There are also additional foci of restricted diffusion in the right external capsule, in the right periatrial white matter, compatible with lacunar infarcts. There are foci of high signal in the left corona radiata on the B 1000 sequence with corresponding T2 and FLAIR hyperintense signal, but no dark signal on the ADC map, compatible with subacute infarcts. There are chronic right gangliocapsular infarcts. Scattered periventricular punctate T2 FLAIR hyperintensities consistent with microangiopathy.    Ventricles and sulci are normal in size and configuration. No extra axial collection. No mass effect or midline shift.  No evidence of hemorrhage on the gradient echo sequence. The major vessel flow voids are preserved at the skull base.  Cerebellar tonsils are in normal location.      No gross orbital mass. Mild mucosal thickening in bilateral ethmoid air cells and right frontal and left maxillary sinuses. Mastoid air cells are normal in signal. Bone marrow signal is unremarkable.    IMPRESSION: Bilateral focal/lacunar acute and subacute infarcts as detailed above compatible with cardioembolic infarcts. No hemorrhagic transformation.    Last TCD: NONE    Last EEG: NONE    9. Cardiovascular:   HR: 77 (04-14-20 @ 23:00) (70 - 82)  BP: 190/96 (04-14-20 @ 23:00) (163/90 - 194/93)    Last Echo: PENDING    Last EKG:  < from: 12 Lead ECG (04.13.20 @ 16:59) >  Ventricular Rate 83 BPM  Atrial Rate 83 BPM  P-R Interval 174 ms  QRS Duration 90 ms  Q-T Interval 382 ms  QTC Calculation(Bezet) 448 ms  P Axis 40 degrees  R Axis 79 degrees  T Axis 27 degrees  Diagnosis Line Normal sinus rhythm  Normal ECG    10. Respiratory:   RR: 18 (04-14-20 @ 23:00) (18 - 18)  SpO2: 98% (04-14-20 @ 23:00) (98% - 98%)  < from: Xray Chest 1 View AP/PA (04.13.20 @ 11:23) >  Impression:    No radiographic evidence of acute cardiopulmonary disease.    11.GI:  Prophalaxis: Protonix 40 mg daily  Bowel mvt:     Abd distension: No  LIVER FUNCTIONS - ( 13 Apr 2020 10:20 )  Alb: 3.9 g/dL / Pro: 6.7 g/dL / ALK PHOS: 80 U/L / ALT: 19 U/L / AST: 20 U/L / GGT: x           12.Renal/Fluids/Electrolytes:    143  |  112<H>  |  41<H>  ----------------------------<  125<H>  4.4   |  19  |  4.0<H>    Ca    7.8<L>      14 Apr 2020 16:00    PT/INR - ( 14 Apr 2020 12:35 )   PT: 11.30 sec;   INR: 0.98 ratio    PTT - ( 14 Apr 2020 12:35 )  PTT:28.0 sec    I&O's Summary    14 Apr 2020 07:01  -  15 Apr 2020 07:00  --------------------------------------------------------  IN: 200 mL / OUT: 0 mL / NET: 200 mL      13.ID:   T(C): 37.2 (04-14-20 @ 22:00), Max: 37.2 (04-14-20 @ 22:00)  T(F): 99 (04-14-20 @ 22:00), Max: 99 (04-14-20 @ 22:00)    Lines: Central[] Date inserted: Peripheral[X]    14. Hematology:                               15.7   9.18  )-----------( 239      ( 14 Apr 2020 06:00 )             45.9       PT/INR - ( 14 Apr 2020 12:35 )   PT: 11.30 sec;   INR: 0.98 ratio    PTT - ( 14 Apr 2020 12:35 )  PTT:28.0 sec    ENDO:  Hemoglobin A1C with Mean Plasma Glucose (04.13.20 @ 16:00)    Hemoglobin A1C, Whole Blood: 9.2: Method: Immunoassay    Lipid Profile in AM (04.14.20 @ 06:00)    Total Cholesterol/HDL Ratio Measurement: 7.6 Ratio    Cholesterol, Serum: 266 mg/dL    Triglycerides, Serum: 200 mg/dL    HDL Cholesterol, Serum: 35: HDL Levels >/= 60 mg/dL are considered beneficial and a "negative" risk  factor.  Effective 08/15/2018: New reference range and interpretive comment. mg/dL    Direct LDL: 199: LDL Cholesterol (mg/dL) --- Interpretive Comment (for adults 18 and over)      DVT Prophylaxis Lovenox[ ] Heparin[X ] Venodynes[ ] SCD's[ ] Neurocritical Care Progress Note:    1. Brief Presentation: He reports of improved left sided weakness, continues to have dysarthria and left facial. His BP still elevated, but titrating down gradually.     2. Today's Acute Problems: Left sided weakness, left facial droop, and + dysarthria    3. 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 Of note, he also reports of body aches "as if I had the flu," + chills, abdominal discomfort without N/V. He denies chest pain, cough, HA or dysuria. In ED, he was found to have /128, initial NIHSS 4. Initial CT head negative for intracranial bleed and no evidence of stroke. Patient was started on nicardipine drip but it was subsequently d/rodger per neurology request due to worsening left sided weakness and for sudden drop in SBP to ~170s. He was also found to have elevated creatinine at 4 (no baseline).     4-Yesterday's Plan:  - Telemetry monitoring  -Only restart Cardene if BP >220/110 and sustained.   -MRI brain without carmelina   -Echo with bubble study  -LDL  -Maintain strict glycemic control  -PT OT Rehab  - mg daily.   -Lipitor 80 mg QHS  -PT OT Rehab Speech eval  -Neuro checks q4h    5. Last 24 hour updates: COVID19 PCR ruled out and transferred to ICU service on . Brain MRI obtained and reports of acute lacunar infarct of right BG and CR and subacute infarcts in left CR. Neurologically stable.     6. Medications:   aspirin enteric coated 162 milliGRAM(s) Oral daily  atorvastatin 80 milliGRAM(s) Oral at bedtime  chlorhexidine 4% Liquid 1 Application(s) Topical <User Schedule>  clopidogrel Tablet 75 milliGRAM(s) Oral daily  dextrose 5%. 1000 milliLiter(s) (50 mL/Hr) IV Continuous <Continuous>  dextrose 50% Injectable 12.5 Gram(s) IV Push once  dextrose 50% Injectable 25 Gram(s) IV Push once  dextrose 50% Injectable 25 Gram(s) IV Push once  heparin  Injectable 5000 Unit(s) SubCutaneous every 8 hours  insulin glargine Injectable (LANTUS) 21 Unit(s) SubCutaneous at bedtime  insulin lispro (HumaLOG) corrective regimen sliding scale   SubCutaneous three times a day before meals  insulin lispro Injectable (HumaLOG) 7 Unit(s) SubCutaneous three times a day before meals  labetalol 200 milliGRAM(s) Oral every 12 hours  NIFEdipine XL 60 milliGRAM(s) Oral daily  pantoprazole    Tablet 40 milliGRAM(s) Oral before breakfast    7. Ancillary Management:   Chest PT[ ]   Head of bed >35 [ ]   Out of bed to chair [X ]   PT/OT/SP Eval [ X]   Spirometry[ ]   DVT prophalaxis[ ]    8.Neuro:   Mental status: Awake, alert and oriented x 4. Attention and concentration intact. Fund of knowledge appropriate.  Language: Following commands. Naming, repetition, fluency, and comprehension intact. + Dysarthria, no aphasia.  Cranial nerves: B/l pupils equally round and reactive to light to 4->3 mm, visual fields full, no nystagmus, extraocular muscles intact, V1 through V3 intact bilaterally and symmetric, left facial droop, hearing intact to finger rub, palate elevation symmetric, tongue was midline, sternocleidomastoid/shoulder shrug strength bilaterally 5/5.    Motor: Normal bulk and tone, strength 5/5 in bilateral upper and lower extremities.   strength 4/5 in left hand. No drift in all extremities.   Sensation: Intact to light touch, proprioception, and pinprick.  No neglect.   Coordination: No dysmetria on finger-to-nose and heel-to-shin.     NIH STROKE SCALE  Item	                                                        Score  1 a.	Level of Consciousness	               	0  1 b. LOC Questions	                                    0  1 c.	LOC Commands	                               	0  2.	Best Gaze	                                    0  3.	Visual	                                                0  4.	Facial Palsy	                                    2  5 a.	Motor Arm - Left	                        0  5 b.	Motor Arm - Right	                        0  6 a.	Motor Leg - Left	                        0  6 b.	Motor Leg - Right	                        0  7.	Limb Ataxia	                                    0  8.	Sensory	                                                0  9.	Language	                                    0  10.	Dysarthria	                                    1  11.	Extinction and Inattention  	            0  ________________________________________________________________________  TOTAL	                                                            4->3    mRS: baseline 0 ->1 No significant disability despite symptoms; able to carry out all usual duties and activities without assistance    Last CTH:  < from: CT Head No Cont (20 @ 13:14) >  IMPRESSION:   In comparison with the prior noncontrast CT scan of the brain dated 2020 time 10:27 AM:  Stable examination.  MRI of the brain is suggested for further evaluation not contraindicated in this patient.    < from: CT Brain Stroke Protocol (20 @ 10:35) >  IMPRESSION:  1.  No acute hemorrhage  2.  No acute territorial infarct  3.  Bilateral relatively symmetric diminished density within the white matter tracts of the corona radiata extending to external capsules. The appearance suggests ischemic change, but does not appear acute    Last CTA/CTP:  < from: CT Perfusion w/ Maps w/ IV Cont (20 @ 11:00) >  FINDINGS:  Aortic arch: There is a normal branching pattern. The origins of the great vessels are normal. The subclavian arteries are unremarkable.  Right carotid: The common carotid artery is normal. The bifurcation is normally patent. The cervical internal and external carotid arteries are normal.  Left carotid: The common carotid artery is unremarkable. The bifurcation is patent. The cervical internal and external carotid arteries are normal.    Anterior intracranial circulation:    Right ICA: The petrous, cavernous and supraclinoid portions of the ICA are normal. The ophthalmic artery is patent. There is a prominent posterior communicating supply to the posterior cerebral artery with hypoplastic P1. The M1, bifurcation and remaining branches of the middle cerebral artery are patent. The anterior cerebral arteryA1 segment is mildly hypoplastic.    Left ICA: The petrous, cavernous and supraclinoid portions of the ICA are patent. There may be mild narrowing of the para ophthalmic ICA. The ophthalmic artery and small posterior communicating arteries are patent. The middle cerebral artery, trifurcation and branches are patent. The anterior cerebral arteries patent.    The region of the anterior to indicating arteries normal.    Vertebral basilar vessels: The vertebral arteries are patent in the neck. The left vertebral artery is dominant. The right vertebral artery is small and terminates in a posterior inferior cerebellar artery. The left PICA is patent. The basilar artery, superior cerebellar and posterior cerebral arteries are patent.    The visualized brain parenchyma was evaluated on the routine head CT performed at same time.    Other findings: The submandibular and parotid glands are unremarkable. The thyroid gland is unremarkable.    The visualized upper aerodigestive tract is normal.    Therapid perfusion maps do not demonstrate evidence for brain ischemia using the Tmax > 6 sec and CBF < 30% mismatch volume criteria.    Images to the upper thorax demonstrates that the visualized pleural parenchyma and mediastinum are normal.    IMPRESSION:  1.  Patent carotid bifurcations.  2.  No large vessel occlusion  3.  No evidence for ischemia using rapid perfusion maps and the Tmax > 6 sec and CBF < 30% mismatch volume criteria.    Last MRI:   < from: MR Head No Cont (04.15.20 @ 09:36) >  FINDINGS:   There is restricted diffusion with corresponding T2/FLAIR hyperintense signal in the right basal ganglia extending to the right frontal corona radiata compatible with infarct.There are also additional foci of restricted diffusion in the right external capsule, in the right periatrial white matter, compatible with lacunar infarcts. There are foci of high signal in the left corona radiata on the B 1000 sequence with corresponding T2 and FLAIR hyperintense signal, but no dark signal on the ADC map, compatible with subacute infarcts. There are chronic right gangliocapsular infarcts. Scattered periventricular punctate T2 FLAIR hyperintensities consistent with microangiopathy.    Ventricles and sulci are normal in size and configuration. No extra axial collection. No mass effect or midline shift.  No evidence of hemorrhage on the gradient echo sequence. The major vessel flow voids are preserved at the skull base.  Cerebellar tonsils are in normal location.      No gross orbital mass. Mild mucosal thickening in bilateral ethmoid air cells and right frontal and left maxillary sinuses. Mastoid air cells are normal in signal. Bone marrow signal is unremarkable.    IMPRESSION: Bilateral focal/lacunar acute and subacute infarcts as detailed above compatible with cardioembolic infarcts. No hemorrhagic transformation.    Last TCD: NONE    Last EEG: NONE    9. Cardiovascular:   HR: 77 (20 @ 23:00) (70 - 82)  BP: 190/96 (20 @ 23:00) (163/90 - 194/93)    Last Echo:   < from: Transthoracic Echocardiogram (04.15.20 @ 09:54) >  EXAM:  2-D ECHO (TTE) COMPLETE    PROCEDURE DATE:  04/15/2020      INTERPRETATION:  REPORT:    TRANSTHORACIC ECHOCARDIOGRAM REPORT      Patient Name:   TARUN DEL TORO Accession #: [id number]  Medical Rec #:  [id number]       Height:      62.0 in 157.5 cm  YOB: 1970      Weight:      68.0 lb 30.84 kg  Patient Age:    49 years       BSA:         1.21 m²  Patient Gender: M              BP:          190/98 mmHg       Date of Exam:        4/15/2020 9:54:22 AM  Referring Physician: [id number] ED UNASSIGNED  Sonographer:         Adela Coe  Reading Physician:   Gus Butler M.D.    Procedure:   2D Echo/Doppler/Color Doppler Complete and Saline Contrast (Bubble               Study).  Indications: I69.998 - Other Sequelae following unspecified Cerebrovascular D  Diagnosis:   Other forms of dyspnea - R06.09    Summary:   1. LV Ejection Fraction by Ortiz's Method with a biplane EF of 57 %.   2. Moderately increased LV wall thickness.   3. Spectral Doppler shows impaired relaxation pattern of left ventricular myocardial filling (Grade I diastolic dysfunction).   4. Mild mitral valve regurgitation.   5. Color flow doppler and intravenous injection of agitated saline demonstrates the presence of an intact intra atrial septum.    PHYSICIAN INTERPRETATION:  Left Ventricle: The left ventricular internal cavity size is normal. Left ventricular wall thickness is moderately increased. Spectral Doppler shows impaired relaxation pattern of left ventricular myocardial filling (Grade I diastolic dysfunction).  Right Ventricle: Normal right ventricular size and function.  Left Atrium: Mildly enlarged left atrium. Color flow doppler and intravenous injection of agitated saline demonstrates the presence of an intact intra atrial septum.  Right Atrium: Normal right atrial size.  Pericardium: There is no evidence of pericardial effusion.  Mitral Valve: No evidence of mitral stenosis. Mild mitral valve regurgitation is seen.  Tricuspid Valve: Mild tricuspid regurgitation isvisualized.  Aortic Valve: No evidence of aortic stenosis. Aortic valve thickening with normal leaflet opening. Trivial aortic valve regurgitation is seen.  Pulmonic Valve: Mild pulmonic valve regurgitation.  Aorta: The aortic root is normal in size and structure.  Venous: The inferior vena cava was normal sized, with respiratory size variation greater than 50%.  Shunts: Agitated saline contrast was given intravenously to evaluate for intracardiac shunting.       2D AND M-MODE MEASUREMENTS (normal ranges within parentheses):  Left                  Normal   Aorta/Left             Normal  Ventricle:                     Atrium:  IVSd (2D):  1.43 cm  (0.7-1.1) AoV Cusp       1.87  (1.5-2.6)  LVPWd (2D): 1.38 cm  (0.7-1.1) Separation:     cm  LVIDd (2D): 4.12 cm  (3.4-5.7) Left Atrium    3.86  (1.9-4.0)  LVIDs (2D): 2.77 cm            (Mmode):        cm  LV FS (2D):  32.8 %   (>25%)   LA Volume      34.3  IVSd        1.48 cm  (0.7-1.1) Index         ml/m²  (Mmode):                       Right  LVPWd       1.08 cm  (0.7-1.1) Ventricle:  (Mmode):                       RVd (Mmode):   1.44 cm  LVIDd       4.48 cm  (3.4-5.7) RVd (2D):      2.31 cm  (Mmode):  LVIDs       3.20 cm  (Mmode):  LV FS        28.6 %   (>25%)  (Mmode):  Relative      0.67    (<0.42)  Wall  Thickness  Rel. Wall     0.48    (<0.42)  Thickness  Mm  LV Mass      179.4  Index:        g/m²  Mmode    SPECTRAL DOPPLER ANALYSIS:  LV DIASTOLIC FUNCTION:  MV Peak E: 0.51 m/s Decel Time: 247 msec  MV Peak A: 0.79 m/s  E/A Ratio: 0.64    LVOT Vmax: 0.65 m/s  LVOT VTI:  0.15 m  LVOT Diam: 2.27 cm    Mitral Valve:  MV P1/2 Time: 71.55 msec  MV Area, PHT: 3.07 cm²    Tricuspid Valve and PA/RV Systolic Pressure: TR Max Velocity: 0.92 m/s RA Pressure: 3 mmHg RVSP/PASP: 6.4 mmHg    Pulmonic Valve:  PV Max Velocity: 1.06 m/s PV Max P.5 mmHg PV Mean PG:    Last EKG:  < from: 12 Lead ECG (20 @ 16:59) >  Ventricular Rate 83 BPM  Atrial Rate 83 BPM  P-R Interval 174 ms  QRS Duration 90 ms  Q-T Interval 382 ms  QTC Calculation(Bezet) 448 ms  P Axis 40 degrees  R Axis 79 degrees  T Axis 27 degrees  Diagnosis Line Normal sinus rhythm  Normal ECG    10. Respiratory:   RR: 18 (20 @ 23:00) (18 - 18)  SpO2: 98% (20 @ 23:00) (98% - 98%)  < from: Xray Chest 1 View AP/PA (20 @ 11:23) >  Impression:    No radiographic evidence of acute cardiopulmonary disease.    11.GI:  Prophalaxis: Protonix 40 mg daily  Bowel mvt:     Abd distension: No  LIVER FUNCTIONS - ( 2020 10:20 )  Alb: 3.9 g/dL / Pro: 6.7 g/dL / ALK PHOS: 80 U/L / ALT: 19 U/L / AST: 20 U/L / GGT: x           12.Renal/Fluids/Electrolytes:    143  |  112<H>  |  41<H>  ----------------------------<  125<H>  4.4   |  19  |  4.0<H>    Ca    7.8<L>      2020 16:00    PT/INR - ( 2020 12:35 )   PT: 11.30 sec;   INR: 0.98 ratio    PTT - ( 2020 12:35 )  PTT:28.0 sec    I&O's Summary    2020 07:01  -  15 Apr 2020 07:00  --------------------------------------------------------  IN: 200 mL / OUT: 0 mL / NET: 200 mL      13.ID:   T(C): 37.2 (20 @ 22:00), Max: 37.2 (20 @ 22:00)  T(F): 99 (20 @ 22:00), Max: 99 (20 @ 22:00)    Lines: Central[] Date inserted: Peripheral[X]    14. Hematology:                               15.7   9.18  )-----------( 239      ( 2020 06:00 )             45.9       PT/INR - ( 2020 12:35 )   PT: 11.30 sec;   INR: 0.98 ratio    PTT - ( 2020 12:35 )  PTT:28.0 sec    ENDO:  Hemoglobin A1C with Mean Plasma Glucose (20 @ 16:00)    Hemoglobin A1C, Whole Blood: 9.2: Method: Immunoassay    Lipid Profile in AM (20 @ 06:00)    Total Cholesterol/HDL Ratio Measurement: 7.6 Ratio    Cholesterol, Serum: 266 mg/dL    Triglycerides, Serum: 200 mg/dL    HDL Cholesterol, Serum: 35: HDL Levels >/= 60 mg/dL are considered beneficial and a "negative" risk  factor.  Effective 08/15/2018: New reference range and interpretive comment. mg/dL    Direct LDL: 199: LDL Cholesterol (mg/dL) --- Interpretive Comment (for adults 18 and over)      DVT Prophylaxis Lovenox[ ] Heparin[X ] Venodynes[ ] SCD's[ ] Detail Level: Detailed Quality 47: Advance Care Plan: Advance Care Planning discussed and documented; advance care plan or surrogate decision maker documented in the medical record. Quality 226: Preventive Care And Screening: Tobacco Use: Screening And Cessation Intervention: Patient screened for tobacco use and is an ex/non-smoker Quality 508: Adult Covid-19 Vaccination Status: Patients who are up to date on their COVID-19 vaccinations as defined by CDC recommendations on current vaccination

## 2025-07-25 NOTE — PROGRESS NOTE ADULT - REASON FOR ADMISSION
Requested Prescriptions     Pending Prescriptions Disp Refills    FLUoxetine 20 MG Oral Cap  0     Sig: Take 2 capsules (40 mg total) by mouth every morning.    ALPRAZolam 0.25 MG Oral Tab  0     Sig: Take 1 tablet (0.25 mg total) by mouth daily as needed.     Medications not prescribed in the past by Dr. Soto.  Patient reported medications.  Refills denied.   
Slurred speech and left facial droop